# Patient Record
Sex: MALE | Race: BLACK OR AFRICAN AMERICAN | NOT HISPANIC OR LATINO | URBAN - METROPOLITAN AREA
[De-identification: names, ages, dates, MRNs, and addresses within clinical notes are randomized per-mention and may not be internally consistent; named-entity substitution may affect disease eponyms.]

---

## 2018-04-01 ENCOUNTER — OUTPATIENT (OUTPATIENT)
Dept: OUTPATIENT SERVICES | Facility: HOSPITAL | Age: 63
LOS: 1 days | End: 2018-04-01
Payer: MEDICAID

## 2018-04-01 PROCEDURE — G9001: CPT

## 2018-04-03 ENCOUNTER — EMERGENCY (EMERGENCY)
Facility: HOSPITAL | Age: 63
LOS: 1 days | End: 2018-04-03
Attending: EMERGENCY MEDICINE
Payer: MEDICAID

## 2018-04-03 ENCOUNTER — EMERGENCY (EMERGENCY)
Facility: HOSPITAL | Age: 63
LOS: 0 days | Discharge: ANOTHER HOSPITAL | End: 2018-04-03
Attending: STUDENT IN AN ORGANIZED HEALTH CARE EDUCATION/TRAINING PROGRAM
Payer: MEDICAID

## 2018-04-03 VITALS
OXYGEN SATURATION: 97 % | HEART RATE: 70 BPM | RESPIRATION RATE: 18 BRPM | DIASTOLIC BLOOD PRESSURE: 92 MMHG | SYSTOLIC BLOOD PRESSURE: 163 MMHG

## 2018-04-03 VITALS
OXYGEN SATURATION: 97 % | TEMPERATURE: 98 F | HEIGHT: 77 IN | HEART RATE: 92 BPM | RESPIRATION RATE: 17 BRPM | SYSTOLIC BLOOD PRESSURE: 153 MMHG | WEIGHT: 220.02 LBS | DIASTOLIC BLOOD PRESSURE: 96 MMHG

## 2018-04-03 VITALS
SYSTOLIC BLOOD PRESSURE: 158 MMHG | RESPIRATION RATE: 17 BRPM | DIASTOLIC BLOOD PRESSURE: 99 MMHG | HEART RATE: 84 BPM | OXYGEN SATURATION: 100 %

## 2018-04-03 VITALS
DIASTOLIC BLOOD PRESSURE: 94 MMHG | HEART RATE: 72 BPM | SYSTOLIC BLOOD PRESSURE: 154 MMHG | TEMPERATURE: 98 F | OXYGEN SATURATION: 98 % | RESPIRATION RATE: 16 BRPM

## 2018-04-03 DIAGNOSIS — S02.92XA UNSPECIFIED FRACTURE OF FACIAL BONES, INITIAL ENCOUNTER FOR CLOSED FRACTURE: ICD-10-CM
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
## 2018-04-03 DIAGNOSIS — S22.41XA MULTIPLE FRACTURES OF RIBS, RIGHT SIDE, INITIAL ENCOUNTER FOR CLOSED FRACTURE: ICD-10-CM

## 2018-04-03 DIAGNOSIS — M54.9 DORSALGIA, UNSPECIFIED: ICD-10-CM

## 2018-04-03 LAB
ALBUMIN SERPL ELPH-MCNC: 3.8 G/DL — SIGNIFICANT CHANGE UP (ref 3.3–5)
ALP SERPL-CCNC: 87 U/L — SIGNIFICANT CHANGE UP (ref 40–120)
ALT FLD-CCNC: 42 U/L — SIGNIFICANT CHANGE UP (ref 12–78)
ANION GAP SERPL CALC-SCNC: 7 MMOL/L — SIGNIFICANT CHANGE UP (ref 5–17)
APTT BLD: 31.4 SEC — SIGNIFICANT CHANGE UP (ref 27.5–37.4)
AST SERPL-CCNC: 48 U/L — HIGH (ref 15–37)
BASOPHILS # BLD AUTO: 0.02 K/UL — SIGNIFICANT CHANGE UP (ref 0–0.2)
BASOPHILS NFR BLD AUTO: 0.3 % — SIGNIFICANT CHANGE UP (ref 0–2)
BILIRUB SERPL-MCNC: 0.6 MG/DL — SIGNIFICANT CHANGE UP (ref 0.2–1.2)
BUN SERPL-MCNC: 14 MG/DL — SIGNIFICANT CHANGE UP (ref 7–23)
CALCIUM SERPL-MCNC: 8.9 MG/DL — SIGNIFICANT CHANGE UP (ref 8.5–10.1)
CHLORIDE SERPL-SCNC: 106 MMOL/L — SIGNIFICANT CHANGE UP (ref 96–108)
CO2 SERPL-SCNC: 25 MMOL/L — SIGNIFICANT CHANGE UP (ref 22–31)
CREAT SERPL-MCNC: 0.94 MG/DL — SIGNIFICANT CHANGE UP (ref 0.5–1.3)
EOSINOPHIL # BLD AUTO: 0.02 K/UL — SIGNIFICANT CHANGE UP (ref 0–0.5)
EOSINOPHIL NFR BLD AUTO: 0.3 % — SIGNIFICANT CHANGE UP (ref 0–6)
GLUCOSE SERPL-MCNC: 108 MG/DL — HIGH (ref 70–99)
HCT VFR BLD CALC: 43.7 % — SIGNIFICANT CHANGE UP (ref 39–50)
HGB BLD-MCNC: 14.7 G/DL — SIGNIFICANT CHANGE UP (ref 13–17)
IMM GRANULOCYTES NFR BLD AUTO: 0.3 % — SIGNIFICANT CHANGE UP (ref 0–1.5)
INR BLD: 1.08 RATIO — SIGNIFICANT CHANGE UP (ref 0.88–1.16)
LYMPHOCYTES # BLD AUTO: 1 K/UL — SIGNIFICANT CHANGE UP (ref 1–3.3)
LYMPHOCYTES # BLD AUTO: 16.9 % — SIGNIFICANT CHANGE UP (ref 13–44)
MCHC RBC-ENTMCNC: 29.5 PG — SIGNIFICANT CHANGE UP (ref 27–34)
MCHC RBC-ENTMCNC: 33.6 GM/DL — SIGNIFICANT CHANGE UP (ref 32–36)
MCV RBC AUTO: 87.6 FL — SIGNIFICANT CHANGE UP (ref 80–100)
MONOCYTES # BLD AUTO: 0.42 K/UL — SIGNIFICANT CHANGE UP (ref 0–0.9)
MONOCYTES NFR BLD AUTO: 7.1 % — SIGNIFICANT CHANGE UP (ref 2–14)
NEUTROPHILS # BLD AUTO: 4.45 K/UL — SIGNIFICANT CHANGE UP (ref 1.8–7.4)
NEUTROPHILS NFR BLD AUTO: 75.1 % — SIGNIFICANT CHANGE UP (ref 43–77)
NRBC # BLD: 0 /100 WBCS — SIGNIFICANT CHANGE UP (ref 0–0)
PLATELET # BLD AUTO: 244 K/UL — SIGNIFICANT CHANGE UP (ref 150–400)
POTASSIUM SERPL-MCNC: 4.2 MMOL/L — SIGNIFICANT CHANGE UP (ref 3.5–5.3)
POTASSIUM SERPL-SCNC: 4.2 MMOL/L — SIGNIFICANT CHANGE UP (ref 3.5–5.3)
PROT SERPL-MCNC: 7.7 GM/DL — SIGNIFICANT CHANGE UP (ref 6–8.3)
PROTHROM AB SERPL-ACNC: 11.8 SEC — SIGNIFICANT CHANGE UP (ref 9.8–12.7)
RBC # BLD: 4.99 M/UL — SIGNIFICANT CHANGE UP (ref 4.2–5.8)
RBC # FLD: 13.6 % — SIGNIFICANT CHANGE UP (ref 10.3–14.5)
SODIUM SERPL-SCNC: 138 MMOL/L — SIGNIFICANT CHANGE UP (ref 135–145)
WBC # BLD: 5.93 K/UL — SIGNIFICANT CHANGE UP (ref 3.8–10.5)
WBC # FLD AUTO: 5.93 K/UL — SIGNIFICANT CHANGE UP (ref 3.8–10.5)

## 2018-04-03 PROCEDURE — 99284 EMERGENCY DEPT VISIT MOD MDM: CPT

## 2018-04-03 PROCEDURE — 72125 CT NECK SPINE W/O DYE: CPT | Mod: 26

## 2018-04-03 PROCEDURE — 96374 THER/PROPH/DIAG INJ IV PUSH: CPT

## 2018-04-03 PROCEDURE — 70486 CT MAXILLOFACIAL W/O DYE: CPT | Mod: 26

## 2018-04-03 PROCEDURE — 71045 X-RAY EXAM CHEST 1 VIEW: CPT | Mod: 26

## 2018-04-03 PROCEDURE — 74177 CT ABD & PELVIS W/CONTRAST: CPT | Mod: 26

## 2018-04-03 PROCEDURE — 71250 CT THORAX DX C-: CPT | Mod: 26

## 2018-04-03 PROCEDURE — 76377 3D RENDER W/INTRP POSTPROCES: CPT | Mod: 26

## 2018-04-03 PROCEDURE — 99284 EMERGENCY DEPT VISIT MOD MDM: CPT | Mod: 25

## 2018-04-03 PROCEDURE — 96375 TX/PRO/DX INJ NEW DRUG ADDON: CPT

## 2018-04-03 PROCEDURE — 99283 EMERGENCY DEPT VISIT LOW MDM: CPT

## 2018-04-03 PROCEDURE — 99285 EMERGENCY DEPT VISIT HI MDM: CPT | Mod: 25

## 2018-04-03 PROCEDURE — 70450 CT HEAD/BRAIN W/O DYE: CPT | Mod: 26

## 2018-04-03 PROCEDURE — 71045 X-RAY EXAM CHEST 1 VIEW: CPT

## 2018-04-03 RX ORDER — OXYCODONE HYDROCHLORIDE 5 MG/1
1 TABLET ORAL
Qty: 12 | Refills: 0
Start: 2018-04-03 | End: 2018-04-06

## 2018-04-03 RX ORDER — MORPHINE SULFATE 50 MG/1
4 CAPSULE, EXTENDED RELEASE ORAL ONCE
Qty: 0 | Refills: 0 | Status: DISCONTINUED | OUTPATIENT
Start: 2018-04-03 | End: 2018-04-03

## 2018-04-03 RX ORDER — ONDANSETRON 8 MG/1
4 TABLET, FILM COATED ORAL ONCE
Qty: 0 | Refills: 0 | Status: COMPLETED | OUTPATIENT
Start: 2018-04-03 | End: 2018-04-03

## 2018-04-03 RX ADMIN — MORPHINE SULFATE 4 MILLIGRAM(S): 50 CAPSULE, EXTENDED RELEASE ORAL at 13:58

## 2018-04-03 RX ADMIN — MORPHINE SULFATE 4 MILLIGRAM(S): 50 CAPSULE, EXTENDED RELEASE ORAL at 18:38

## 2018-04-03 RX ADMIN — ONDANSETRON 4 MILLIGRAM(S): 8 TABLET, FILM COATED ORAL at 18:21

## 2018-04-03 RX ADMIN — MORPHINE SULFATE 4 MILLIGRAM(S): 50 CAPSULE, EXTENDED RELEASE ORAL at 18:16

## 2018-04-03 RX ADMIN — ONDANSETRON 4 MILLIGRAM(S): 8 TABLET, FILM COATED ORAL at 13:58
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
## 2018-04-03 NOTE — ED PROVIDER NOTE - CARE PLAN
Principal Discharge DX:	Rib fractures  Assessment and plan of treatment:	1. Rest, ice, elevate area.  Can use 1000mg Tylenol every 6 hours for pain - as needed.  Take Motrin 600mg every 8 hrs with food for pain. Use spirometry 10-15 times per hour.   2. Follow up with PMD within 48-72 hrs.    3. Any worsening pain, swelling, weakness, numbness, shortness of breath return to ED. Ortho referral list provided if needed.  Secondary Diagnosis:	Facial bone fracture Principal Discharge DX:	Rib fractures  Assessment and plan of treatment:	1. Rest, ice, elevate area.  Can use 1000mg Tylenol every 6 hours for pain - as needed.  Take Motrin 600mg every 8 hrs with food for pain. Use spirometry 10-15 times per hour.  Take oxycodone as needed, as per package directions, for breakthrough pain.  DO NOT DRINK OR OPERATE HEAVY MACHINERY UNDER THE INFLUENCE OF OXYCODONE.  This medication can cause constipation; take over-the-counter stool softeners as per package directions for symptomatic relief.   2. Follow up with PMD within 48-72 hrs.    3. Any worsening pain, swelling, weakness, numbness, shortness of breath return to ED. Ortho referral list provided if needed.  Secondary Diagnosis:	Facial bone fracture Principal Discharge DX:	Rib fractures  Assessment and plan of treatment:	1. Rest, ice packs or cold compresses to the affected area 20 minutes on and 20 minutes off.  Can use 1000mg Tylenol every 6 hours for pain - as needed.  Use spirometry 10-15 times per hour.  Take oxycodone as needed, as per package directions, for breakthrough pain.  DO NOT DRINK OR OPERATE HEAVY MACHINERY UNDER THE INFLUENCE OF OXYCODONE.  This medication can cause constipation; take over-the-counter stool softeners as per package directions for symptomatic relief.   2. Follow up with PMD within 48-72 hrs.  Follow up with Dr. Jeff Goldberg 4/6/18.   3. Any worsening pain, swelling, weakness, numbness, shortness of breath return to ED.  Secondary Diagnosis:	Facial bone fracture

## 2018-04-03 NOTE — ED PROVIDER NOTE - CARE PLAN
Principal Discharge DX:	Closed fracture of multiple ribs of right side, initial encounter  Secondary Diagnosis:	Facial fracture
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
## 2018-04-03 NOTE — ED PROVIDER NOTE - PLAN OF CARE
1. Rest, ice, elevate area.  Can use 1000mg Tylenol every 6 hours for pain - as needed.  Take Motrin 600mg every 8 hrs with food for pain. Use spirometry 10-15 times per hour.   2. Follow up with PMD within 48-72 hrs.    3. Any worsening pain, swelling, weakness, numbness, shortness of breath return to ED. Ortho referral list provided if needed. 1. Rest, ice, elevate area.  Can use 1000mg Tylenol every 6 hours for pain - as needed.  Take Motrin 600mg every 8 hrs with food for pain. Use spirometry 10-15 times per hour.  Take oxycodone as needed, as per package directions, for breakthrough pain.  DO NOT DRINK OR OPERATE HEAVY MACHINERY UNDER THE INFLUENCE OF OXYCODONE.  This medication can cause constipation; take over-the-counter stool softeners as per package directions for symptomatic relief.   2. Follow up with PMD within 48-72 hrs.    3. Any worsening pain, swelling, weakness, numbness, shortness of breath return to ED. Ortho referral list provided if needed. 1. Rest, ice packs or cold compresses to the affected area 20 minutes on and 20 minutes off.  Can use 1000mg Tylenol every 6 hours for pain - as needed.  Use spirometry 10-15 times per hour.  Take oxycodone as needed, as per package directions, for breakthrough pain.  DO NOT DRINK OR OPERATE HEAVY MACHINERY UNDER THE INFLUENCE OF OXYCODONE.  This medication can cause constipation; take over-the-counter stool softeners as per package directions for symptomatic relief.   2. Follow up with PMD within 48-72 hrs.  Follow up with Dr. Jeff Goldberg 4/6/18.   3. Any worsening pain, swelling, weakness, numbness, shortness of breath return to ED.
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
## 2018-04-03 NOTE — ED ADULT NURSE NOTE - ED STAT RN HANDOFF DETAILS
Nestor OCAMPO transfer nurse phone report given to be transfer to the ed for evaluation.. [facility]
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
## 2018-04-03 NOTE — ED PROVIDER NOTE - PROGRESS NOTE DETAILS
transfer center called, spoke to Dr Rahman (trauma attending) who accepted the patient, report given to Dr Morales in the ER
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
## 2018-04-04 DIAGNOSIS — R69 ILLNESS, UNSPECIFIED: ICD-10-CM

## 2019-05-07 ENCOUNTER — EMERGENCY (EMERGENCY)
Facility: HOSPITAL | Age: 64
LOS: 0 days | Discharge: HOME | End: 2019-05-07
Payer: MEDICAID

## 2019-05-07 VITALS
HEART RATE: 72 BPM | OXYGEN SATURATION: 98 % | SYSTOLIC BLOOD PRESSURE: 187 MMHG | RESPIRATION RATE: 18 BRPM | TEMPERATURE: 98 F | DIASTOLIC BLOOD PRESSURE: 90 MMHG | WEIGHT: 225.09 LBS | HEIGHT: 77 IN

## 2019-05-07 DIAGNOSIS — M79.644 PAIN IN RIGHT FINGER(S): ICD-10-CM

## 2019-05-07 DIAGNOSIS — M19.90 UNSPECIFIED OSTEOARTHRITIS, UNSPECIFIED SITE: ICD-10-CM

## 2019-05-07 PROCEDURE — 99283 EMERGENCY DEPT VISIT LOW MDM: CPT

## 2019-05-07 PROCEDURE — 73130 X-RAY EXAM OF HAND: CPT | Mod: 26,LT

## 2019-05-07 NOTE — ED PROVIDER NOTE - CARE PROVIDER_API CALL
Kristyn Rubio (MD)  Plastic Surgery  [address] [place] [address], [address]  [place], [place] [id number]  Phone: [phone]  Fax: [phone]  Follow Up Time:

## 2019-05-07 NOTE — ED PROVIDER NOTE - MUSCULOSKELETAL MINIMAL EXAM
Right pinky pip, mild swelling, no erythema, no warmth, +arom, good pusle and sensory/motor intact/atraumatic

## 2019-05-07 NOTE — ED PROVIDER NOTE - OBJECTIVE STATEMENT
This is 64 yo m w a pmhx of arthritis c/o of right pinky pain and swelling x several years. Denies nausea, vomiting, fever, sob, chest pain

## 2019-05-09 ENCOUNTER — EMERGENCY (EMERGENCY)
Facility: HOSPITAL | Age: 64
LOS: 1 days | Discharge: HOME | End: 2019-05-09
Attending: EMERGENCY MEDICINE | Admitting: EMERGENCY MEDICINE
Payer: MEDICAID

## 2019-05-09 VITALS
RESPIRATION RATE: 16 BRPM | TEMPERATURE: 99 F | OXYGEN SATURATION: 99 % | DIASTOLIC BLOOD PRESSURE: 106 MMHG | SYSTOLIC BLOOD PRESSURE: 175 MMHG | HEART RATE: 73 BPM

## 2019-05-09 PROCEDURE — 73130 X-RAY EXAM OF HAND: CPT | Mod: 26,LT

## 2019-05-09 PROCEDURE — 99283 EMERGENCY DEPT VISIT LOW MDM: CPT

## 2019-05-09 RX ORDER — TETANUS TOXOID, REDUCED DIPHTHERIA TOXOID AND ACELLULAR PERTUSSIS VACCINE, ADSORBED 5; 2.5; 8; 8; 2.5 [IU]/.5ML; [IU]/.5ML; UG/.5ML; UG/.5ML; UG/.5ML
0.5 SUSPENSION INTRAMUSCULAR ONCE
Refills: 0 | Status: COMPLETED | OUTPATIENT
Start: 2019-05-09 | End: 2019-05-09

## 2019-05-09 RX ADMIN — Medication 300 MILLIGRAM(S): at 17:12

## 2019-05-09 RX ADMIN — TETANUS TOXOID, REDUCED DIPHTHERIA TOXOID AND ACELLULAR PERTUSSIS VACCINE, ADSORBED 0.5 MILLILITER(S): 5; 2.5; 8; 8; 2.5 SUSPENSION INTRAMUSCULAR at 17:12
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
## 2019-05-09 NOTE — ED PROVIDER NOTE - PHYSICAL EXAMINATION
Gen: Well appearing, NAD  Head: NCAT  HEENT: PERRL, MMM, normal conjunctiva, anicteric, neck supple  Lung: CTAB, no adventitious sounds  CV: RRR, no murmurs  Abd: soft, NTND, no rebound or guarding, no CVAT  MSK: No edema, swan-neck deformity of 3rd digit of L hand  Neuro: 5/5 strength and normal sensation in all digits.  Skin: open 2cm wounds of L pinky, mildly ttp, able to range all joints with mild limitation. No flexor tendon ttp, minimal pain with passive extension of finger, no overlying erythema, minimal swelling of finger  Psych: normal mood and affect

## 2020-05-17 ENCOUNTER — EMERGENCY (EMERGENCY)
Facility: HOSPITAL | Age: 65
LOS: 0 days | Discharge: HOME | End: 2020-05-17
Attending: EMERGENCY MEDICINE
Payer: MEDICAID

## 2020-05-17 VITALS
HEART RATE: 66 BPM | DIASTOLIC BLOOD PRESSURE: 85 MMHG | OXYGEN SATURATION: 98 % | RESPIRATION RATE: 16 BRPM | TEMPERATURE: 98 F | SYSTOLIC BLOOD PRESSURE: 155 MMHG

## 2020-05-17 VITALS
DIASTOLIC BLOOD PRESSURE: 97 MMHG | SYSTOLIC BLOOD PRESSURE: 186 MMHG | HEIGHT: 77 IN | HEART RATE: 77 BPM | RESPIRATION RATE: 20 BRPM | TEMPERATURE: 98 F | WEIGHT: 220.02 LBS | OXYGEN SATURATION: 98 %

## 2020-05-17 DIAGNOSIS — I10 ESSENTIAL (PRIMARY) HYPERTENSION: ICD-10-CM

## 2020-05-17 DIAGNOSIS — R10.9 UNSPECIFIED ABDOMINAL PAIN: ICD-10-CM

## 2020-05-17 DIAGNOSIS — M54.5 LOW BACK PAIN: ICD-10-CM

## 2020-05-17 LAB
ALBUMIN SERPL ELPH-MCNC: 3.2 G/DL — LOW (ref 3.3–5)
ALP SERPL-CCNC: 76 U/L — SIGNIFICANT CHANGE UP (ref 40–120)
ALT FLD-CCNC: 32 U/L — SIGNIFICANT CHANGE UP (ref 12–78)
ANION GAP SERPL CALC-SCNC: 5 MMOL/L — SIGNIFICANT CHANGE UP (ref 5–17)
APPEARANCE UR: ABNORMAL
AST SERPL-CCNC: 23 U/L — SIGNIFICANT CHANGE UP (ref 15–37)
BACTERIA # UR AUTO: ABNORMAL
BASOPHILS # BLD AUTO: 0 K/UL — SIGNIFICANT CHANGE UP (ref 0–0.2)
BASOPHILS NFR BLD AUTO: 0 % — SIGNIFICANT CHANGE UP (ref 0–2)
BILIRUB SERPL-MCNC: 0.5 MG/DL — SIGNIFICANT CHANGE UP (ref 0.2–1.2)
BILIRUB UR-MCNC: NEGATIVE — SIGNIFICANT CHANGE UP
BUN SERPL-MCNC: 14 MG/DL — SIGNIFICANT CHANGE UP (ref 7–23)
CALCIUM SERPL-MCNC: 8.4 MG/DL — LOW (ref 8.5–10.1)
CHLORIDE SERPL-SCNC: 108 MMOL/L — SIGNIFICANT CHANGE UP (ref 96–108)
CO2 SERPL-SCNC: 27 MMOL/L — SIGNIFICANT CHANGE UP (ref 22–31)
COLOR SPEC: YELLOW — SIGNIFICANT CHANGE UP
CREAT SERPL-MCNC: 0.99 MG/DL — SIGNIFICANT CHANGE UP (ref 0.5–1.3)
DIFF PNL FLD: NEGATIVE — SIGNIFICANT CHANGE UP
EOSINOPHIL # BLD AUTO: 0.44 K/UL — SIGNIFICANT CHANGE UP (ref 0–0.5)
EOSINOPHIL NFR BLD AUTO: 13 % — HIGH (ref 0–6)
EPI CELLS # UR: SIGNIFICANT CHANGE UP
GLUCOSE SERPL-MCNC: 107 MG/DL — HIGH (ref 70–99)
GLUCOSE UR QL: NEGATIVE MG/DL — SIGNIFICANT CHANGE UP
HCT VFR BLD CALC: 44 % — SIGNIFICANT CHANGE UP (ref 39–50)
HGB BLD-MCNC: 14.7 G/DL — SIGNIFICANT CHANGE UP (ref 13–17)
KETONES UR-MCNC: NEGATIVE — SIGNIFICANT CHANGE UP
LEUKOCYTE ESTERASE UR-ACNC: ABNORMAL
LIDOCAIN IGE QN: 92 U/L — SIGNIFICANT CHANGE UP (ref 73–393)
LYMPHOCYTES # BLD AUTO: 0.84 K/UL — LOW (ref 1–3.3)
LYMPHOCYTES # BLD AUTO: 25 % — SIGNIFICANT CHANGE UP (ref 13–44)
MANUAL SMEAR VERIFICATION: SIGNIFICANT CHANGE UP
MCHC RBC-ENTMCNC: 30 PG — SIGNIFICANT CHANGE UP (ref 27–34)
MCHC RBC-ENTMCNC: 33.4 GM/DL — SIGNIFICANT CHANGE UP (ref 32–36)
MCV RBC AUTO: 89.8 FL — SIGNIFICANT CHANGE UP (ref 80–100)
MICROCYTES BLD QL: SIGNIFICANT CHANGE UP
MONOCYTES # BLD AUTO: 0.34 K/UL — SIGNIFICANT CHANGE UP (ref 0–0.9)
MONOCYTES NFR BLD AUTO: 10 % — SIGNIFICANT CHANGE UP (ref 2–14)
NEUTROPHILS # BLD AUTO: 1.55 K/UL — LOW (ref 1.8–7.4)
NEUTROPHILS NFR BLD AUTO: 46 % — SIGNIFICANT CHANGE UP (ref 43–77)
NITRITE UR-MCNC: NEGATIVE — SIGNIFICANT CHANGE UP
NRBC # BLD: 0 /100 — SIGNIFICANT CHANGE UP (ref 0–0)
NRBC # BLD: SIGNIFICANT CHANGE UP /100 WBCS (ref 0–0)
OVALOCYTES BLD QL SMEAR: SLIGHT — SIGNIFICANT CHANGE UP
PH UR: 5 — SIGNIFICANT CHANGE UP (ref 5–8)
PLAT MORPH BLD: NORMAL — SIGNIFICANT CHANGE UP
PLATELET # BLD AUTO: 197 K/UL — SIGNIFICANT CHANGE UP (ref 150–400)
POTASSIUM SERPL-MCNC: 4.2 MMOL/L — SIGNIFICANT CHANGE UP (ref 3.5–5.3)
POTASSIUM SERPL-SCNC: 4.2 MMOL/L — SIGNIFICANT CHANGE UP (ref 3.5–5.3)
PROT SERPL-MCNC: 7 GM/DL — SIGNIFICANT CHANGE UP (ref 6–8.3)
PROT UR-MCNC: NEGATIVE MG/DL — SIGNIFICANT CHANGE UP
RBC # BLD: 4.9 M/UL — SIGNIFICANT CHANGE UP (ref 4.2–5.8)
RBC # FLD: 13.2 % — SIGNIFICANT CHANGE UP (ref 10.3–14.5)
RBC BLD AUTO: NORMAL — SIGNIFICANT CHANGE UP
RBC CASTS # UR COMP ASSIST: SIGNIFICANT CHANGE UP /HPF (ref 0–4)
SODIUM SERPL-SCNC: 140 MMOL/L — SIGNIFICANT CHANGE UP (ref 135–145)
SP GR SPEC: 1.02 — SIGNIFICANT CHANGE UP (ref 1.01–1.02)
UROBILINOGEN FLD QL: NEGATIVE MG/DL — SIGNIFICANT CHANGE UP
VARIANT LYMPHS # BLD: 6 % — SIGNIFICANT CHANGE UP (ref 0–6)
WBC # BLD: 3.36 K/UL — LOW (ref 3.8–10.5)
WBC # FLD AUTO: 3.36 K/UL — LOW (ref 3.8–10.5)
WBC UR QL: ABNORMAL

## 2020-05-17 PROCEDURE — 99285 EMERGENCY DEPT VISIT HI MDM: CPT

## 2020-05-17 PROCEDURE — 74176 CT ABD & PELVIS W/O CONTRAST: CPT | Mod: 26

## 2020-05-17 RX ORDER — CYCLOBENZAPRINE HYDROCHLORIDE 10 MG/1
1 TABLET, FILM COATED ORAL
Qty: 21 | Refills: 0
Start: 2020-05-17 | End: 2020-05-23

## 2020-05-17 RX ORDER — KETOROLAC TROMETHAMINE 30 MG/ML
30 SYRINGE (ML) INJECTION ONCE
Refills: 0 | Status: DISCONTINUED | OUTPATIENT
Start: 2020-05-17 | End: 2020-05-17

## 2020-05-17 RX ORDER — SODIUM CHLORIDE 9 MG/ML
1000 INJECTION INTRAMUSCULAR; INTRAVENOUS; SUBCUTANEOUS ONCE
Refills: 0 | Status: COMPLETED | OUTPATIENT
Start: 2020-05-17 | End: 2020-05-17

## 2020-05-17 RX ORDER — CEPHALEXIN 500 MG
500 CAPSULE ORAL ONCE
Refills: 0 | Status: COMPLETED | OUTPATIENT
Start: 2020-05-17 | End: 2020-05-17

## 2020-05-17 RX ORDER — IBUPROFEN 200 MG
1 TABLET ORAL
Qty: 42 | Refills: 0
Start: 2020-05-17 | End: 2020-05-30

## 2020-05-17 RX ORDER — CEPHALEXIN 500 MG
1 CAPSULE ORAL
Qty: 21 | Refills: 0
Start: 2020-05-17 | End: 2020-05-23

## 2020-05-17 RX ADMIN — Medication 500 MILLIGRAM(S): at 10:19

## 2020-05-17 RX ADMIN — SODIUM CHLORIDE 1000 MILLILITER(S): 9 INJECTION INTRAMUSCULAR; INTRAVENOUS; SUBCUTANEOUS at 08:18

## 2020-05-17 RX ADMIN — Medication 30 MILLIGRAM(S): at 08:17

## 2020-05-17 NOTE — ED ADULT NURSE NOTE - NSIMPLEMENTINTERV_GEN_ALL_ED
Implemented All Fall Risk Interventions:  [place] to call system. Call bell, personal items and telephone within reach. Instruct patient to call for assistance. Room bathroom lighting operational. Non-slip footwear when patient is off stretcher. Physically safe environment: no spills, clutter or unnecessary equipment. Stretcher in lowest position, wheels locked, appropriate side rails in place. Provide visual cue, wrist band, yellow gown, etc. Monitor gait and stability. Monitor for mental status changes and reorient to person, place, and time. Review medications for side effects contributing to fall risk. Reinforce activity limits and safety measures with patient and family.

## 2020-05-17 NOTE — ED PROVIDER NOTE - OBJECTIVE STATEMENT
63yo male hx of htn presents complaining of R flank pain. States pain is sharp radiating into the R lower abdomen and R groin. Denies dysuria, hematuria, fevers/chills, n/v/d, chest pain, sob, penile discharge, testicular involvement and other symptoms. States he had a kidney stones 3-4 years ago and it feels the same.

## 2020-05-17 NOTE — ED PROVIDER NOTE - PHYSICAL EXAMINATION
PE:   GEN: Awake, alert, interactive, NAD, non-toxic appearing.   HEAD AND NECK: NC/AT. Airway patent. Neck supple.   EYES: Clear b/l. PERRL  CARDIAC: RRR. S1, S2. No evident pedal edema.    RESP: Normal respiratory effort with no use of accessory muscles or retractions. Clear throughout on auscultation.  ABD: + R CVA tenderness. soft, non-distended, non-tender. No rebound, no guarding.   : No penile discharge, lesions or erythema. No testicular swelling or tenderness (Chaperoned by Anna OCAMPO)  NEURO: AOx3, CN II-XII grossly intact, no focal deficits.   MSK: Moving all extremities with no apparent deformities.   SKIN: Warm, dry, intact normal color Medications

## 2020-05-17 NOTE — ED PROVIDER NOTE - CLINICAL SUMMARY MEDICAL DECISION MAKING FREE TEXT BOX
65yo male hx of htn presents complaining of R flank pain. States pain is sharp radiating into the R lower abdomen and R groin. Hx of kidney stone. Afebrile, well appearing. + R CVA tenderness. Abdomen benign.  exam with no significant findings. Concerning for kidney stone but will get belly labs to rule out other acute abdominal pathology. Will get CT abdomen pelvis.

## 2020-05-17 NOTE — ED PROVIDER NOTE - PATIENT PORTAL LINK FT
You can access the FollowMyHealth Patient Portal offered by [facility] by registering at the following website: [email or web address]. By joining [employer]’s FollowMyHealth portal, you will also be able to view your health information using other applications (apps) compatible with our system.

## 2020-05-17 NOTE — ED PROVIDER NOTE - ATTENDING CONTRIBUTION TO CARE
I have seen the patient with the PA and agree with above examination and assessment and plan with the following addendum:        Focused PE:   General: NAD, alert and oriented  Head: Normocephalic, atraumatic  Eyes: PERRLA, EOMI  Cardiac: RRR, no murmurs, rubs or gallops  Resp: CTA, no wheezes, rales or ronchi  GI: Nondistended, nontender, no rebound or guarding  MSK: Slight pain with R. straight leg raise. Slight CVA tenderness  Neuro: Alert and oriented, no focal deficits. Normal gait  Ext: Non edematous, nontender.     Ddx: MSK pain/ ro Kidney stone/ no abdominal tenderness or guarding to suggest surgical abdomen.   Plan: cbc, cmp, toradol, ct renal, reassess

## 2020-05-17 NOTE — ED ADULT NURSE NOTE - OBJECTIVE STATEMENT
pt received alert and oriented x4, pt c/o right flank pain for 2 days radiating today to the right groin, denies dysuria, nausea, fever , chills and hematuria . denies medical hx , or medication hx

## 2020-05-17 NOTE — ED ADULT NURSE NOTE - NS TRANSFER PATIENT BELONGINGS
PAS-RR    D: Per DHS regulation, SW completed and submitted PAS-RR to [place] [employer] via the [employer].  PAS-RR confirmation # is : [id number]      I: SW spoke with dtr and they are aware a PAS-RR has been submitted.  SW reviewed with dtr that they may be contacted for a follow up appointment within 10 days of hospital discharge if their SNF stay is < 30 days.  Contact information for [employer] was also provided.    A: dtr verbalized understanding.    P: Further questions may be directed to [employer] at #[phone], option #4 for PAS-RR staff.    SW faxed orders/PAS.    JENNIFER Altamirano, LGSW  [phone]  [facility]       Clothing

## 2020-05-18 LAB
CULTURE RESULTS: SIGNIFICANT CHANGE UP
SPECIMEN SOURCE: SIGNIFICANT CHANGE UP

## 2021-08-26 NOTE — ED PROVIDER NOTE - NORMAL, MLM
· Patient currently on Eliquis for Afib, Metoprolol for rate control, and Amiodarone for rhythm control and to continue all medications at this time  · Patient has pacemaker and AICD  · Continue pacemaker interrogation as scheduled  · Follow up with Dr Mo Ravi  yasmin all pertinent systems normal

## 2021-12-19 ENCOUNTER — EMERGENCY (EMERGENCY)
Facility: HOSPITAL | Age: 66
LOS: 0 days | Discharge: HOME | End: 2021-12-20
Attending: STUDENT IN AN ORGANIZED HEALTH CARE EDUCATION/TRAINING PROGRAM
Payer: MEDICAID

## 2021-12-19 ENCOUNTER — TRANSCRIPTION ENCOUNTER (OUTPATIENT)
Age: 66
End: 2021-12-19

## 2021-12-19 VITALS
OXYGEN SATURATION: 95 % | RESPIRATION RATE: 18 BRPM | SYSTOLIC BLOOD PRESSURE: 114 MMHG | TEMPERATURE: 99 F | WEIGHT: 220.02 LBS | HEART RATE: 104 BPM | HEIGHT: 77 IN | DIASTOLIC BLOOD PRESSURE: 75 MMHG

## 2021-12-19 DIAGNOSIS — I10 ESSENTIAL (PRIMARY) HYPERTENSION: ICD-10-CM

## 2021-12-19 DIAGNOSIS — M79.10 MYALGIA, UNSPECIFIED SITE: ICD-10-CM

## 2021-12-19 DIAGNOSIS — R53.1 WEAKNESS: ICD-10-CM

## 2021-12-19 DIAGNOSIS — U07.1 COVID-19: ICD-10-CM

## 2021-12-19 DIAGNOSIS — Z88.6 ALLERGY STATUS TO ANALGESIC AGENT: ICD-10-CM

## 2021-12-19 DIAGNOSIS — R51.9 HEADACHE, UNSPECIFIED: ICD-10-CM

## 2021-12-19 PROCEDURE — 71045 X-RAY EXAM CHEST 1 VIEW: CPT | Mod: 26

## 2021-12-19 PROCEDURE — 99284 EMERGENCY DEPT VISIT MOD MDM: CPT

## 2021-12-19 NOTE — ED ADULT NURSE NOTE - NSIMPLEMENTINTERV_GEN_ALL_ED
Implemented All Universal Safety Interventions:  [place] to call system. Call bell, personal items and telephone within reach. Instruct patient to call for assistance. Room bathroom lighting operational. Non-slip footwear when patient is off stretcher. Physically safe environment: no spills, clutter or unnecessary equipment. Stretcher in lowest position, wheels locked, appropriate side rails in place.

## 2021-12-20 VITALS
HEART RATE: 81 BPM | DIASTOLIC BLOOD PRESSURE: 81 MMHG | SYSTOLIC BLOOD PRESSURE: 122 MMHG | TEMPERATURE: 98 F | RESPIRATION RATE: 18 BRPM | OXYGEN SATURATION: 98 %

## 2021-12-20 LAB
FLUAV AG NPH QL: SIGNIFICANT CHANGE UP
FLUBV AG NPH QL: SIGNIFICANT CHANGE UP
SARS-COV-2 RNA SPEC QL NAA+PROBE: DETECTED

## 2021-12-20 RX ORDER — IBUPROFEN 200 MG
400 TABLET ORAL ONCE
Refills: 0 | Status: COMPLETED | OUTPATIENT
Start: 2021-12-20 | End: 2021-12-20

## 2021-12-20 RX ORDER — ACETAMINOPHEN 500 MG
650 TABLET ORAL ONCE
Refills: 0 | Status: COMPLETED | OUTPATIENT
Start: 2021-12-20 | End: 2021-12-20

## 2021-12-20 RX ADMIN — Medication 650 MILLIGRAM(S): at 02:52

## 2021-12-20 RX ADMIN — Medication 400 MILLIGRAM(S): at 02:52

## 2021-12-20 RX ADMIN — Medication 650 MILLIGRAM(S): at 02:38

## 2021-12-20 RX ADMIN — Medication 400 MILLIGRAM(S): at 02:36

## 2021-12-20 NOTE — ED PROVIDER NOTE - NSFOLLOWUPINSTRUCTIONS_ED_ALL_ED_FT
1) Please follow-up with your primary care doctor.  If you cannot follow-up with your doctor(s), please return to the ED for any urgent issues.  2) If you have any worsening of symptoms or any other concerns please return to the ED immediately.  3) Please continue taking your home medications as directed.  4) You may have been given a copy of your labs and/or imaging.  Please go over these with your primary care doctor.   5) Take 600mg Motrin (also called Advil or Ibuprofen) every 6 hours as needed for fever/pain/discomfort/swelling. You can take this with Tylenol 650 mg (also called acetaminophen) every 6 hours.   Don't use more than 3500mg of Tylenol in any 24-hour period. Make sure your other prescription/over-the-counter medications don't contain any Tylenol so you don't take too much.   If you have any stomach discomfort while taking Motrin, you can use TUMS or Pepcid or Zantac (these can all be bought without a prescription).       -Rest and stay well hydrated  -Take over the counter acetaminophen (Tylenol) 650-1000 mg every every 4-6 hours as needed for fever/pain. Do not take more than 4000 mg in a 24 hour period. Be aware many over the counter and prescription medications also contain acetaminophen (Tylenol).    For a 14 day period:  -Stay inside your home as much as possible, avoiding public places or public interaction  -Do not go to work  -If you do enter any public domain, at minimum wear a surgical mask at all times  -Even while indoors, attempt to remain isolated from other individuals such as family or friends, as much as possible  -Return to the Emergency Department for any symptoms such as worsening shortness of breath, significant worsening cough, high fevers despite over the counter fever-reducing medications, or severe weakness/malaise

## 2021-12-20 NOTE — ED PROVIDER NOTE - OBJECTIVE STATEMENT
66M pmhx htn, arthritis, presenting to [facility] ED for generalized weakness, body aches, headache, cough. States he is concerned he may have contracted covid despite being fully vaccinated against covid (pfizer shot x2, 2nd shot ~6 months ago). Pt denies chest pain or trouble breathing.

## 2021-12-20 NOTE — ED PROVIDER NOTE - PATIENT PORTAL LINK FT
You can access the FollowMyHealth Patient Portal offered by [facility] by registering at the following website: [email or web address]. By joining [email or web address]’s FollowMyHealth portal, you will also be able to view your health information using other applications (apps) compatible with our system.

## 2021-12-20 NOTE — ED PROVIDER NOTE - CLINICAL SUMMARY MEDICAL DECISION MAKING FREE TEXT BOX
covid+. no vital sign abnormalities. no sob.   Pt enrolled for MAB via [facility].  PAVITHRA.

## 2021-12-23 ENCOUNTER — APPOINTMENT (OUTPATIENT)
Dept: DISASTER EMERGENCY | Facility: HOSPITAL | Age: 66
End: 2021-12-23

## 2021-12-23 ENCOUNTER — OUTPATIENT (OUTPATIENT)
Dept: INPATIENT UNIT | Facility: HOSPITAL | Age: 66
LOS: 1 days | End: 2021-12-23
Payer: COMMERCIAL

## 2021-12-23 VITALS
SYSTOLIC BLOOD PRESSURE: 127 MMHG | TEMPERATURE: 98 F | RESPIRATION RATE: 18 BRPM | OXYGEN SATURATION: 99 % | DIASTOLIC BLOOD PRESSURE: 84 MMHG | HEART RATE: 69 BPM

## 2021-12-23 VITALS
OXYGEN SATURATION: 98 % | HEART RATE: 74 BPM | HEIGHT: 77 IN | SYSTOLIC BLOOD PRESSURE: 139 MMHG | RESPIRATION RATE: 18 BRPM | DIASTOLIC BLOOD PRESSURE: 89 MMHG | WEIGHT: 220.02 LBS | TEMPERATURE: 98 F

## 2021-12-23 DIAGNOSIS — U07.1 COVID-19: ICD-10-CM

## 2021-12-23 PROCEDURE — M0243: CPT

## 2021-12-23 RX ORDER — SODIUM CHLORIDE 9 MG/ML
250 INJECTION INTRAMUSCULAR; INTRAVENOUS; SUBCUTANEOUS
Refills: 0 | Status: COMPLETED | OUTPATIENT
Start: 2021-12-23 | End: 2021-12-23

## 2021-12-23 RX ADMIN — SODIUM CHLORIDE 310 MILLILITER(S): 9 INJECTION INTRAMUSCULAR; INTRAVENOUS; SUBCUTANEOUS at 16:47

## 2021-12-23 NOTE — CHART NOTE - NSCHARTNOTEFT_GEN_A_CORE
CC: Monoclonal Antibody Infusion - COVID 19 Positive or significant COVID exposure       History: Patient presents for infusion of monoclonal antibody infusion. Patient has been screened and was deemed to be a candidate.    Date tested positive: 12/19      COVID Symptoms:  Date of onset:   *Chills  * General Malaise        Risk Profile includes:   HTN      Vaccination Status: vaccinated  Date received 2nd dose: 03/21      No Known Allergies            PMHx:  Infection due to severe acute respiratory syndrome coronavirus 2 (SARS-CoV-2)    MEWS Score          T(C): 36.9 (12-23-21 @ 17:11), Max: 36.9 (12-23-21 @ 17:00)  HR: 69 (12-23-21 @ 17:11) (69 - 74)  BP: 121/80 (12-23-21 @ 17:11) (107/78 - 139/89)  RR: 18 (12-23-21 @ 17:11) (18 - 18)  SpO2: 99% (12-23-21 @ 17:11) (97% - 99%)      PE- Well developed, well-nourish, resting comfortably in NAD.   Pulm- Normal rate.  No distress.  Abd soft and non-tender.   Neuro- A&Ox3, no gross sensory deficits to light touch or motor weaknesses.   Vasc- No peripheral edema or venous stasis noted.  Skin- No ecchymosis or bleeding.  MS- No bony tenderness       ASSESSMENT:  Pt is a 66y year old Male COVID positive and symptomatic who was referred to the infusion center for Monoclonal antibody infusion (Regeneron).      PLAN:  - infusion procedure explained to patient   - Consent for monoclonal antibody infusion obtained   - Risk & benefits discussed/all questions answered  - infusion of Regeneron   - will observe patient for one hour post infusion  and then if stable discharge home with outpatient follow up as planned by PMD.    POST INFUSION ASSESSMENT:   DISCHARGE at approximately 1845 hours    - Patient tolerated infusion well denies complaints of chest pain, SOB, dizziness or palpitations  - VSS for discharge home  - D/C instructions given/ fact sheet included.  - Patient to follow-up with PCP as needed.

## 2022-07-02 ENCOUNTER — EMERGENCY (EMERGENCY)
Facility: HOSPITAL | Age: 67
LOS: 1 days | Discharge: HOME | End: 2022-07-02
Attending: EMERGENCY MEDICINE | Admitting: EMERGENCY MEDICINE

## 2022-07-02 VITALS
TEMPERATURE: 98 F | DIASTOLIC BLOOD PRESSURE: 77 MMHG | OXYGEN SATURATION: 98 % | SYSTOLIC BLOOD PRESSURE: 113 MMHG | HEART RATE: 68 BPM | HEIGHT: 77 IN | RESPIRATION RATE: 16 BRPM

## 2022-07-02 VITALS
HEART RATE: 88 BPM | OXYGEN SATURATION: 100 % | DIASTOLIC BLOOD PRESSURE: 82 MMHG | RESPIRATION RATE: 16 BRPM | SYSTOLIC BLOOD PRESSURE: 125 MMHG | TEMPERATURE: 97 F

## 2022-07-02 PROCEDURE — 99284 EMERGENCY DEPT VISIT MOD MDM: CPT

## 2022-07-02 RX ORDER — ONDANSETRON 8 MG/1
4 TABLET, FILM COATED ORAL ONCE
Refills: 0 | Status: COMPLETED | OUTPATIENT
Start: 2022-07-02 | End: 2022-07-02

## 2022-07-02 RX ADMIN — ONDANSETRON 4 MILLIGRAM(S): 8 TABLET, FILM COATED ORAL at 09:02

## 2022-07-02 NOTE — ED PROVIDER NOTE - PATIENT PORTAL LINK FT
You can access the FollowMyHealth Patient Portal offered by [facility] by registering at the following website: [email or web address]. By joining [employer]’s FollowMyHealth portal, you will also be able to view your health information using other applications (apps) compatible with our system.
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
## 2022-07-02 NOTE — ED ADULT NURSE NOTE - OBJECTIVE STATEMENT
patient alert ox4 came in with PD with hand cuffs states I was nauseous and slightly dizzy at the precinct. states he drinks half a pint of [place] daily. denies any alcohol with [place]l in the past. denies head ache/ chest pain/ abdominal pain. breathing even and unlabored. skin warm and  dry. will continue to monitor..

## 2022-07-02 NOTE — ED PROVIDER NOTE - PHYSICAL EXAMINATION
General: WN/WD NAD  Head: Atraumatic, normocephalic  Eyes: EOM grossly in tact, no scleral icterus, no discharge  ENT: moist mucous membranes  Neurology: A&Ox 3, nonfocal, WORTHY x 4  No tremors in hand  Respiratory: CTAB, no wheezing, normal respiratory effort  CV: RRR, good s1/s2, no S3, Extremities warm and well perfused  Abdominal: Soft, non-distended, non-tender, no masses  Extremities: No edema, no deformities  Skin: warm and dry. No rashes, small abrasion on L lower back  Spine: no midline spinal tenderness, no crepitus or step offs.  Psych: pt alert, appropriate, follows commands, cooperative General: WN/WD NAD  Head: Atraumatic, normocephalic  Eyes: EOM grossly in tact, no scleral icterus, no discharge  ENT: moist mucous membranes  Neurology: A&Ox 3, nonfocal, WORTHY x 4  No tremors in hand  Respiratory: CTAB, no wheezing, normal respiratory effort  CV: RRR, good s1/s2, no S3, Extremities warm and well perfused  Abdominal: Soft, non-distended, non-tender, no masses  Extremities: No edema, no deformities  Skin: warm and dry. No rashes, small abrasion on L lower back  Spine: no midline spinal tenderness, no crepitus or step offs.  Psych: pt alert, appropriate, follows commands, cooperative  ATTENDING PHYSICAL EXAM  GEN - NAD; well appearing; A+O x3  HEAD - NC/AT; EYES/NOSE - PERRL, EOMI, mucous membranes moist, no discharge; THROAT: Oral cavity and pharynx normal. No inflammation, swelling, exudate, or lesions  NECK: Neck supple, non-tender without lymphadenopathy, no masses, no JVD  PULMONARY - CTA b/l, symmetric breath sounds, no w/r/r  CARDIAC -s1s2, RRR, no M,R,G  ABDOMEN - +NABS, ND, NT, soft, no guarding, no rebound, no masses   BACK - no CVA tenderness, No vertebral or paravertebral tenderness  EXTREMITIES - symmetric pulses, 2+ dp, capillary refill < 2 seconds, no clubbing, no cyanosis, no edema  SKIN - no rash or bruising   NEUROLOGIC - alert, CN 2-12 intact, sensation nl, coordination nl, romberg negative, motor 5/5 RUE/LUE/RLE/LLE/EHL/Plantar flexion  PSYCH - insight and judgment nl, memory nl, affect nl, thought nl

## 2022-07-02 NOTE — ED PROVIDER NOTE - NSFOLLOWUPINSTRUCTIONS_ED_ALL_ED_FT
Please follow up with your primary care physician within 2-3 days.  Return to the ER for any new or concerning symptoms.  You may take 650 mg acetaminophen every eight hours as needed for pain. Please follow up with your primary care physician within 2-3 days.  Return to the ER for any new or concerning symptoms.  You may take 650 mg acetaminophen every eight hours as needed for pain.  See attached information on Nausea.

## 2022-07-02 NOTE — ED ADULT TRIAGE NOTE - PRO INTERPRETER NEED 2
English
Additional Notes: Patient had gone on vacation and noticed breakout with redness after sunburn. Became worse after using hydrocortisone. Has history of acne, however mild.
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
## 2022-07-02 NOTE — ED ADULT TRIAGE NOTE - CHIEF COMPLAINT QUOTE
Pt sent from [facility] PCT for ETOH.  Under arrest for DWI.  P.O. states pt was lying on the floor drunk and they were unable to process him.  Was seen at [facility] and released last night.

## 2022-07-02 NOTE — ED PROVIDER NOTE - PROGRESS NOTE DETAILS
Jessica Paez, PGY-2, EM: The patient tolerated a PO challenge. Pt was able to eat both some food and liquids.
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
## 2022-09-25 ENCOUNTER — INPATIENT (INPATIENT)
Facility: HOSPITAL | Age: 67
LOS: 0 days | Discharge: HOME | End: 2022-09-26
Attending: HOSPITALIST | Admitting: FAMILY MEDICINE

## 2022-09-25 VITALS
SYSTOLIC BLOOD PRESSURE: 138 MMHG | RESPIRATION RATE: 20 BRPM | DIASTOLIC BLOOD PRESSURE: 96 MMHG | TEMPERATURE: 98 F | HEIGHT: 77 IN | OXYGEN SATURATION: 98 % | HEART RATE: 89 BPM | WEIGHT: 220.02 LBS

## 2022-09-25 DIAGNOSIS — Z96.641 PRESENCE OF RIGHT ARTIFICIAL HIP JOINT: Chronic | ICD-10-CM

## 2022-09-25 PROBLEM — I10 ESSENTIAL (PRIMARY) HYPERTENSION: Chronic | Status: ACTIVE | Noted: 2022-07-02

## 2022-09-25 LAB
ALBUMIN SERPL ELPH-MCNC: 3.8 G/DL — SIGNIFICANT CHANGE UP (ref 3.3–5)
ALP SERPL-CCNC: 89 U/L — SIGNIFICANT CHANGE UP (ref 40–120)
ALT FLD-CCNC: 33 U/L — SIGNIFICANT CHANGE UP (ref 12–78)
ANION GAP SERPL CALC-SCNC: 7 MMOL/L — SIGNIFICANT CHANGE UP (ref 5–17)
AST SERPL-CCNC: 32 U/L — SIGNIFICANT CHANGE UP (ref 15–37)
BASOPHILS # BLD AUTO: 0.04 K/UL — SIGNIFICANT CHANGE UP (ref 0–0.2)
BASOPHILS NFR BLD AUTO: 0.6 % — SIGNIFICANT CHANGE UP (ref 0–2)
BILIRUB SERPL-MCNC: 1 MG/DL — SIGNIFICANT CHANGE UP (ref 0.2–1.2)
BUN SERPL-MCNC: 15 MG/DL — SIGNIFICANT CHANGE UP (ref 7–23)
CALCIUM SERPL-MCNC: 9.5 MG/DL — SIGNIFICANT CHANGE UP (ref 8.5–10.1)
CHLORIDE SERPL-SCNC: 103 MMOL/L — SIGNIFICANT CHANGE UP (ref 96–108)
CO2 SERPL-SCNC: 28 MMOL/L — SIGNIFICANT CHANGE UP (ref 22–31)
CREAT SERPL-MCNC: 0.99 MG/DL — SIGNIFICANT CHANGE UP (ref 0.5–1.3)
EGFR: 85 ML/MIN/1.73M2 — SIGNIFICANT CHANGE UP
EOSINOPHIL # BLD AUTO: 0.16 K/UL — SIGNIFICANT CHANGE UP (ref 0–0.5)
EOSINOPHIL NFR BLD AUTO: 2.2 % — SIGNIFICANT CHANGE UP (ref 0–6)
GLUCOSE SERPL-MCNC: 106 MG/DL — HIGH (ref 70–99)
HCT VFR BLD CALC: 43.6 % — SIGNIFICANT CHANGE UP (ref 39–50)
HGB BLD-MCNC: 15 G/DL — SIGNIFICANT CHANGE UP (ref 13–17)
IMM GRANULOCYTES NFR BLD AUTO: 0.4 % — SIGNIFICANT CHANGE UP (ref 0–0.9)
LYMPHOCYTES # BLD AUTO: 1.55 K/UL — SIGNIFICANT CHANGE UP (ref 1–3.3)
LYMPHOCYTES # BLD AUTO: 21.8 % — SIGNIFICANT CHANGE UP (ref 13–44)
MCHC RBC-ENTMCNC: 30.4 PG — SIGNIFICANT CHANGE UP (ref 27–34)
MCHC RBC-ENTMCNC: 34.4 G/DL — SIGNIFICANT CHANGE UP (ref 32–36)
MCV RBC AUTO: 88.3 FL — SIGNIFICANT CHANGE UP (ref 80–100)
MONOCYTES # BLD AUTO: 0.98 K/UL — HIGH (ref 0–0.9)
MONOCYTES NFR BLD AUTO: 13.8 % — SIGNIFICANT CHANGE UP (ref 2–14)
NEUTROPHILS # BLD AUTO: 4.36 K/UL — SIGNIFICANT CHANGE UP (ref 1.8–7.4)
NEUTROPHILS NFR BLD AUTO: 61.2 % — SIGNIFICANT CHANGE UP (ref 43–77)
NRBC # BLD: 0 /100 WBCS — SIGNIFICANT CHANGE UP (ref 0–0)
PLATELET # BLD AUTO: 240 K/UL — SIGNIFICANT CHANGE UP (ref 150–400)
POTASSIUM SERPL-MCNC: 4.3 MMOL/L — SIGNIFICANT CHANGE UP (ref 3.5–5.3)
POTASSIUM SERPL-SCNC: 4.3 MMOL/L — SIGNIFICANT CHANGE UP (ref 3.5–5.3)
PROT SERPL-MCNC: 8.4 GM/DL — HIGH (ref 6–8.3)
RBC # BLD: 4.94 M/UL — SIGNIFICANT CHANGE UP (ref 4.2–5.8)
RBC # FLD: 13.2 % — SIGNIFICANT CHANGE UP (ref 10.3–14.5)
SODIUM SERPL-SCNC: 138 MMOL/L — SIGNIFICANT CHANGE UP (ref 135–145)
WBC # BLD: 7.12 K/UL — SIGNIFICANT CHANGE UP (ref 3.8–10.5)
WBC # FLD AUTO: 7.12 K/UL — SIGNIFICANT CHANGE UP (ref 3.8–10.5)

## 2022-09-25 PROCEDURE — 99285 EMERGENCY DEPT VISIT HI MDM: CPT

## 2022-09-25 PROCEDURE — 72192 CT PELVIS W/O DYE: CPT | Mod: 26,MA

## 2022-09-25 PROCEDURE — 72131 CT LUMBAR SPINE W/O DYE: CPT | Mod: 26,MA

## 2022-09-25 PROCEDURE — 71045 X-RAY EXAM CHEST 1 VIEW: CPT | Mod: 26

## 2022-09-25 RX ORDER — KETOROLAC TROMETHAMINE 30 MG/ML
30 SYRINGE (ML) INJECTION ONCE
Refills: 0 | Status: DISCONTINUED | OUTPATIENT
Start: 2022-09-25 | End: 2022-09-25

## 2022-09-25 RX ORDER — ACETAMINOPHEN 500 MG
975 TABLET ORAL ONCE
Refills: 0 | Status: COMPLETED | OUTPATIENT
Start: 2022-09-25 | End: 2022-09-25

## 2022-09-25 RX ADMIN — Medication 30 MILLIGRAM(S): at 16:00

## 2022-09-25 RX ADMIN — Medication 975 MILLIGRAM(S): at 19:50

## 2022-09-25 RX ADMIN — Medication 30 MILLIGRAM(S): at 15:30

## 2022-09-25 NOTE — ED ADULT NURSE NOTE - NSIMPLEMENTINTERV_GEN_ALL_ED
Implemented All Fall Risk Interventions:  [place] to call system. Call bell, personal items and telephone within reach. Instruct patient to call for assistance. Room bathroom lighting operational. Non-slip footwear when patient is off stretcher. Physically safe environment: no spills, clutter or unnecessary equipment. Stretcher in lowest position, wheels locked, appropriate side rails in place. Provide visual cue, wrist band, yellow gown, etc. Monitor gait and stability. Monitor for mental status changes and reorient to person, place, and time. Review medications for side effects contributing to fall risk. Reinforce activity limits and safety measures with patient and family.

## 2022-09-25 NOTE — ED ADULT NURSE NOTE - OBJECTIVE STATEMENT
pt presents to the ED c/o fall down stairs today. pt states he fell back wards while going up stairs, c/o left ribs pain and right lower back pain with difficulty walking. Denies hitting head, unsure if he passed out or not. pt presents to the ED c/o fall down stairs today. pt states he fell back wards while going up stairs, c/o left ribs pain and right lower back pain radiating down to right hip pain, with difficulty walking. Denies: hitting head, loc pt presents to the ED c/o fall down stairs today. pt states he fell back wards while going up stairs, c/o left rib sided pain and right lower back pain radiating down to right hip pain, with difficulty walking. pt admits to drinking alcohol earlier. Denies: hitting head, loc, sob

## 2022-09-25 NOTE — ED ADULT TRIAGE NOTE - CHIEF COMPLAINT QUOTE
fell back wards while going up stairs  c/o left ribs and right lower back with difficulty walking. Denies hitting head, unsure if he passed out or not. Patient has a moist cough and added that he has not been feeling well.

## 2022-09-25 NOTE — ED PROVIDER NOTE - CARE PLAN
1 Principal Discharge DX:	Unable to ambulate   Principal Discharge DX:	Fracture of iliac crest  Secondary Diagnosis:	Fall

## 2022-09-25 NOTE — ED PROVIDER NOTE - CLINICAL SUMMARY MEDICAL DECISION MAKING FREE TEXT BOX
here with fall and new pain.  imaging reveals acute fx, non-operative.  needs admission for pain control

## 2022-09-25 NOTE — ED PROVIDER NOTE - OBJECTIVE STATEMENT
fall yesterday onto bottom    pain with any movement today     pt also had right hip replacement on right side by Dr. Morgan at [facility]

## 2022-09-25 NOTE — ED PROVIDER NOTE - PHYSICAL EXAMINATION
[place]:  General: No distress.  Mentation at baseline.   HEENT: WNL  Chest/Lungs: CTAB, No wheeze, No retractions, No increased work of breathing, Normal rate  Heart: S1S2 RRR, No M/R/G, Pules equal Bilaterally in upper and lower extremities distally  Abd: soft, NT/ND, No guarding, No rebound.  No hernias, no palpable masses.  Extrem: right hip decreased ROM, low back pain   Skin: No rash noted, warm dry.  Neuro:  Grossly normal.  No difficulty ambulating. No focal deficits.  Psychiatric: No evidence of delusions. No SI/HI.

## 2022-09-26 ENCOUNTER — TRANSCRIPTION ENCOUNTER (OUTPATIENT)
Age: 67
End: 2022-09-26

## 2022-09-26 VITALS
TEMPERATURE: 98 F | SYSTOLIC BLOOD PRESSURE: 159 MMHG | RESPIRATION RATE: 19 BRPM | OXYGEN SATURATION: 96 % | HEART RATE: 63 BPM | DIASTOLIC BLOOD PRESSURE: 80 MMHG

## 2022-09-26 DIAGNOSIS — C61 MALIGNANT NEOPLASM OF PROSTATE: ICD-10-CM

## 2022-09-26 DIAGNOSIS — S32.309A UNSPECIFIED FRACTURE OF UNSPECIFIED ILIUM, INITIAL ENCOUNTER FOR CLOSED FRACTURE: ICD-10-CM

## 2022-09-26 DIAGNOSIS — I10 ESSENTIAL (PRIMARY) HYPERTENSION: ICD-10-CM

## 2022-09-26 DIAGNOSIS — F10.10 ALCOHOL ABUSE, UNCOMPLICATED: ICD-10-CM
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
## 2022-09-26 LAB
ALBUMIN SERPL ELPH-MCNC: 3.3 G/DL — SIGNIFICANT CHANGE UP (ref 3.3–5)
ALP SERPL-CCNC: 76 U/L — SIGNIFICANT CHANGE UP (ref 40–120)
ALT FLD-CCNC: 27 U/L — SIGNIFICANT CHANGE UP (ref 12–78)
ANION GAP SERPL CALC-SCNC: 7 MMOL/L — SIGNIFICANT CHANGE UP (ref 5–17)
AST SERPL-CCNC: 22 U/L — SIGNIFICANT CHANGE UP (ref 15–37)
BILIRUB DIRECT SERPL-MCNC: 0.2 MG/DL — SIGNIFICANT CHANGE UP (ref 0–0.3)
BILIRUB INDIRECT FLD-MCNC: 0.8 MG/DL — SIGNIFICANT CHANGE UP (ref 0.2–1)
BILIRUB SERPL-MCNC: 1 MG/DL — SIGNIFICANT CHANGE UP (ref 0.2–1.2)
BUN SERPL-MCNC: 21 MG/DL — SIGNIFICANT CHANGE UP (ref 7–23)
CALCIUM SERPL-MCNC: 9.4 MG/DL — SIGNIFICANT CHANGE UP (ref 8.5–10.1)
CHLORIDE SERPL-SCNC: 105 MMOL/L — SIGNIFICANT CHANGE UP (ref 96–108)
CO2 SERPL-SCNC: 30 MMOL/L — SIGNIFICANT CHANGE UP (ref 22–31)
CREAT SERPL-MCNC: 0.91 MG/DL — SIGNIFICANT CHANGE UP (ref 0.5–1.3)
EGFR: 94 ML/MIN/1.73M2 — SIGNIFICANT CHANGE UP
FLUAV AG NPH QL: SIGNIFICANT CHANGE UP
FLUBV AG NPH QL: SIGNIFICANT CHANGE UP
GLUCOSE SERPL-MCNC: 100 MG/DL — HIGH (ref 70–99)
MAGNESIUM SERPL-MCNC: 2.2 MG/DL — SIGNIFICANT CHANGE UP (ref 1.6–2.6)
PHOSPHATE SERPL-MCNC: 3.4 MG/DL — SIGNIFICANT CHANGE UP (ref 2.5–4.5)
POTASSIUM SERPL-MCNC: 4.3 MMOL/L — SIGNIFICANT CHANGE UP (ref 3.5–5.3)
POTASSIUM SERPL-SCNC: 4.3 MMOL/L — SIGNIFICANT CHANGE UP (ref 3.5–5.3)
PROT SERPL-MCNC: 7.1 GM/DL — SIGNIFICANT CHANGE UP (ref 6–8.3)
SARS-COV-2 RNA SPEC QL NAA+PROBE: SIGNIFICANT CHANGE UP
SODIUM SERPL-SCNC: 142 MMOL/L — SIGNIFICANT CHANGE UP (ref 135–145)

## 2022-09-26 PROCEDURE — 99235 HOSP IP/OBS SAME DATE MOD 70: CPT

## 2022-09-26 PROCEDURE — 99222 1ST HOSP IP/OBS MODERATE 55: CPT

## 2022-09-26 PROCEDURE — 93010 ELECTROCARDIOGRAM REPORT: CPT

## 2022-09-26 PROCEDURE — 73552 X-RAY EXAM OF FEMUR 2/>: CPT | Mod: 26,RT

## 2022-09-26 PROCEDURE — 73502 X-RAY EXAM HIP UNI 2-3 VIEWS: CPT | Mod: 26,RT

## 2022-09-26 RX ORDER — KETOROLAC TROMETHAMINE 30 MG/ML
15 SYRINGE (ML) INJECTION EVERY 6 HOURS
Refills: 0 | Status: DISCONTINUED | OUTPATIENT
Start: 2022-09-26 | End: 2022-09-26

## 2022-09-26 RX ORDER — SODIUM CHLORIDE 9 MG/ML
1000 INJECTION, SOLUTION INTRAVENOUS
Refills: 0 | Status: DISCONTINUED | OUTPATIENT
Start: 2022-09-26 | End: 2022-09-26

## 2022-09-26 RX ORDER — ACETAMINOPHEN 500 MG
1000 TABLET ORAL ONCE
Refills: 0 | Status: COMPLETED | OUTPATIENT
Start: 2022-09-26 | End: 2022-09-26

## 2022-09-26 RX ORDER — IBUPROFEN 200 MG
1 TABLET ORAL
Qty: 30 | Refills: 0
Start: 2022-09-26 | End: 2022-10-05

## 2022-09-26 RX ORDER — LANOLIN ALCOHOL/MO/W.PET/CERES
3 CREAM (GRAM) TOPICAL AT BEDTIME
Refills: 0 | Status: DISCONTINUED | OUTPATIENT
Start: 2022-09-26 | End: 2022-09-26

## 2022-09-26 RX ORDER — ACETAMINOPHEN 500 MG
650 TABLET ORAL EVERY 6 HOURS
Refills: 0 | Status: DISCONTINUED | OUTPATIENT
Start: 2022-09-26 | End: 2022-09-26

## 2022-09-26 RX ORDER — MORPHINE SULFATE 50 MG/1
2 CAPSULE, EXTENDED RELEASE ORAL EVERY 4 HOURS
Refills: 0 | Status: DISCONTINUED | OUTPATIENT
Start: 2022-09-26 | End: 2022-09-26

## 2022-09-26 RX ADMIN — Medication 15 MILLIGRAM(S): at 09:08

## 2022-09-26 RX ADMIN — Medication 15 MILLIGRAM(S): at 08:38

## 2022-09-26 RX ADMIN — Medication 400 MILLIGRAM(S): at 03:48

## 2022-09-26 RX ADMIN — Medication 975 MILLIGRAM(S): at 02:39

## 2022-09-26 RX ADMIN — Medication 1000 MILLIGRAM(S): at 04:57

## 2022-09-26 RX ADMIN — SODIUM CHLORIDE 75 MILLILITER(S): 9 INJECTION, SOLUTION INTRAVENOUS at 10:00

## 2022-09-26 NOTE — H&P ADULT - PROBLEM SELECTOR PLAN 3
No hx of withdrawal however daily drinking of liquor currently  Last drink 9/24 evening  CIWA protocol  May benefit from rehab

## 2022-09-26 NOTE — H&P ADULT - ASSESSMENT
67 M with PMH of HTN, prostate cancer who presents with inability to ambulate, nondisplaced L iliac fracture after a fall.

## 2022-09-26 NOTE — H&P ADULT - NSVTERISKASSESS_GEN_ALL_CORE FT
Medical Assessment Completed on: 26-Sep-2022 07:16 contact guard patient has advanced dementia; cannot communicate

## 2022-09-26 NOTE — H&P ADULT - HISTORY OF PRESENT ILLNESS
67 M with PMH of HTN, prostate cancer who presents with inability to ambulate, nondisplaced L iliac fracture after a fall.     Fell 9/24 PM, was going up the steps to house and fell onto back. No CP, SOB, dizziness, vision changes, before, during or after event. Was able to get back up without any issues or pain. This occurred after drinking 4 glasses of [place] which is a daily occurrence for patient. He is interested in quitting, no history of ETOH withdrawal.     The next day 9/26 had difficulty walking due to pain around lower back and R hip so came in. No fevers, chills. Did not take anything at home for the pain.

## 2022-09-26 NOTE — H&P ADULT - NSHPPHYSICALEXAM_GEN_ALL_CORE
T(C): 36.7 (09-26-22 @ 06:57), Max: 37.6 (09-25-22 @ 13:10)  HR: 69 (09-26-22 @ 06:57) (67 - 89)  BP: 150/85 (09-26-22 @ 06:57) (135/91 - 179/96)  RR: 17 (09-26-22 @ 06:57) (17 - 20)  SpO2: 98% (09-26-22 @ 06:57) (97% - 99%)    CONSTITUTIONAL: NAD  EYES:  No conjunctival or scleral injection  HENT:Atraumatic, no external nasal lesions  NECK: No cervical lymphadenopathy appreciated  RESPIRATORY: No respiratory distress, CTA b/l, no wheezes, rales or rhonchi  CARDIOVASCULAR: RRR, +S1S2, no murmurs, no peripheral edema  GASTROINTESTINAL: Soft, non tender, non distended, no rebound, no guarding; No palpable masses palpated  SKIN: No rashes or ulcers noted; no subcutaneous nodules   NEUROLOGIC: Strength grossly WNL in all extremities, pain with active flexion of R hip  PSYCHIATRIC: Appropriate insight/judgment; A+O x 3, mood and affect appropriate  EXTREMITIES:  No edema

## 2022-09-26 NOTE — DISCHARGE NOTE PROVIDER - CARE PROVIDER_API CALL
Ld Schaefer (DO)  Orthopaedic Surgery  [address] [place] [address]  Phone: [phone]  Fax: [phone]  Follow Up Time:

## 2022-09-26 NOTE — H&P ADULT - NSHPLABSRESULTS_GEN_ALL_CORE
LABS:                          15.0   7.12  )-----------( 240      ( 25 Sep 2022 15:30 )             43.6     09-25    138  |  103  |  15  ----------------------------<  106<H>  4.3   |  28  |  0.99    Ca    9.5      25 Sep 2022 15:30    TPro  8.4<H>  /  Alb  3.8  /  TBili  1.0  /  DBili  x   /  AST  32  /  ALT  33  /  AlkPhos  89  09-25        Albumin, Serum: 3.8 g/dL (09-25-22 @ 15:30)
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
## 2022-09-26 NOTE — H&P ADULT - PROBLEM SELECTOR PLAN 1
Non displaced  Ortho consulted  Pending recs PT consult  Toradol and acetaminophen working for pain  Morphine as needed-states allergy however reaction was nausea and anti-emetic pretreatment works

## 2022-09-26 NOTE — CONSULT NOTE ADULT - SUBJECTIVE AND OBJECTIVE BOX
Pt Name: LAURA SALDAÑA    MRN: [id number]    HPI: Patient is a 67y Male presenting after a  Saturday night. Pt fell backwards going up the stairs, denies HS, LOC. Pt reports R back pain since  and came to the ER Sunday morning. Pt is an independent ambulator and denies taking anticoagulation.      PAST MEDICAL & SURGICAL HISTORY:  Hypertension      Prostate cancer      Alcohol abuse, daily use      S/P hip replacement, right          Allergies: morphine (Nausea)      Ambulation: Baseline Ambulation [ ] independent [ ] With Cane [ ] With Walker [ ]  Bedbound [ ] Pivot transfers to Wheelchair only                          15.0   7.12  )-----------( 240      ( 25 Sep 2022 15:30 )             43.6     09-25    138  |  103  |  15  ----------------------------<  106<H>  4.3   |  28  |  0.99    Ca    9.5      25 Sep 2022 15:30    TPro  8.4<H>  /  Alb  3.8  /  TBili  1.0  /  DBili  x   /  AST  32  /  ALT  33  /  AlkPhos  89  09-25          PHYSICAL EXAM:    Vital Signs Last 24 Hrs  T(C): 36.8 (26 Sep 2022 07:28), Max: 37.6 (25 Sep 2022 13:10)  T(F): 98.2 (26 Sep 2022 07:28), Max: 99.6 (25 Sep 2022 13:10)  HR: 68 (26 Sep 2022 07:28) (67 - 89)  BP: 164/92 (26 Sep 2022 07:28) (135/91 - 179/96)  BP(mean): --  RR: 18 (26 Sep 2022 07:28) (17 - 20)  SpO2: 96% (26 Sep 2022 07:28) (96% - 99%)    Parameters below as of 26 Sep 2022 07:28  Patient On (Oxygen Delivery Method): room air        Physical Exam:  General: NAD, Alert, Awake and oriented  Respiratory: Symmetric chest wall expansion bilaterally, no accessory muscle use    RIGHT UE: No open skin. No obvious deformities or other signs of trauma at shoulder, upper arm, elbow, forearm, wrist or hand.  Full baseline painless ROM at shoulder, elbow, wrist, and . No bony TTP. SILT in axillary, radial, median, and ulnar distributions. 2+ radial pulse with brisk cap refill at distal finger tips. Compartments soft and compressible. Strength 5/5.      LEFT UE: No open skin. Contracture noted in L fifth finger, fixed in flexion. No other obvious deformities or other signs of trauma at shoulder, upper arm, elbow, forearm, wrist or hand.  Full baseline painless ROM at shoulder, elbow, wrist, and . No bony TTP. SILT in axillary, radial, median, and ulnar distributions. 2+ radial pulse with brisk cap refill at distal finger tips. Compartments soft and compressible. Strength 5/5.    HIPS and PELVIS: Pelvis stable to AP and Lat compression. Pt reports mild pain with R AP pelvis compression. Able to actively SLR L, unable to SLR right leg 2/2 lower back pain. Negative Log Roll, Negative Heel strike bilaterally. No pain on internal/external rotation of the hips.    RIGHT LE: No open skin. No deformities or other signs of trauma at hip, thigh, knee, leg, ankle or foot. Full baseline painless ROM at hip, knee, ankle and toes with negative log-roll and heel strike. Unable to actively SLR 2/2 R LBP. SILT toes 1-5. No bony TTP to hip, knee or ankle. 2+ DP/PT pulses with brisk cap refill distally. Compartments soft and compressible. No pain on passive stretch. Strength 5/5.      LEFT LE: No open skin. No deformities  or other signs of trauma at hip, thigh, knee, leg, ankle or foot.  Full baseline painless ROM at hip, knee, ankle and toe with negative log-roll and heel strike. Able to actively SLR. SILT toes 1-5. No bony TTP to hip, knee or ankle. 2+ DP/PT pulses with brisk cap refill distally. Compartments soft and compressible. No pain on passive stretch. Strength 5/5.        Imaging: CT scan reviewed demonstrating:, Radiographs of R hip demonstrating:     Orthopedic A/P:    Pt is a  67y Male with a nondisplaced L ilium fx      *NOTE INCOMPLETE AT THIS TIME    -Pain control PRN  -WBAT RLE  -ICE and elevate  -Follow up with Dr. Schaefer within 1 week. Please call the office to make an appointment.   -Discussed with Dr. Schaefer who is aware and approves of plan.    Pt Name: LAURA SALDAÑA    MRN: [id number]    HPI: Patient is a 67y Male presenting after a  Saturday night after drinking Jeniffer. Pt fell backwards going up the stairs, denies HS, LOC. Pt reports he landed on his right side, with right sided "hip" and "back" pain since  and came to the ER Sunday morning. Patient states his main complaints is "Hip pain" and muscle spasm. Pt is an independent ambulator and denies taking anticoagulation. Denies any numbness/tingling, radicular type pain, loss of bowel/bladder control, neurological deficits or weakness.       PAST MEDICAL & SURGICAL HISTORY:  Hypertension      Prostate cancer      Alcohol abuse, daily use      S/P hip replacement, right          Allergies: morphine (Nausea)      Ambulation: Baseline Ambulation [ ] independent [ ] With Cane [ ] With Walker [ ]  Bedbound [ ] Pivot transfers to Wheelchair only                          15.0   7.12  )-----------( 240      ( 25 Sep 2022 15:30 )             43.6     09-25    138  |  103  |  15  ----------------------------<  106<H>  4.3   |  28  |  0.99    Ca    9.5      25 Sep 2022 15:30    TPro  8.4<H>  /  Alb  3.8  /  TBili  1.0  /  DBili  x   /  AST  32  /  ALT  33  /  AlkPhos  89  09-25          PHYSICAL EXAM:    Vital Signs Last 24 Hrs  T(C): 36.8 (26 Sep 2022 07:28), Max: 37.6 (25 Sep 2022 13:10)  T(F): 98.2 (26 Sep 2022 07:28), Max: 99.6 (25 Sep 2022 13:10)  HR: 68 (26 Sep 2022 07:28) (67 - 89)  BP: 164/92 (26 Sep 2022 07:28) (135/91 - 179/96)  BP(mean): --  RR: 18 (26 Sep 2022 07:28) (17 - 20)  SpO2: 96% (26 Sep 2022 07:28) (96% - 99%)    Parameters below as of 26 Sep 2022 07:28  Patient On (Oxygen Delivery Method): room air        Physical Exam:  General: NAD, Alert, Awake and oriented  Respiratory: Symmetric chest wall expansion bilaterally, no accessory muscle use    RIGHT UE: No open skin. No obvious deformities or other signs of trauma at shoulder, upper arm, elbow, forearm, wrist or hand.  Full baseline painless ROM at shoulder, elbow, wrist, and . No bony TTP. SILT in axillary, radial, median, and ulnar distributions. 2+ radial pulse with brisk cap refill at distal finger tips. Compartments soft and compressible. Strength 5/5.      LEFT UE: No open skin. Contracture noted in L fifth finger, fixed in flexion. No other obvious deformities or other signs of trauma at shoulder, upper arm, elbow, forearm, wrist or hand.  Full baseline painless ROM at shoulder, elbow, wrist, and . No bony TTP. SILT in axillary, radial, median, and ulnar distributions. 2+ radial pulse with brisk cap refill at distal finger tips. Compartments soft and compressible. Strength 5/5.    HIPS and PELVIS: Pelvis stable to AP and Lat compression. Pt reports mild pain with R AP pelvis compression. Able to actively SLR L, unable to SLR right leg 2/2 lower back pain. Negative Log Roll, Negative Heel strike bilaterally. No pain on internal/external rotation of the hips.    RIGHT LE: No open skin. No deformities or other signs of trauma at hip, thigh, knee, leg, ankle or foot. Full baseline painless ROM at hip, knee, ankle and toes with negative log-roll and heel strike. Unable to actively SLR 2/2 R sided muscle spasm. SILT toes 1-5. No bony TTP to hip, knee or ankle. 2+ DP/PT pulses with brisk cap refill distally. Compartments soft and compressible. No pain on passive stretch. Strength 5/5.      LEFT LE: No open skin. No deformities  or other signs of trauma at hip, thigh, knee, leg, ankle or foot.  Full baseline painless ROM at hip, knee, ankle and toe with negative log-roll and heel strike. Able to actively SLR. SILT toes 1-5. No bony TTP to hip, knee or ankle. 2+ DP/PT pulses with brisk cap refill distally. Compartments soft and compressible. No pain on passive stretch. Strength 5/5.        Imaging: CT scan reviewed demonstrating:, Radiographs of R hip demonstrating: No obvious fracture/dislocation/tumor.     Orthopedic A/P:    Pt is a  67y Male with a nondisplaced L ilium fx      -Pain control PRN  -Muscle relaxers  -WBAT B/l LEs w/ assistive devices as needed  -CT Imaging and XR reviewed  -ICE as needed  -PT/OT  -DVT ppx per primary team  -SCDs encouraged  -No acute orthopaedic surgical intervention indicated at present  -Orthopaedically stable for discharge  -Follow up with Dr. Schaefer within 1-2 weeks after discharge for continued care  -Please call the office to make an appointment after discharge  -Discussed with Dr. Schaefer who is aware and approves of plan.    Pt Name: LAURA SALDAÑA    MRN: [id number]    HPI: Patient is a 67y Male presenting after a  Saturday night after drinking Jeniffer. Pt fell backwards going up the stairs, denies HS, LOC. Pt reports he landed on his right side, with right sided "hip" and "back" pain since  and came to the ER Sunday morning. Patient states his main complaints is "Hip pain" and muscle spasm. Pt is an independent ambulator and denies taking anticoagulation. Denies any numbness/tingling, radicular type pain, loss of bowel/bladder control, neurological deficits or weakness.       PAST MEDICAL & SURGICAL HISTORY:  Hypertension      Prostate cancer      Alcohol abuse, daily use      S/P hip replacement, right          Allergies: morphine (Nausea)      Ambulation: Baseline Ambulation [ ] independent [ ] With Cane [ ] With Walker [ ]  Bedbound [ ] Pivot transfers to Wheelchair only                          15.0   7.12  )-----------( 240      ( 25 Sep 2022 15:30 )             43.6     09-25    138  |  103  |  15  ----------------------------<  106<H>  4.3   |  28  |  0.99    Ca    9.5      25 Sep 2022 15:30    TPro  8.4<H>  /  Alb  3.8  /  TBili  1.0  /  DBili  x   /  AST  32  /  ALT  33  /  AlkPhos  89  09-25          PHYSICAL EXAM:    Vital Signs Last 24 Hrs  T(C): 36.8 (26 Sep 2022 07:28), Max: 37.6 (25 Sep 2022 13:10)  T(F): 98.2 (26 Sep 2022 07:28), Max: 99.6 (25 Sep 2022 13:10)  HR: 68 (26 Sep 2022 07:28) (67 - 89)  BP: 164/92 (26 Sep 2022 07:28) (135/91 - 179/96)  BP(mean): --  RR: 18 (26 Sep 2022 07:28) (17 - 20)  SpO2: 96% (26 Sep 2022 07:28) (96% - 99%)    Parameters below as of 26 Sep 2022 07:28  Patient On (Oxygen Delivery Method): room air        Physical Exam:  General: NAD, Alert, Awake and oriented  Respiratory: Symmetric chest wall expansion bilaterally, no accessory muscle use    RIGHT UE: No open skin. No obvious deformities or other signs of trauma at shoulder, upper arm, elbow, forearm, wrist or hand.  Full baseline painless ROM at shoulder, elbow, wrist, and . No bony TTP. SILT in axillary, radial, median, and ulnar distributions. 2+ radial pulse with brisk cap refill at distal finger tips. Compartments soft and compressible. Strength 5/5.      LEFT UE: No open skin. Contracture noted in L fifth finger, fixed in flexion. No other obvious deformities or other signs of trauma at shoulder, upper arm, elbow, forearm, wrist or hand.  Full baseline painless ROM at shoulder, elbow, wrist, and . No bony TTP. SILT in axillary, radial, median, and ulnar distributions. 2+ radial pulse with brisk cap refill at distal finger tips. Compartments soft and compressible. Strength 5/5.    HIPS and PELVIS: Pelvis stable to AP and Lat compression. Pt reports mild pain with R AP pelvis compression. Able to actively SLR L, unable to SLR right leg 2/2 lower back pain. Negative Log Roll, Negative Heel strike bilaterally. No pain on internal/external rotation of the hips.    RIGHT LE: No open skin. No deformities or other signs of trauma at hip, thigh, knee, leg, ankle or foot. Full baseline painless ROM at hip, knee, ankle and toes with negative log-roll and heel strike. Unable to actively SLR 2/2 R sided muscle spasm. SILT toes 1-5. No bony TTP to hip, knee or ankle. 2+ DP/PT pulses with brisk cap refill distally. Compartments soft and compressible. No pain on passive stretch. Strength 5/5.      LEFT LE: No open skin. No deformities  or other signs of trauma at hip, thigh, knee, leg, ankle or foot.  Full baseline painless ROM at hip, knee, ankle and toe with negative log-roll and heel strike. Able to actively SLR. SILT toes 1-5. No bony TTP to hip, knee or ankle. 2+ DP/PT pulses with brisk cap refill distally. Compartments soft and compressible. No pain on passive stretch. Strength 5/5.        Imaging: CT scan reviewed demonstrating: nondisplaced L posterior Ilium fx, Radiographs of R hip demonstrating: No obvious fracture/dislocation/tumor.     Orthopedic A/P:    Pt is a 67y Male with a nondisplaced L ilium fx      -Pain control PRN  -Muscle relaxers  -WBAT B/l LEs w/ assistive devices as needed  -CT Imaging and XR reviewed  -ICE as needed  -PT/OT  -DVT ppx per primary team  -SCDs encouraged  -No acute orthopaedic surgical intervention indicated at present  -Orthopaedically stable for discharge  -Follow up with Dr. Schaefer within 1-2 weeks after discharge for continued care  -Please call the office to make an appointment after discharge  -Discussed with Dr. Schaefer who is aware and approves of plan.

## 2022-09-26 NOTE — ED ADULT NURSE REASSESSMENT NOTE - NS ED NURSE REASSESS COMMENT FT1
pt is stable. v/s stable. talking on his phone in the waiting area. waiting for bed.
pt is stable. watching tv in the waiting area. no new symptoms. waiting for bed. v/s stable.
pt A&Ox4, NAD, offers no complaints of pain at this time. Denies SOB, CP . per attending patient cleared for discharged. pt reports his wife will pick him up.

## 2022-09-26 NOTE — DISCHARGE NOTE PROVIDER - HOSPITAL COURSE
HPI:  67 M with PMH of HTN, prostate cancer who presents with inability to ambulate, nondisplaced L iliac fracture after a fall.     Fell 9/24 PM, was going up the steps to house and fell onto back. No CP, SOB, dizziness, vision changes, before, during or after event. Was able to get back up without any issues or pain. This occurred after drinking 4 glasses of Henes[place] which is a daily occurrence for patient. He is interested in quitting, no history of ETOH withdrawal.     The next day 9/26 had difficulty walking due to pain around lower back and R hip so came in. No fevers, chills. Did not take anything at home for the pain.  (26 Sep 2022 07:17)  Orthopedic A/P:    Pt is a 67y Male with a nondisplaced L ilium fx      -Pain control PRN  -Muscle relaxers  -WBAT B/l LEs w/ assistive devices as needed  -CT Imaging and XR reviewed  -ICE as needed  -PT/OT  -DVT ppx per primary team  -SCDs encouraged  -No acute orthopaedic surgical intervention indicated at present  -Orthopaedically stable for discharge  -Follow up with Dr. Schaefer within 1-2 weeks after discharge for continued care  -Please call the office to make an appointment after discharge  -Discussed with Dr. Schaefer who is aware and approves of plan.

## 2022-09-26 NOTE — DISCHARGE NOTE PROVIDER - NSDCCPCAREPLAN_GEN_ALL_CORE_FT
PRINCIPAL DISCHARGE DIAGNOSIS  Diagnosis: Fracture of iliac crest  Assessment and Plan of Treatment:       SECONDARY DISCHARGE DIAGNOSES  Diagnosis: Fall  Assessment and Plan of Treatment:

## 2022-09-29 DIAGNOSIS — F10.10 ALCOHOL ABUSE, UNCOMPLICATED: ICD-10-CM

## 2022-09-29 DIAGNOSIS — I10 ESSENTIAL (PRIMARY) HYPERTENSION: ICD-10-CM

## 2022-09-29 DIAGNOSIS — Z96.641 PRESENCE OF RIGHT ARTIFICIAL HIP JOINT: ICD-10-CM

## 2022-09-29 DIAGNOSIS — Z88.5 ALLERGY STATUS TO NARCOTIC AGENT: ICD-10-CM
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
## 2022-09-29 DIAGNOSIS — C61 MALIGNANT NEOPLASM OF PROSTATE: ICD-10-CM

## 2022-09-29 DIAGNOSIS — S32.302A UNSPECIFIED FRACTURE OF LEFT ILIUM, INITIAL ENCOUNTER FOR CLOSED FRACTURE: ICD-10-CM
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
## 2022-12-08 NOTE — ED ADULT TRIAGE NOTE - PAIN RATING/NUMBER SCALE (0-10): ACTIVITY
7 Olanzapine Counseling- I discussed with the patient the common side effects of olanzapine including but are not limited to: lack of energy, dry mouth, increased appetite, sleepiness, tremor, constipation, dizziness, changes in behavior, or restlessness.  Explained that teenagers are more likely to experience headaches, abdominal pain, pain in the arms or legs, tiredness, and sleepiness.  Serious side effects include but are not limited: increased risk of death in elderly patients who are confused, have memory loss, or dementia-related psychosis; hyperglycemia; increased cholesterol and triglycerides; and weight gain.

## 2023-04-04 ENCOUNTER — APPOINTMENT (OUTPATIENT)
Dept: CARDIOLOGY | Facility: CLINIC | Age: 68
End: 2023-04-04
Payer: MEDICARE

## 2023-04-04 VITALS
HEART RATE: 71 BPM | WEIGHT: 220 LBS | HEIGHT: 77 IN | SYSTOLIC BLOOD PRESSURE: 116 MMHG | DIASTOLIC BLOOD PRESSURE: 76 MMHG | BODY MASS INDEX: 25.98 KG/M2

## 2023-04-04 DIAGNOSIS — Z92.3 PERSONAL HISTORY OF IRRADIATION: ICD-10-CM

## 2023-04-04 DIAGNOSIS — Z78.9 OTHER SPECIFIED HEALTH STATUS: ICD-10-CM

## 2023-04-04 PROBLEM — C61 MALIGNANT NEOPLASM OF PROSTATE: Chronic | Status: ACTIVE | Noted: 2022-09-26

## 2023-04-04 PROBLEM — F10.10 ALCOHOL ABUSE, UNCOMPLICATED: Chronic | Status: ACTIVE | Noted: 2022-09-26

## 2023-04-04 PROCEDURE — 93000 ELECTROCARDIOGRAM COMPLETE: CPT

## 2023-04-04 PROCEDURE — 99204 OFFICE O/P NEW MOD 45 MIN: CPT

## 2023-04-05 PROBLEM — Z78.9 NEVER SMOKED TOBACCO: Status: ACTIVE | Noted: 2023-04-05

## 2023-04-05 PROBLEM — Z78.9 CAFFEINE USE: Status: ACTIVE | Noted: 2023-04-05

## 2023-04-05 RX ORDER — AMLODIPINE BESYLATE 10 MG/1
10 TABLET ORAL DAILY
Qty: 90 | Refills: 3 | Status: ACTIVE | COMMUNITY

## 2023-04-05 RX ORDER — ATORVASTATIN CALCIUM 10 MG/1
10 TABLET, FILM COATED ORAL
Qty: 30 | Refills: 0 | Status: ACTIVE | COMMUNITY

## 2023-04-05 RX ORDER — VALSARTAN 320 MG/1
320 TABLET ORAL DAILY
Qty: 90 | Refills: 2 | Status: ACTIVE | COMMUNITY

## 2023-04-05 RX ORDER — HYDROCHLOROTHIAZIDE 12.5 MG/1
12.5 TABLET ORAL DAILY
Qty: 30 | Refills: 0 | Status: ACTIVE | COMMUNITY

## 2023-04-06 NOTE — HISTORY OF PRESENT ILLNESS
[FreeTextEntry1] : 67-year-old male with past medical history of hypertension controlled on medication, hyperlipidemia on statin therapy, history of prostate cancer status post radiation therapy, osteo arthritis.  Presents for cardiac evaluation of chest pain which can occur anytime including driving.  He drives and ambulates\par \par ROS: Denies dyspnea, palpitations, dizziness or syncope.\par \par \par \par Labs March 19, 2023\par \par Triglycerides 82 cholesterol 221 HDL 82  none \par \par Creatinine 0.85 AST 23 ALT 24\par \par Hemoglobin A1c 5.5\par

## 2023-04-06 NOTE — DISCUSSION/SUMMARY
[FreeTextEntry1] : 67-year-old male with history of controlled hypertension, hyperlipidemia, prostate cancer status post treatment and radiation presents for follow-up of recent chest pain.\par \par #Chest pain\par Nuclear stress test ordered for evaluation of coronary disease and ischemia\par start aspirin 81 mg\par \par #Hypertension blood pressure is controlled\par Continue with valsartan 320, amlodipine 10, hydrochlorothiazide 12.5\par Low-sodium diet\par \par #Hyperlipidemia\par Continue with atorvastatin\par \par Will advance atorvastatin after follow-up stress test\par \par Follow-up 1 to 2 months after testing\par  [EKG obtained to assist in diagnosis and management of assessed problem(s)] : EKG obtained to assist in diagnosis and management of assessed problem(s)

## 2023-04-18 ENCOUNTER — APPOINTMENT (OUTPATIENT)
Dept: CV DIAGNOSTICS | Facility: HOSPITAL | Age: 68
End: 2023-04-18

## 2023-04-18 ENCOUNTER — OUTPATIENT (OUTPATIENT)
Dept: OUTPATIENT SERVICES | Facility: HOSPITAL | Age: 68
LOS: 1 days | End: 2023-04-18
Payer: MEDICARE

## 2023-04-18 DIAGNOSIS — R07.89 OTHER CHEST PAIN: ICD-10-CM

## 2023-04-18 DIAGNOSIS — Z96.641 PRESENCE OF RIGHT ARTIFICIAL HIP JOINT: Chronic | ICD-10-CM

## 2023-04-18 PROCEDURE — 78452 HT MUSCLE IMAGE SPECT MULT: CPT | Mod: 26,MH

## 2023-04-18 PROCEDURE — 78452 HT MUSCLE IMAGE SPECT MULT: CPT | Mod: MH

## 2023-04-18 PROCEDURE — 93018 CV STRESS TEST I&R ONLY: CPT

## 2023-04-18 PROCEDURE — A9500: CPT

## 2023-04-18 PROCEDURE — 93017 CV STRESS TEST TRACING ONLY: CPT

## 2023-04-18 PROCEDURE — 93016 CV STRESS TEST SUPVJ ONLY: CPT

## 2023-05-10 ENCOUNTER — NON-APPOINTMENT (OUTPATIENT)
Age: 68
End: 2023-05-10

## 2023-05-10 ENCOUNTER — EMERGENCY (EMERGENCY)
Facility: HOSPITAL | Age: 68
LOS: 0 days | Discharge: HOME | End: 2023-05-10
Attending: STUDENT IN AN ORGANIZED HEALTH CARE EDUCATION/TRAINING PROGRAM
Payer: MEDICARE

## 2023-05-10 VITALS
TEMPERATURE: 99 F | DIASTOLIC BLOOD PRESSURE: 78 MMHG | HEART RATE: 88 BPM | RESPIRATION RATE: 18 BRPM | OXYGEN SATURATION: 99 % | SYSTOLIC BLOOD PRESSURE: 122 MMHG

## 2023-05-10 VITALS
DIASTOLIC BLOOD PRESSURE: 81 MMHG | OXYGEN SATURATION: 96 % | HEART RATE: 94 BPM | HEIGHT: 77 IN | TEMPERATURE: 99 F | SYSTOLIC BLOOD PRESSURE: 117 MMHG | RESPIRATION RATE: 18 BRPM | WEIGHT: 220.02 LBS

## 2023-05-10 DIAGNOSIS — R11.2 NAUSEA WITH VOMITING, UNSPECIFIED: ICD-10-CM

## 2023-05-10 DIAGNOSIS — R10.13 EPIGASTRIC PAIN: ICD-10-CM

## 2023-05-10 DIAGNOSIS — R07.89 OTHER CHEST PAIN: ICD-10-CM

## 2023-05-10 DIAGNOSIS — Z20.822 CONTACT WITH AND (SUSPECTED) EXPOSURE TO COVID-19: ICD-10-CM

## 2023-05-10 DIAGNOSIS — I10 ESSENTIAL (PRIMARY) HYPERTENSION: ICD-10-CM

## 2023-05-10 DIAGNOSIS — Z85.46 PERSONAL HISTORY OF MALIGNANT NEOPLASM OF PROSTATE: ICD-10-CM

## 2023-05-10 DIAGNOSIS — Z96.641 PRESENCE OF RIGHT ARTIFICIAL HIP JOINT: Chronic | ICD-10-CM

## 2023-05-10 DIAGNOSIS — R68.83 CHILLS (WITHOUT FEVER): ICD-10-CM

## 2023-05-10 DIAGNOSIS — Z86.59 PERSONAL HISTORY OF OTHER MENTAL AND BEHAVIORAL DISORDERS: ICD-10-CM

## 2023-05-10 DIAGNOSIS — Z88.6 ALLERGY STATUS TO ANALGESIC AGENT: ICD-10-CM

## 2023-05-10 LAB
ALBUMIN SERPL ELPH-MCNC: 3.5 G/DL — SIGNIFICANT CHANGE UP (ref 3.3–5)
ALP SERPL-CCNC: 70 U/L — SIGNIFICANT CHANGE UP (ref 40–120)
ALT FLD-CCNC: 36 U/L — SIGNIFICANT CHANGE UP (ref 12–78)
ANION GAP SERPL CALC-SCNC: 5 MMOL/L — SIGNIFICANT CHANGE UP (ref 5–17)
APPEARANCE UR: CLEAR — SIGNIFICANT CHANGE UP
AST SERPL-CCNC: 22 U/L — SIGNIFICANT CHANGE UP (ref 15–37)
BASOPHILS # BLD AUTO: 0.02 K/UL — SIGNIFICANT CHANGE UP (ref 0–0.2)
BASOPHILS NFR BLD AUTO: 0.2 % — SIGNIFICANT CHANGE UP (ref 0–2)
BILIRUB SERPL-MCNC: 0.8 MG/DL — SIGNIFICANT CHANGE UP (ref 0.2–1.2)
BILIRUB UR-MCNC: NEGATIVE — SIGNIFICANT CHANGE UP
BUN SERPL-MCNC: 17 MG/DL — SIGNIFICANT CHANGE UP (ref 7–23)
CALCIUM SERPL-MCNC: 9.3 MG/DL — SIGNIFICANT CHANGE UP (ref 8.5–10.1)
CHLORIDE SERPL-SCNC: 106 MMOL/L — SIGNIFICANT CHANGE UP (ref 96–108)
CO2 SERPL-SCNC: 26 MMOL/L — SIGNIFICANT CHANGE UP (ref 22–31)
COLOR SPEC: YELLOW — SIGNIFICANT CHANGE UP
CREAT SERPL-MCNC: 0.96 MG/DL — SIGNIFICANT CHANGE UP (ref 0.5–1.3)
DIFF PNL FLD: NEGATIVE — SIGNIFICANT CHANGE UP
EGFR: 88 ML/MIN/1.73M2 — SIGNIFICANT CHANGE UP
EOSINOPHIL # BLD AUTO: 0.03 K/UL — SIGNIFICANT CHANGE UP (ref 0–0.5)
EOSINOPHIL NFR BLD AUTO: 0.2 % — SIGNIFICANT CHANGE UP (ref 0–6)
FLUAV AG NPH QL: SIGNIFICANT CHANGE UP
FLUBV AG NPH QL: SIGNIFICANT CHANGE UP
GLUCOSE SERPL-MCNC: 107 MG/DL — HIGH (ref 70–99)
GLUCOSE UR QL: NEGATIVE MG/DL — SIGNIFICANT CHANGE UP
HCT VFR BLD CALC: 37.1 % — LOW (ref 39–50)
HGB BLD-MCNC: 12.6 G/DL — LOW (ref 13–17)
IMM GRANULOCYTES NFR BLD AUTO: 0.5 % — SIGNIFICANT CHANGE UP (ref 0–0.9)
KETONES UR-MCNC: NEGATIVE — SIGNIFICANT CHANGE UP
LACTATE SERPL-SCNC: 1.3 MMOL/L — SIGNIFICANT CHANGE UP (ref 0.7–2)
LEUKOCYTE ESTERASE UR-ACNC: NEGATIVE — SIGNIFICANT CHANGE UP
LIDOCAIN IGE QN: 81 U/L — SIGNIFICANT CHANGE UP (ref 73–393)
LYMPHOCYTES # BLD AUTO: 0.61 K/UL — LOW (ref 1–3.3)
LYMPHOCYTES # BLD AUTO: 5 % — LOW (ref 13–44)
MCHC RBC-ENTMCNC: 30.1 PG — SIGNIFICANT CHANGE UP (ref 27–34)
MCHC RBC-ENTMCNC: 34 G/DL — SIGNIFICANT CHANGE UP (ref 32–36)
MCV RBC AUTO: 88.8 FL — SIGNIFICANT CHANGE UP (ref 80–100)
MONOCYTES # BLD AUTO: 0.63 K/UL — SIGNIFICANT CHANGE UP (ref 0–0.9)
MONOCYTES NFR BLD AUTO: 5.2 % — SIGNIFICANT CHANGE UP (ref 2–14)
NEUTROPHILS # BLD AUTO: 10.73 K/UL — HIGH (ref 1.8–7.4)
NEUTROPHILS NFR BLD AUTO: 88.9 % — HIGH (ref 43–77)
NITRITE UR-MCNC: NEGATIVE — SIGNIFICANT CHANGE UP
NRBC # BLD: 0 /100 WBCS — SIGNIFICANT CHANGE UP (ref 0–0)
PH UR: 6.5 — SIGNIFICANT CHANGE UP (ref 5–8)
PLATELET # BLD AUTO: 206 K/UL — SIGNIFICANT CHANGE UP (ref 150–400)
POTASSIUM SERPL-MCNC: 4.1 MMOL/L — SIGNIFICANT CHANGE UP (ref 3.5–5.3)
POTASSIUM SERPL-SCNC: 4.1 MMOL/L — SIGNIFICANT CHANGE UP (ref 3.5–5.3)
PROT SERPL-MCNC: 7.3 GM/DL — SIGNIFICANT CHANGE UP (ref 6–8.3)
PROT UR-MCNC: NEGATIVE MG/DL — SIGNIFICANT CHANGE UP
RBC # BLD: 4.18 M/UL — LOW (ref 4.2–5.8)
RBC # FLD: 12.6 % — SIGNIFICANT CHANGE UP (ref 10.3–14.5)
RBC CASTS # UR COMP ASSIST: NEGATIVE /HPF — SIGNIFICANT CHANGE UP (ref 0–4)
SARS-COV-2 RNA SPEC QL NAA+PROBE: SIGNIFICANT CHANGE UP
SODIUM SERPL-SCNC: 137 MMOL/L — SIGNIFICANT CHANGE UP (ref 135–145)
SP GR SPEC: 1 — LOW (ref 1.01–1.02)
TROPONIN I, HIGH SENSITIVITY RESULT: 10.6 NG/L — SIGNIFICANT CHANGE UP
TROPONIN I, HIGH SENSITIVITY RESULT: 9.8 NG/L — SIGNIFICANT CHANGE UP
UROBILINOGEN FLD QL: NEGATIVE MG/DL — SIGNIFICANT CHANGE UP
WBC # BLD: 12.08 K/UL — HIGH (ref 3.8–10.5)
WBC # FLD AUTO: 12.08 K/UL — HIGH (ref 3.8–10.5)
WBC UR QL: NEGATIVE — SIGNIFICANT CHANGE UP

## 2023-05-10 PROCEDURE — 93010 ELECTROCARDIOGRAM REPORT: CPT

## 2023-05-10 PROCEDURE — 71046 X-RAY EXAM CHEST 2 VIEWS: CPT | Mod: 26

## 2023-05-10 PROCEDURE — 74177 CT ABD & PELVIS W/CONTRAST: CPT | Mod: 26,MA

## 2023-05-10 PROCEDURE — 99285 EMERGENCY DEPT VISIT HI MDM: CPT

## 2023-05-10 RX ORDER — ACETAMINOPHEN 500 MG
975 TABLET ORAL ONCE
Refills: 0 | Status: COMPLETED | OUTPATIENT
Start: 2023-05-10 | End: 2023-05-10

## 2023-05-10 RX ORDER — SODIUM CHLORIDE 9 MG/ML
1000 INJECTION INTRAMUSCULAR; INTRAVENOUS; SUBCUTANEOUS ONCE
Refills: 0 | Status: COMPLETED | OUTPATIENT
Start: 2023-05-10 | End: 2023-05-10

## 2023-05-10 RX ORDER — ONDANSETRON 8 MG/1
4 TABLET, FILM COATED ORAL ONCE
Refills: 0 | Status: COMPLETED | OUTPATIENT
Start: 2023-05-10 | End: 2023-05-10

## 2023-05-10 RX ADMIN — ONDANSETRON 4 MILLIGRAM(S): 8 TABLET, FILM COATED ORAL at 14:08

## 2023-05-10 RX ADMIN — SODIUM CHLORIDE 1000 MILLILITER(S): 9 INJECTION INTRAMUSCULAR; INTRAVENOUS; SUBCUTANEOUS at 14:08

## 2023-05-10 RX ADMIN — Medication 975 MILLIGRAM(S): at 14:08

## 2023-05-10 NOTE — ED PROVIDER NOTE - CLINICAL SUMMARY MEDICAL DECISION MAKING FREE TEXT BOX
67M hx htn, c/o nausea, vomiting, sudden onset chills, with epigastric abd cramping/ pain and mild chest pressure that resolved upon ED arrival. 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 Denies etoh use today. Notes abd discomfort resolving. denies dysuria. No associated back pain     - sudden onset nausea/vomiting/ chills suggests possible gastroenteritis or viral syndrome, however noted chills on exam, will rule out intra-abd infcn such as colitis, diverticulitis, appendicitis, pancreatitis   - mild chest discomfort now resolved- atypical chest pain, likely due to vomiting, will obtain cxr, ekg and serial cardiac enzymes in setting of mildly abnml outpt stress 1 month ago  - rule out uti/ pna   - supportive care, serial reassessments

## 2023-05-10 NOTE — ED PROVIDER NOTE - CARE PLAN
1 Principal Discharge DX:	Nausea and vomiting   Principal Discharge DX:	Nausea and vomiting  Secondary Diagnosis:	Abdominal pain

## 2023-05-10 NOTE — ED PROVIDER NOTE - PATIENT PORTAL LINK FT
You can access the FollowMyHealth Patient Portal offered by [facility] by registering at the following website: [email or web address]. By joining [facility]’s FollowMyHealth portal, you will also be able to view your health information using other applications (apps) compatible with our system.

## 2023-05-10 NOTE — ED ADULT NURSE NOTE - OBJECTIVE STATEMENT
PT presented to ER, AOX4 and ambulatory, with c/o chills, n/v and lower abdominal pain since this morning. As per pt, he was working and began feeling chills suddenly along with other cold like symptoms and abdominal discomfort/pain. Denies diarrhea. PMH HTN

## 2023-05-10 NOTE — ED PROVIDER NOTE - PHYSICAL EXAMINATION
Gen:  A XO3, NAD   Head: NCAT  ENT: Airway patent, moist mucous membranes, nasal passageways clear, no pharyngeal erythema or exudates  Cardiac: Normal rate, normal rhythm, no murmurs  Respiratory: Lungs CTA B/L  Gastrointestinal: Abdomen soft, mod epigastric TTP, nondistended, no rebound, no guarding  MSK: No gross abnormalities, FROM of all four extremities, no edema  HEME: Extremities warm, pulses intact and symmetrical in all four extremities  Skin: No rashes, no lesions  Neuro: No gross neurologic deficits

## 2023-05-10 NOTE — ED ADULT TRIAGE NOTE - CCCP TRG CHIEF CMPLNT
Patient sent another myc message asking if the form was found.  Patient can have them refax it if needed.      Myc message sent to patient.    abdominal pain

## 2023-05-10 NOTE — ED PROVIDER NOTE - CCCP TRG CHIEF CMPLNT
TREATMENT PLAN MEETING WAS HELD WITH DR. ELMORE, MISSY RUBIN, RN, AT, [facility] AND
DISCHARGE PLANNER. PLAN FOR DISCHARGE POSSIBLE MONDAY WITH RETURN HOME. DR. ELMORE GAVE ORDERS FOR PT. TO SIGN VOLUNTARY ADMISSION DUE TO PINK SLIP. abdominal pain

## 2023-05-10 NOTE — ED ADULT NURSE NOTE - NSFALLUNIVINTERV_ED_ALL_ED
Bed/Stretcher in lowest position, wheels locked, appropriate side rails in place/Call bell, personal items and telephone in reach/Instruct patient to call for assistance before getting out of bed/chair/stretcher/Non-slip footwear applied when patient is off stretcher/[place] to call system/Physically safe environment - no spills, clutter or unnecessary equipment/Purposeful proactive rounding/Room/bathroom lighting operational, light cord in reach

## 2023-05-10 NOTE — ED PROVIDER NOTE - NSFOLLOWUPINSTRUCTIONS_ED_ALL_ED_FT
You were seen today for nausea/ vomiting that resolved after nausea medicine and fluids. Your heart enzymes were within normal. You had xray and ct scan which did not show infection.     follow up with your primary care physician in 3-5 days. You possibly had an episode of food poisoning. Stay hydrated. Return for any worsening pain, chest pain or shortness of breath.     We reviewed your chart and you had an abnormal stress test last month, please call your cardiologist as soon as possible to discuss these findings to prevent heart attack      SEEK IMMEDIATE MEDICAL CARE IF YOU HAVE ANY OF THE FOLLOWING SYMPTOMS: worsening chest pain, coughing up blood, unexplained back/neck/jaw pain, severe abdominal pain, dizziness or lightheadedness, fainting, shortness of breath, sweaty or clammy skin, vomiting, or racing heart beat. These symptoms may represent a serious problem that is an emergency. Do not wait to see if the symptoms will go away. Get medical help right away. Call 911 and do not drive yourself to the hospital.

## 2023-05-10 NOTE — ED PROVIDER NOTE - OBJECTIVE STATEMENT
67M hx htn, c/o nausea, vomiting, sudden onset chills, resolved upon ED arrival, milld epigatric cramping/ pain 67M hx htn, c/o nausea, vomiting, sudden onset chills, with epigastric abd cramping/ pain and mild chest pressure that resolved upon ED arrival. 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 Denies etoh use today. Notes abd discomfort resolving. denies dysuria. No associated back pain

## 2023-05-10 NOTE — ED PROVIDER NOTE - PROGRESS NOTE DETAILS
pt improved, no recurrence of abd pain or chest discomfort, ekg nonischemic, pt informed of abnml stress test result from last month and need to follow up with cardiologist, serial cardiac enzymes wnl, stable for dc, return precautions discussed,

## 2023-05-11 LAB
CULTURE RESULTS: SIGNIFICANT CHANGE UP
SPECIMEN SOURCE: SIGNIFICANT CHANGE UP

## 2023-05-16 ENCOUNTER — APPOINTMENT (OUTPATIENT)
Dept: CARDIOLOGY | Facility: CLINIC | Age: 68
End: 2023-05-16
Payer: MEDICARE

## 2023-05-16 VITALS
BODY MASS INDEX: 25.39 KG/M2 | HEIGHT: 77 IN | WEIGHT: 215 LBS | DIASTOLIC BLOOD PRESSURE: 77 MMHG | HEART RATE: 78 BPM | SYSTOLIC BLOOD PRESSURE: 118 MMHG

## 2023-05-16 PROCEDURE — 99215 OFFICE O/P EST HI 40 MIN: CPT | Mod: 25

## 2023-05-16 PROCEDURE — 93000 ELECTROCARDIOGRAM COMPLETE: CPT

## 2023-05-17 NOTE — DISCUSSION/SUMMARY
[EKG obtained to assist in diagnosis and management of assessed problem(s)] : EKG obtained to assist in diagnosis and management of assessed problem(s) [FreeTextEntry1] : 67-year-old male with history of controlled hypertension, hyperlipidemia, prostate cancer status post treatment and radiation presents for follow-up of recent chest pain.\par \par #CAD \par Nuclear stress test was abnormal\par start aspirin 81 mg\par I have ordered a cardiac CT for evaluation of coronary artery disease\par \par #Hypertension blood pressure is controlled\par Continue with valsartan 320, amlodipine 10, hydrochlorothiazide 12.5\par Low-sodium diet\par \par #Hyperlipidemia\par Continue with atorvastatin\par \par Follow-up 1 to 2 months after testing\par \par Patient will follow-up with GI regards to diarrhea\par I have told the patient if he feels dizzy or has bleeding in his stool he needs to go to the emergency room\par Patient was seen today by MS family and placed on a regarding diarrhea\par

## 2023-05-17 NOTE — REVIEW OF SYSTEMS
[Feeling Fatigued] : feeling fatigued [Chest Discomfort] : chest discomfort [Change In The Stool] : change in stool [Negative] : Heme/Lymph

## 2023-05-17 NOTE — CARDIOLOGY SUMMARY
[de-identified] : April 4, 2023 sinus rhythm\par May 16, 2023 normal sinus rhythm nonspecific T waves in V4 to V6 [de-identified] : stress testing from April 18, 2023\par Exercise capacity 6 METS poor for age and gender\par Per tensive response during exercise\par On nuclear imaging there was medium size mild defect in the basal to mid anterior walls, anterior lateral walls that were reversible suggestive of ischemia\par large mild to moderate defect in the inferior inferoapical and basal inferoseptal walls that were mostly fixed\par Ejection fraction 59% left ventricular end-diastolic volume 134 cc. \par \par

## 2023-05-17 NOTE — HISTORY OF PRESENT ILLNESS
[FreeTextEntry1] : 67-year-old male with past medical history of hypertension controlled on medication, hyperlipidemia on statin therapy, history of prostate cancer status post radiation therapy, osteo arthritis.  Presents for cardiac evaluation of chest pain which can occur anytime including driving.  He drives and ambulates\par \par Labs March 19, 2023\par \par Triglycerides 82 cholesterol 221 HDL 82  none \par Creatinine 0.85 AST 23 ALT 24\par Hemoglobin A1c 5.5\par \par \par Interval follow up 5/16/2023\par \par Presents for review of stress testing from April 18, 2023\par Exercise capacity 6 METS poor for age and gender\par Per tensive response during exercise\par On nuclear imaging there was medium size mild defect in the basal to mid anterior walls, anterior lateral walls that were reversible suggestive of ischemia\par large mild to moderate defect in the inferior inferoapical and basal inferoseptal walls that were mostly fixed\par Ejection fraction 59% left ventricular end-diastolic volume 134 cc. \par \par Of note patient stated he was recently in urgent care with food poisoning, diarrhea/and vomiting which has now mostly resolved.  He will follow-up with gastroenterology regarding diarrhea which is persistent.\par \par

## 2023-06-14 ENCOUNTER — APPOINTMENT (OUTPATIENT)
Dept: CT IMAGING | Facility: CLINIC | Age: 68
End: 2023-06-14
Payer: MEDICARE

## 2023-06-14 ENCOUNTER — OUTPATIENT (OUTPATIENT)
Dept: OUTPATIENT SERVICES | Facility: HOSPITAL | Age: 68
LOS: 1 days | End: 2023-06-14
Payer: MEDICARE

## 2023-06-14 DIAGNOSIS — R94.39 ABNORMAL RESULT OF OTHER CARDIOVASCULAR FUNCTION STUDY: ICD-10-CM

## 2023-06-14 DIAGNOSIS — Z96.641 PRESENCE OF RIGHT ARTIFICIAL HIP JOINT: Chronic | ICD-10-CM

## 2023-06-14 PROCEDURE — 75574 CT ANGIO HRT W/3D IMAGE: CPT

## 2023-06-14 PROCEDURE — 75574 CT ANGIO HRT W/3D IMAGE: CPT | Mod: 26

## 2023-06-26 ENCOUNTER — APPOINTMENT (OUTPATIENT)
Dept: ORTHOPEDIC SURGERY | Facility: CLINIC | Age: 68
End: 2023-06-26
Payer: MEDICARE

## 2023-06-26 VITALS — HEIGHT: 77 IN | WEIGHT: 217 LBS | BODY MASS INDEX: 25.62 KG/M2

## 2023-06-26 DIAGNOSIS — M72.0 PALMAR FASCIAL FIBROMATOSIS [DUPUYTREN]: ICD-10-CM

## 2023-06-26 PROCEDURE — 99214 OFFICE O/P EST MOD 30 MIN: CPT

## 2023-07-14 ENCOUNTER — APPOINTMENT (OUTPATIENT)
Age: 68
End: 2023-07-14

## 2023-07-14 NOTE — HISTORY OF PRESENT ILLNESS
[de-identified] : 06/26/2023  LAURA 67 year M is here for\par Location:Left Small Finger\par Complaints: Patient reports more than 2 years of progressive left small finger contracture.  He now notes difficulty with activities of daily living as well as grasp.\par Duration of symptoms: 2 years or longer \par Prior treatments:  (2 year ago) - previously offered palmar fasciectomy but was unable to proceed due to work obligations\par Hand dominance: Right \par Occupation:  for an [employer] service

## 2023-07-14 NOTE — IMAGING
[de-identified] : Small finger\par No wounds\par Firm pretendinous and spiral cords\par MCP 45 degree flexion contracture\par PIP 90 degree flexion contracture\par DIP 45 degree flexion contracture\par NVI

## 2023-07-14 NOTE — DISCUSSION/SUMMARY
[de-identified] : 68yo RHD male with Left small finger dupuytren's contracture\par Options discussed xiaflex injections vs. palmar fasciectomy with capsulotomy and collateral ligament releases\par Risks, benefits and alternatives discussed\par Risks include, but are not limited to wound complications, infection, neurovascular injury, pain, disability, recurrence and need for additional procedures in the future\par We had a very lengthy conversation reviewing perioperative expectations and risks\par Questions answered.\par The patient expressed understanding and would like to proceed with left small finger palmar fasciectomy with capsulotomy and collateral ligament releases\par Follow-up 2 weeks postoperatively\par \par \par \par

## 2023-07-14 NOTE — PHYSICAL EXAM
[Left] : left hand [5th Finger] : 5th finger [] : no erythema [de-identified] : Dupuytren's contracture 5th finger: DIP 45, PIP 90, MCP 45 [FreeTextEntry9] : %

## 2023-07-24 ENCOUNTER — APPOINTMENT (OUTPATIENT)
Dept: ORTHOPEDIC SURGERY | Facility: CLINIC | Age: 68
End: 2023-07-24

## 2023-07-25 ENCOUNTER — APPOINTMENT (OUTPATIENT)
Dept: CARDIOLOGY | Facility: CLINIC | Age: 68
End: 2023-07-25
Payer: MEDICARE

## 2023-07-25 DIAGNOSIS — R07.89 OTHER CHEST PAIN: ICD-10-CM

## 2023-07-25 DIAGNOSIS — R94.39 ABNORMAL RESULT OF OTHER CARDIOVASCULAR FUNCTION STUDY: ICD-10-CM

## 2023-07-25 PROCEDURE — 93000 ELECTROCARDIOGRAM COMPLETE: CPT

## 2023-07-25 PROCEDURE — 99215 OFFICE O/P EST HI 40 MIN: CPT | Mod: 25

## 2023-07-26 NOTE — ED ADULT NURSE NOTE - CHIEF COMPLAINT
The patient is a 66y Male complaining of  Soolantra Pregnancy And Lactation Text: This medication is Pregnancy Category C. This medication is considered safe during breast feeding.

## 2023-07-28 VITALS — DIASTOLIC BLOOD PRESSURE: 80 MMHG | HEART RATE: 80 BPM | SYSTOLIC BLOOD PRESSURE: 125 MMHG

## 2023-07-28 PROBLEM — R07.89 CHEST DISCOMFORT: Status: RESOLVED | Noted: 2023-04-04 | Resolved: 2023-07-28

## 2023-07-28 PROBLEM — R94.39 POSITIVE CARDIAC STRESS TEST: Status: RESOLVED | Noted: 2023-05-17 | Resolved: 2023-07-28

## 2023-07-28 NOTE — REVIEW OF SYSTEMS
[Change In The Stool] : change in stool [Negative] : Heme/Lymph [Feeling Fatigued] : not feeling fatigued [Chest Discomfort] : no chest discomfort

## 2023-07-28 NOTE — DISCUSSION/SUMMARY
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 if stable we will get echo annually to follow root.\par \par #Hypertension blood pressure is controlled\par Continue with valsartan 320, amlodipine 10, hydrochlorothiazide 12.5\par Low-sodium diet\par \par #Hyperlipidemia\par Continue with atorvastatin\par \par # preop evaluation for EGD/colonoscopy\par EKG sinus, EF 59%, Cardiac CT mid CAD , aortic root 4.38cm \par Functional Status: Mets >6\par No evidence of angina, heart failure, arrhythmia \par hypertension is controlled on Medication.\par May Hold Aspirin 81mg 7 days  before procedure,  if needed.\par \par There are no current cardiac contraindications to planned GI procedure (EGD, Colonoscopy) recommendation above. Please call if there are any further questions.\par \par \par  [EKG obtained to assist in diagnosis and management of assessed problem(s)] : EKG obtained to assist in diagnosis and management of assessed problem(s)

## 2023-07-28 NOTE — HISTORY OF PRESENT ILLNESS
[FreeTextEntry1] : 67-year-old male with past medical history of hypertension controlled on medication, hyperlipidemia on statin therapy, history of prostate cancer status post radiation therapy, osteo arthritis.  Presents for cardiac evaluation of chest pain\par \par Labs March 19, 2023\par \par Triglycerides 82 cholesterol 221 HDL 82  none \par Creatinine 0.85 AST 23 ALT 24\par Hemoglobin A1c 5.5\par \par \par Interval follow up 5/16/2023\par \par Presents for review of stress testing from April 18, 2023\par Exercise capacity 6 METS poor for age and gender\par Per tensive response during exercise\par On nuclear imaging there was medium size mild defect in the basal to mid anterior walls, anterior lateral walls that were reversible suggestive of ischemia\par large mild to moderate defect in the inferior inferoapical and basal inferoseptal walls that were mostly fixed\par Ejection fraction 59% left ventricular end-diastolic volume 134 cc. \par \par Of note patient stated he was recently in urgent care with food poisoning, diarrhea/and vomiting which has now mostly resolved.  He will follow-up with gastroenterology regarding diarrhea which is persistent.\par \par \par Interval follow up 7/25/2023\par \par Presents for follow up, to review cardiac Ct and for clearance prior to  egd/colonoscopy\par Cardiac CT completed and reveiwed with pt. Mild CAD. Mildly dilated aortic root to 4.38cm \par \par ROS: Denies Chest pain, dyspnea, palpitations, dizziness or syncope.\par \par \par

## 2023-07-28 NOTE — CARDIOLOGY SUMMARY
[de-identified] : April 4, 2023 sinus rhythm\par May 16, 2023 normal sinus rhythm nonspecific T waves in V4 to V6\par 7/25/2023 sinus, NSST changes [de-identified] : stress testing from April 18, 2023\par Exercise capacity 6 METS poor for age and gender\par Per tensive response during exercise\par On nuclear imaging there was medium size mild defect in the basal to mid anterior walls, anterior lateral walls that were reversible suggestive of ischemia\par large mild to moderate defect in the inferior inferoapical and basal inferoseptal walls that were mostly fixed\par Ejection fraction 59% left ventricular end-diastolic volume 134 cc. \par \par
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
## 2023-10-01 PROBLEM — Z92.3 HISTORY OF RADIATION THERAPY: Status: RESOLVED | Noted: 2023-04-06 | Resolved: 2023-10-01

## 2023-10-14 NOTE — ED PROVIDER NOTE - ST/T WAVE
Airway patent, TM normal bilaterally, normal appearing mouth, nose, throat, neck supple with full range of motion, no cervical adenopathy. pharynx clear uvula midline no exudates noted nose . NO cervical lymphadenopathy not tender to palpation no royce/ std

## 2023-11-10 NOTE — ED PROVIDER NOTE - BIRTH SEX
Patient Quality Outreach    Patient is due for the following:   Diabetes -  Diabetic Follow-Up Visit    Next Steps:   Patient has upcoming appointment, these items will be addressed at that time.    Type of outreach:    Chart review performed, no outreach needed.  Pt's diabetes is followed by Endo and next visit is on 2/16/23.      Questions for provider review:    None           Narayan Maddox Jr., MA        
Male

## 2023-11-14 ENCOUNTER — APPOINTMENT (OUTPATIENT)
Dept: CARDIOLOGY | Facility: CLINIC | Age: 68
End: 2023-11-14
Payer: MEDICARE

## 2023-11-14 DIAGNOSIS — I77.810 THORACIC AORTIC ECTASIA: ICD-10-CM

## 2023-11-14 DIAGNOSIS — Z01.810 ENCOUNTER FOR PREPROCEDURAL CARDIOVASCULAR EXAMINATION: ICD-10-CM

## 2023-11-14 PROCEDURE — 93000 ELECTROCARDIOGRAM COMPLETE: CPT

## 2023-11-14 PROCEDURE — 99215 OFFICE O/P EST HI 40 MIN: CPT | Mod: 25

## 2023-11-20 VITALS
BODY MASS INDEX: 26.33 KG/M2 | DIASTOLIC BLOOD PRESSURE: 80 MMHG | HEART RATE: 77 BPM | HEIGHT: 77 IN | SYSTOLIC BLOOD PRESSURE: 132 MMHG | WEIGHT: 223 LBS

## 2023-11-20 PROBLEM — Z01.810 PREOP CARDIOVASCULAR EXAM: Status: ACTIVE | Noted: 2023-11-20

## 2023-11-20 PROBLEM — I77.810 AORTIC ROOT DILATION: Status: ACTIVE | Noted: 2023-07-28

## 2023-11-30 ENCOUNTER — TRANSCRIPTION ENCOUNTER (OUTPATIENT)
Age: 68
End: 2023-11-30

## 2023-11-30 ENCOUNTER — INPATIENT (INPATIENT)
Facility: HOSPITAL | Age: 68
LOS: 4 days | Discharge: HOME | End: 2023-12-05
Attending: INTERNAL MEDICINE | Admitting: INTERNAL MEDICINE
Payer: MEDICARE

## 2023-11-30 VITALS
TEMPERATURE: 98 F | WEIGHT: 229.94 LBS | DIASTOLIC BLOOD PRESSURE: 82 MMHG | SYSTOLIC BLOOD PRESSURE: 149 MMHG | HEIGHT: 77 IN | HEART RATE: 66 BPM | RESPIRATION RATE: 18 BRPM | OXYGEN SATURATION: 97 %

## 2023-11-30 DIAGNOSIS — Z96.641 PRESENCE OF RIGHT ARTIFICIAL HIP JOINT: Chronic | ICD-10-CM

## 2023-11-30 LAB
ALBUMIN SERPL ELPH-MCNC: 3.3 G/DL — SIGNIFICANT CHANGE UP (ref 3.3–5)
ALBUMIN SERPL ELPH-MCNC: 3.3 G/DL — SIGNIFICANT CHANGE UP (ref 3.3–5)
ALP SERPL-CCNC: 82 U/L — SIGNIFICANT CHANGE UP (ref 40–120)
ALP SERPL-CCNC: 82 U/L — SIGNIFICANT CHANGE UP (ref 40–120)
ALT FLD-CCNC: 35 U/L — SIGNIFICANT CHANGE UP (ref 12–78)
ALT FLD-CCNC: 35 U/L — SIGNIFICANT CHANGE UP (ref 12–78)
ANION GAP SERPL CALC-SCNC: 3 MMOL/L — LOW (ref 5–17)
ANION GAP SERPL CALC-SCNC: 3 MMOL/L — LOW (ref 5–17)
APPEARANCE UR: ABNORMAL
APPEARANCE UR: ABNORMAL
AST SERPL-CCNC: 33 U/L — SIGNIFICANT CHANGE UP (ref 15–37)
AST SERPL-CCNC: 33 U/L — SIGNIFICANT CHANGE UP (ref 15–37)
BACTERIA # UR AUTO: ABNORMAL /HPF
BACTERIA # UR AUTO: ABNORMAL /HPF
BASOPHILS # BLD AUTO: 0.03 K/UL — SIGNIFICANT CHANGE UP (ref 0–0.2)
BASOPHILS # BLD AUTO: 0.03 K/UL — SIGNIFICANT CHANGE UP (ref 0–0.2)
BASOPHILS NFR BLD AUTO: 0.4 % — SIGNIFICANT CHANGE UP (ref 0–2)
BASOPHILS NFR BLD AUTO: 0.4 % — SIGNIFICANT CHANGE UP (ref 0–2)
BILIRUB SERPL-MCNC: 0.6 MG/DL — SIGNIFICANT CHANGE UP (ref 0.2–1.2)
BILIRUB SERPL-MCNC: 0.6 MG/DL — SIGNIFICANT CHANGE UP (ref 0.2–1.2)
BILIRUB UR-MCNC: NEGATIVE — SIGNIFICANT CHANGE UP
BILIRUB UR-MCNC: NEGATIVE — SIGNIFICANT CHANGE UP
BUN SERPL-MCNC: 18 MG/DL — SIGNIFICANT CHANGE UP (ref 7–23)
BUN SERPL-MCNC: 18 MG/DL — SIGNIFICANT CHANGE UP (ref 7–23)
CALCIUM SERPL-MCNC: 9.3 MG/DL — SIGNIFICANT CHANGE UP (ref 8.5–10.1)
CALCIUM SERPL-MCNC: 9.3 MG/DL — SIGNIFICANT CHANGE UP (ref 8.5–10.1)
CHLORIDE SERPL-SCNC: 112 MMOL/L — HIGH (ref 96–108)
CHLORIDE SERPL-SCNC: 112 MMOL/L — HIGH (ref 96–108)
CO2 SERPL-SCNC: 27 MMOL/L — SIGNIFICANT CHANGE UP (ref 22–31)
CO2 SERPL-SCNC: 27 MMOL/L — SIGNIFICANT CHANGE UP (ref 22–31)
COLOR SPEC: YELLOW — SIGNIFICANT CHANGE UP
COLOR SPEC: YELLOW — SIGNIFICANT CHANGE UP
CREAT SERPL-MCNC: 0.87 MG/DL — SIGNIFICANT CHANGE UP (ref 0.5–1.3)
CREAT SERPL-MCNC: 0.87 MG/DL — SIGNIFICANT CHANGE UP (ref 0.5–1.3)
DIFF PNL FLD: ABNORMAL
DIFF PNL FLD: ABNORMAL
EGFR: 95 ML/MIN/1.73M2 — SIGNIFICANT CHANGE UP
EGFR: 95 ML/MIN/1.73M2 — SIGNIFICANT CHANGE UP
EOSINOPHIL # BLD AUTO: 0.06 K/UL — SIGNIFICANT CHANGE UP (ref 0–0.5)
EOSINOPHIL # BLD AUTO: 0.06 K/UL — SIGNIFICANT CHANGE UP (ref 0–0.5)
EOSINOPHIL NFR BLD AUTO: 0.7 % — SIGNIFICANT CHANGE UP (ref 0–6)
EOSINOPHIL NFR BLD AUTO: 0.7 % — SIGNIFICANT CHANGE UP (ref 0–6)
EPI CELLS # UR: PRESENT
EPI CELLS # UR: PRESENT
GLUCOSE SERPL-MCNC: 113 MG/DL — HIGH (ref 70–99)
GLUCOSE SERPL-MCNC: 113 MG/DL — HIGH (ref 70–99)
GLUCOSE UR QL: NEGATIVE MG/DL — SIGNIFICANT CHANGE UP
GLUCOSE UR QL: NEGATIVE MG/DL — SIGNIFICANT CHANGE UP
HCT VFR BLD CALC: 38.9 % — LOW (ref 39–50)
HCT VFR BLD CALC: 38.9 % — LOW (ref 39–50)
HGB BLD-MCNC: 12.8 G/DL — LOW (ref 13–17)
HGB BLD-MCNC: 12.8 G/DL — LOW (ref 13–17)
IMM GRANULOCYTES NFR BLD AUTO: 0.4 % — SIGNIFICANT CHANGE UP (ref 0–0.9)
IMM GRANULOCYTES NFR BLD AUTO: 0.4 % — SIGNIFICANT CHANGE UP (ref 0–0.9)
KETONES UR-MCNC: NEGATIVE MG/DL — SIGNIFICANT CHANGE UP
KETONES UR-MCNC: NEGATIVE MG/DL — SIGNIFICANT CHANGE UP
LACTATE SERPL-SCNC: 1 MMOL/L — SIGNIFICANT CHANGE UP (ref 0.7–2)
LACTATE SERPL-SCNC: 1 MMOL/L — SIGNIFICANT CHANGE UP (ref 0.7–2)
LEUKOCYTE ESTERASE UR-ACNC: ABNORMAL
LEUKOCYTE ESTERASE UR-ACNC: ABNORMAL
LIDOCAIN IGE QN: 37 U/L — SIGNIFICANT CHANGE UP (ref 13–75)
LIDOCAIN IGE QN: 37 U/L — SIGNIFICANT CHANGE UP (ref 13–75)
LYMPHOCYTES # BLD AUTO: 1.05 K/UL — SIGNIFICANT CHANGE UP (ref 1–3.3)
LYMPHOCYTES # BLD AUTO: 1.05 K/UL — SIGNIFICANT CHANGE UP (ref 1–3.3)
LYMPHOCYTES # BLD AUTO: 12.3 % — LOW (ref 13–44)
LYMPHOCYTES # BLD AUTO: 12.3 % — LOW (ref 13–44)
MCHC RBC-ENTMCNC: 28.3 PG — SIGNIFICANT CHANGE UP (ref 27–34)
MCHC RBC-ENTMCNC: 28.3 PG — SIGNIFICANT CHANGE UP (ref 27–34)
MCHC RBC-ENTMCNC: 32.9 G/DL — SIGNIFICANT CHANGE UP (ref 32–36)
MCHC RBC-ENTMCNC: 32.9 G/DL — SIGNIFICANT CHANGE UP (ref 32–36)
MCV RBC AUTO: 86.1 FL — SIGNIFICANT CHANGE UP (ref 80–100)
MCV RBC AUTO: 86.1 FL — SIGNIFICANT CHANGE UP (ref 80–100)
MONOCYTES # BLD AUTO: 0.53 K/UL — SIGNIFICANT CHANGE UP (ref 0–0.9)
MONOCYTES # BLD AUTO: 0.53 K/UL — SIGNIFICANT CHANGE UP (ref 0–0.9)
MONOCYTES NFR BLD AUTO: 6.2 % — SIGNIFICANT CHANGE UP (ref 2–14)
MONOCYTES NFR BLD AUTO: 6.2 % — SIGNIFICANT CHANGE UP (ref 2–14)
NEUTROPHILS # BLD AUTO: 6.82 K/UL — SIGNIFICANT CHANGE UP (ref 1.8–7.4)
NEUTROPHILS # BLD AUTO: 6.82 K/UL — SIGNIFICANT CHANGE UP (ref 1.8–7.4)
NEUTROPHILS NFR BLD AUTO: 80 % — HIGH (ref 43–77)
NEUTROPHILS NFR BLD AUTO: 80 % — HIGH (ref 43–77)
NITRITE UR-MCNC: NEGATIVE — SIGNIFICANT CHANGE UP
NITRITE UR-MCNC: NEGATIVE — SIGNIFICANT CHANGE UP
NRBC # BLD: 0 /100 WBCS — SIGNIFICANT CHANGE UP (ref 0–0)
NRBC # BLD: 0 /100 WBCS — SIGNIFICANT CHANGE UP (ref 0–0)
PH UR: 7 — SIGNIFICANT CHANGE UP (ref 5–8)
PH UR: 7 — SIGNIFICANT CHANGE UP (ref 5–8)
PLATELET # BLD AUTO: 241 K/UL — SIGNIFICANT CHANGE UP (ref 150–400)
PLATELET # BLD AUTO: 241 K/UL — SIGNIFICANT CHANGE UP (ref 150–400)
POTASSIUM SERPL-MCNC: 4.4 MMOL/L — SIGNIFICANT CHANGE UP (ref 3.5–5.3)
POTASSIUM SERPL-MCNC: 4.4 MMOL/L — SIGNIFICANT CHANGE UP (ref 3.5–5.3)
POTASSIUM SERPL-SCNC: 4.4 MMOL/L — SIGNIFICANT CHANGE UP (ref 3.5–5.3)
POTASSIUM SERPL-SCNC: 4.4 MMOL/L — SIGNIFICANT CHANGE UP (ref 3.5–5.3)
PROT SERPL-MCNC: 7.4 GM/DL — SIGNIFICANT CHANGE UP (ref 6–8.3)
PROT SERPL-MCNC: 7.4 GM/DL — SIGNIFICANT CHANGE UP (ref 6–8.3)
PROT UR-MCNC: 30 MG/DL
PROT UR-MCNC: 30 MG/DL
RBC # BLD: 4.52 M/UL — SIGNIFICANT CHANGE UP (ref 4.2–5.8)
RBC # BLD: 4.52 M/UL — SIGNIFICANT CHANGE UP (ref 4.2–5.8)
RBC # FLD: 13 % — SIGNIFICANT CHANGE UP (ref 10.3–14.5)
RBC # FLD: 13 % — SIGNIFICANT CHANGE UP (ref 10.3–14.5)
RBC CASTS # UR COMP ASSIST: >50 /HPF — SIGNIFICANT CHANGE UP (ref 0–4)
RBC CASTS # UR COMP ASSIST: >50 /HPF — SIGNIFICANT CHANGE UP (ref 0–4)
SODIUM SERPL-SCNC: 142 MMOL/L — SIGNIFICANT CHANGE UP (ref 135–145)
SODIUM SERPL-SCNC: 142 MMOL/L — SIGNIFICANT CHANGE UP (ref 135–145)
SP GR SPEC: 1.02 — SIGNIFICANT CHANGE UP (ref 1–1.03)
SP GR SPEC: 1.02 — SIGNIFICANT CHANGE UP (ref 1–1.03)
UROBILINOGEN FLD QL: 1 MG/DL — SIGNIFICANT CHANGE UP (ref 0.2–1)
UROBILINOGEN FLD QL: 1 MG/DL — SIGNIFICANT CHANGE UP (ref 0.2–1)
WBC # BLD: 8.52 K/UL — SIGNIFICANT CHANGE UP (ref 3.8–10.5)
WBC # BLD: 8.52 K/UL — SIGNIFICANT CHANGE UP (ref 3.8–10.5)
WBC # FLD AUTO: 8.52 K/UL — SIGNIFICANT CHANGE UP (ref 3.8–10.5)
WBC # FLD AUTO: 8.52 K/UL — SIGNIFICANT CHANGE UP (ref 3.8–10.5)
WBC UR QL: SIGNIFICANT CHANGE UP /HPF (ref 0–5)
WBC UR QL: SIGNIFICANT CHANGE UP /HPF (ref 0–5)

## 2023-11-30 PROCEDURE — 99285 EMERGENCY DEPT VISIT HI MDM: CPT

## 2023-11-30 PROCEDURE — 93010 ELECTROCARDIOGRAM REPORT: CPT

## 2023-11-30 PROCEDURE — 74176 CT ABD & PELVIS W/O CONTRAST: CPT | Mod: 26,MA

## 2023-11-30 RX ORDER — ONDANSETRON 8 MG/1
4 TABLET, FILM COATED ORAL ONCE
Refills: 0 | Status: COMPLETED | OUTPATIENT
Start: 2023-11-30 | End: 2023-11-30

## 2023-11-30 RX ORDER — KETOROLAC TROMETHAMINE 30 MG/ML
15 SYRINGE (ML) INJECTION ONCE
Refills: 0 | Status: DISCONTINUED | OUTPATIENT
Start: 2023-11-30 | End: 2023-11-30

## 2023-11-30 RX ORDER — SODIUM CHLORIDE 9 MG/ML
1000 INJECTION INTRAMUSCULAR; INTRAVENOUS; SUBCUTANEOUS ONCE
Refills: 0 | Status: COMPLETED | OUTPATIENT
Start: 2023-11-30 | End: 2023-11-30

## 2023-11-30 RX ORDER — ACETAMINOPHEN 500 MG
1000 TABLET ORAL ONCE
Refills: 0 | Status: COMPLETED | OUTPATIENT
Start: 2023-11-30 | End: 2023-11-30

## 2023-11-30 RX ORDER — MORPHINE SULFATE 50 MG/1
4 CAPSULE, EXTENDED RELEASE ORAL ONCE
Refills: 0 | Status: DISCONTINUED | OUTPATIENT
Start: 2023-11-30 | End: 2023-11-30

## 2023-11-30 RX ADMIN — Medication 1000 MILLIGRAM(S): at 18:39

## 2023-11-30 RX ADMIN — Medication 15 MILLIGRAM(S): at 23:38

## 2023-11-30 RX ADMIN — Medication 15 MILLIGRAM(S): at 20:13

## 2023-11-30 RX ADMIN — SODIUM CHLORIDE 1000 MILLILITER(S): 9 INJECTION INTRAMUSCULAR; INTRAVENOUS; SUBCUTANEOUS at 18:08

## 2023-11-30 RX ADMIN — Medication 400 MILLIGRAM(S): at 18:24

## 2023-11-30 RX ADMIN — Medication 15 MILLIGRAM(S): at 18:22

## 2023-11-30 RX ADMIN — Medication 1000 MILLIGRAM(S): at 18:40

## 2023-11-30 RX ADMIN — MORPHINE SULFATE 4 MILLIGRAM(S): 50 CAPSULE, EXTENDED RELEASE ORAL at 23:34

## 2023-11-30 RX ADMIN — SODIUM CHLORIDE 1000 MILLILITER(S): 9 INJECTION INTRAMUSCULAR; INTRAVENOUS; SUBCUTANEOUS at 23:02

## 2023-11-30 RX ADMIN — Medication 15 MILLIGRAM(S): at 18:07

## 2023-11-30 RX ADMIN — ONDANSETRON 4 MILLIGRAM(S): 8 TABLET, FILM COATED ORAL at 18:06

## 2023-11-30 NOTE — ED ADULT NURSE NOTE - OBJECTIVE STATEMENT
Pt is a 67yo Male AAOx4 PMH HTN, kidney stones complaining of abdominal pain 10/10 following surgery for kidney stone today at [facility]. Pt reports thinking he may be passing the stone as the surgery was intended to break it up. Pt endorses vomiting yellow bile since being discharged from the hospital.  Pt denies headache and SOB. Pt currently lying in stretcher requesting pain medication.

## 2023-11-30 NOTE — ED PROVIDER NOTE - PROGRESS NOTE DETAILS
Dhaliwal DO: pt stable, improved, however still wary of recurrent pain as had recurrent pain requiring morphine, pt encouraged for outpt follow up, will monitor further per pts request, if stable plan for dc but if recurrent difficulty to manage pain then may consider urology eval/admission MD Gabby: Pt signed out to me by Dr. Dhaliwal as reassess renal colic pain s/p meds and fluids. Pt still reports pain. no carmen, obstructing stones on ct 4 mm, no uti. Pt has received iv tylenol, percocet, toradol x2, morphine, still in pain.  recs for admission to medicine for renal colic pain and  to follow with recs pending cr trend on am labs, repeat abdominal exam..

## 2023-11-30 NOTE — ED ADULT NURSE NOTE - NSICDXPASTMEDICALHX_GEN_ALL_CORE_FT
Cheilitis Normal Treatment: I recommended application of Vaseline or Aquaphor numerous times a day (as often as every hour) and before going to bed. PAST MEDICAL HISTORY:  Alcohol abuse, daily use     Hypertension     Prostate cancer

## 2023-11-30 NOTE — ED ADULT NURSE NOTE - ED STAT RN HANDOFF DETAILS 2
handoff given to DAMARIS Novoa pt general condition reamins stable endorsed for continued care. safety precaution in place.

## 2023-11-30 NOTE — ED ADULT NURSE NOTE - NSFALLUNIVINTERV_ED_ALL_ED
Bed/Stretcher in lowest position, wheels locked, appropriate side rails in place/Call bell, personal items and telephone in reach/Instruct patient to call for assistance before getting out of bed/chair/stretcher/Non-slip footwear applied when patient is off stretcher/[place] to call system/Physically safe environment - no spills, clutter or unnecessary equipment/Purposeful proactive rounding/Room/bathroom lighting operational, light cord in reach

## 2023-11-30 NOTE — ED PROVIDER NOTE - PHYSICAL EXAMINATION
Gen: aox3, nad   Head: NCAT  ENT: Airway patent, dry  mucous membranes, nasal passageways clear   Cardiac: Normal rate, normal rhythm, no murmurs   Respiratory: Lungs CTA B/L  Gastrointestinal: Abdomen soft, mild epigastric ttp, nondistended, no rebound, no guarding  MSK: No gross abnormalities, FROM of all four extremities, no edema  HEME: Extremities warm, pulses intact and symmetrical in all four extremities  Skin: No rashes, no lesions  Neuro: No gross neurologic deficits,

## 2023-11-30 NOTE — ED PROVIDER NOTE - CLINICAL SUMMARY MEDICAL DECISION MAKING FREE TEXT BOX
68M pmhx HTN and kidney stones, who presents today for right flank pain, nausea, vomiting, onset after getting home from 'kidney stone procedure' today at [facility]- reports no stent was placed. He denies chest pain, sob, abd pain or difficulty urinating or fevers/chills.   - tx symptomatically with anti-emetics, analgesia, fluids, check labs to eval for metabolic derangements, only mild ttp epigastrium, ct a/p eval for retained nephrolithiasis/ obstructive nephrolithiasis, reassess. 68M pmhx HTN and kidney stones, who presents today for right flank pain, nausea, vomiting, onset after getting home from 'kidney stone procedure' today at [facility]- reports no stent was placed. He denies chest pain, sob, abd pain or difficulty urinating or fevers/chills.   - tx symptomatically with anti-emetics, analgesia, fluids, check labs to eval for metabolic derangements, only mild ttp epigastrium, ct a/p eval for retained nephrolithiasis/ obstructive nephrolithiasis, reassess. 68M pmhx HTN and kidney stones, who presents today for right flank pain, nausea, vomiting, onset after getting home from 'kidney stone procedure' today at [facility]- reports no stent was placed. He denies chest pain, sob, abd pain or difficulty urinating or fevers/chills.   - tx symptomatically with anti-emetics, analgesia, fluids, check labs to eval for metabolic derangements, only mild ttp epigastrium, ct a/p eval for retained nephrolithiasis/ obstructive nephrolithiasis, reassess. 68M pmhx HTN and kidney stones, who presents today for right flank pain, nausea, vomiting, onset after getting home from 'kidney stone procedure' today at [facility]- reports no stent was placed. He denies chest pain, sob, abd pain or difficulty urinating or fevers/chills.   - tx symptomatically with anti-emetics, analgesia, fluids, check labs to eval for metabolic derangements, only mild ttp epigastrium, ct a/p eval for retained nephrolithiasis/ obstructive nephrolithiasis, reassess. admission if not improved with hydration/ pain control 68M pmhx HTN and kidney stones, who presents today for right flank pain, nausea, vomiting, onset after getting home from 'kidney stone procedure' today at [facility]- reports no stent was placed. He denies chest pain, sob, abd pain or difficulty urinating or fevers/chills.   - tx symptomatically with anti-emetics, analgesia, fluids, check labs to eval for metabolic derangements, only mild ttp epigastrium, ct a/p eval for retained nephrolithiasis/ obstructive nephrolithiasis, reassess. admission if not improved with hydration/ pain control 68M pmhx HTN and kidney stones, who presents today for right flank pain, nausea, vomiting, onset after getting home from 'kidney stone procedure' today at [facility]- reports no stent was placed. He denies chest pain, sob, abd pain or difficulty urinating or fevers/chills.   - tx symptomatically with anti-emetics, analgesia, fluids, check labs to eval for metabolic derangements, only mild ttp epigastrium, ct a/p eval for retained nephrolithiasis/ obstructive nephrolithiasis, reassess. admission if not improved with hydration/ pain control

## 2023-11-30 NOTE — ED PROVIDER NOTE - OBJECTIVE STATEMENT
68M pmhx HTN and kidney stones, who presents today for right flank pain, nausea, vomiting, onset after getting home from 'kidney stone procedure' today at [facility]- reports no stent was placed. He denies chest pain, sob, abd pain or difficulty urinating or fevers/chills. 68M pmhx HTN and kidney stones, who presents today for right flank pain, nausea, vomiting, onset after getting home from 'kidney stone procedure' today at [facility]- reports no stent was placed. He denies chest pain, sob, abd pain or difficulty urinating or fevers/chills. 68M pmhx HTN and kidney stones, who presents today for right flank pain, nausea, vomiting, onset after getting home from 'kidney stone procedure' today at [facility]- reports no stent was placed. He denies chest pain, sob, abd pain or difficulty urinating or fevers/chills. 68M pmhx HTN and kidney stones, who presents today for right flank pain, nausea, vomiting, onset after getting home from 'kidney stone procedure' today at [facility]- states that they 'broke up stones;. reports no stent was placed. He denies chest pain, sob, abd pain or difficulty urinating or fevers/chills. 68M pmhx HTN and kidney stones, who presents today for right flank pain, nausea, vomiting, onset after getting home from 'kidney stone procedure' today at [facility]- states that they 'broke up stones;. reports no stent was placed. He denies chest pain, sob, abd pain or difficulty urinating or fevers/chills. 68M pmhx HTN and kidney stones, who presents today for right flank pain, nausea, vomiting, onset after getting home from 'kidney stone procedure' today at [facility]- states that they 'broke up stones;. reports no stent was placed. He denies chest pain, sob, abd pain or difficulty urinating or fevers/chills. 68M pmhx HTN and kidney stones, who presents today for right flank pain, nausea, vomiting, onset after getting home from 'kidney stone procedure' today at [facility]- states that they 'broke up stones';. reports no stent was placed. He denies chest pain, sob, abd pain or difficulty urinating or fevers/chills. 68M pmhx HTN and kidney stones, who presents today for right flank pain, nausea, vomiting, onset after getting home from 'kidney stone procedure' today at [facility]- states that they 'broke up stones';. reports no stent was placed. He denies chest pain, sob, abd pain or difficulty urinating or fevers/chills. 68M pmhx HTN and kidney stones, who presents today for right flank pain, nausea, vomiting, onset after getting home from 'kidney stone procedure' today at [facility]- states that they 'broke up stones';. reports no stent was placed. He denies chest pain, sob, abd pain or difficulty urinating or fevers/chills.

## 2023-11-30 NOTE — ED ADULT TRIAGE NOTE - CHIEF COMPLAINT QUOTE
BIBEMS c/o lower abd pain, right flank pain and vomiting since left hospital this morning. d/c by [facility] this morning and given pain meds for kidney stone. hx: htn

## 2023-11-30 NOTE — ED ADULT NURSE REASSESSMENT NOTE - NS ED NURSE REASSESS COMMENT FT1
Pt observed vomiting bright yellow bile while assisting patient with urinal. Pt endorsing severe abdominal pain requesting pain medication. Dr. Dhaliwal made aware, awaiting orders.
Pt currently lying comfortably in stretcher displaying no signs of acute distress. Pt no longer vomiting, Pt tolerating EKG well. Pt verbalizes improvement in pain symptoms as well.

## 2023-12-01 DIAGNOSIS — N13.30 UNSPECIFIED HYDRONEPHROSIS: ICD-10-CM

## 2023-12-01 DIAGNOSIS — E78.5 HYPERLIPIDEMIA, UNSPECIFIED: ICD-10-CM

## 2023-12-01 DIAGNOSIS — R52 PAIN, UNSPECIFIED: ICD-10-CM

## 2023-12-01 DIAGNOSIS — I10 ESSENTIAL (PRIMARY) HYPERTENSION: ICD-10-CM

## 2023-12-01 DIAGNOSIS — N20.0 CALCULUS OF KIDNEY: ICD-10-CM

## 2023-12-01 LAB
ALBUMIN SERPL ELPH-MCNC: 2.9 G/DL — LOW (ref 3.3–5)
ALBUMIN SERPL ELPH-MCNC: 2.9 G/DL — LOW (ref 3.3–5)
ALP SERPL-CCNC: 74 U/L — SIGNIFICANT CHANGE UP (ref 40–120)
ALP SERPL-CCNC: 74 U/L — SIGNIFICANT CHANGE UP (ref 40–120)
ALT FLD-CCNC: 28 U/L — SIGNIFICANT CHANGE UP (ref 12–78)
ALT FLD-CCNC: 28 U/L — SIGNIFICANT CHANGE UP (ref 12–78)
ANION GAP SERPL CALC-SCNC: 6 MMOL/L — SIGNIFICANT CHANGE UP (ref 5–17)
ANION GAP SERPL CALC-SCNC: 6 MMOL/L — SIGNIFICANT CHANGE UP (ref 5–17)
APTT BLD: 31.5 SEC — SIGNIFICANT CHANGE UP (ref 24.5–35.6)
APTT BLD: 31.5 SEC — SIGNIFICANT CHANGE UP (ref 24.5–35.6)
AST SERPL-CCNC: 24 U/L — SIGNIFICANT CHANGE UP (ref 15–37)
AST SERPL-CCNC: 24 U/L — SIGNIFICANT CHANGE UP (ref 15–37)
BASOPHILS # BLD AUTO: 0.02 K/UL — SIGNIFICANT CHANGE UP (ref 0–0.2)
BASOPHILS # BLD AUTO: 0.02 K/UL — SIGNIFICANT CHANGE UP (ref 0–0.2)
BASOPHILS NFR BLD AUTO: 0.3 % — SIGNIFICANT CHANGE UP (ref 0–2)
BASOPHILS NFR BLD AUTO: 0.3 % — SIGNIFICANT CHANGE UP (ref 0–2)
BILIRUB SERPL-MCNC: 0.6 MG/DL — SIGNIFICANT CHANGE UP (ref 0.2–1.2)
BILIRUB SERPL-MCNC: 0.6 MG/DL — SIGNIFICANT CHANGE UP (ref 0.2–1.2)
BLD GP AB SCN SERPL QL: SIGNIFICANT CHANGE UP
BLD GP AB SCN SERPL QL: SIGNIFICANT CHANGE UP
BUN SERPL-MCNC: 20 MG/DL — SIGNIFICANT CHANGE UP (ref 7–23)
BUN SERPL-MCNC: 20 MG/DL — SIGNIFICANT CHANGE UP (ref 7–23)
CALCIUM SERPL-MCNC: 8.6 MG/DL — SIGNIFICANT CHANGE UP (ref 8.5–10.1)
CALCIUM SERPL-MCNC: 8.6 MG/DL — SIGNIFICANT CHANGE UP (ref 8.5–10.1)
CHLORIDE SERPL-SCNC: 111 MMOL/L — HIGH (ref 96–108)
CHLORIDE SERPL-SCNC: 111 MMOL/L — HIGH (ref 96–108)
CO2 SERPL-SCNC: 26 MMOL/L — SIGNIFICANT CHANGE UP (ref 22–31)
CO2 SERPL-SCNC: 26 MMOL/L — SIGNIFICANT CHANGE UP (ref 22–31)
CREAT SERPL-MCNC: 1.2 MG/DL — SIGNIFICANT CHANGE UP (ref 0.5–1.3)
CREAT SERPL-MCNC: 1.2 MG/DL — SIGNIFICANT CHANGE UP (ref 0.5–1.3)
EGFR: 67 ML/MIN/1.73M2 — SIGNIFICANT CHANGE UP
EGFR: 67 ML/MIN/1.73M2 — SIGNIFICANT CHANGE UP
EOSINOPHIL # BLD AUTO: 0.11 K/UL — SIGNIFICANT CHANGE UP (ref 0–0.5)
EOSINOPHIL # BLD AUTO: 0.11 K/UL — SIGNIFICANT CHANGE UP (ref 0–0.5)
EOSINOPHIL NFR BLD AUTO: 1.6 % — SIGNIFICANT CHANGE UP (ref 0–6)
EOSINOPHIL NFR BLD AUTO: 1.6 % — SIGNIFICANT CHANGE UP (ref 0–6)
GLUCOSE SERPL-MCNC: 125 MG/DL — HIGH (ref 70–99)
GLUCOSE SERPL-MCNC: 125 MG/DL — HIGH (ref 70–99)
HCT VFR BLD CALC: 34.6 % — LOW (ref 39–50)
HCT VFR BLD CALC: 34.6 % — LOW (ref 39–50)
HGB BLD-MCNC: 11.6 G/DL — LOW (ref 13–17)
HGB BLD-MCNC: 11.6 G/DL — LOW (ref 13–17)
IMM GRANULOCYTES NFR BLD AUTO: 0.3 % — SIGNIFICANT CHANGE UP (ref 0–0.9)
IMM GRANULOCYTES NFR BLD AUTO: 0.3 % — SIGNIFICANT CHANGE UP (ref 0–0.9)
INR BLD: 1.02 RATIO — SIGNIFICANT CHANGE UP (ref 0.85–1.18)
INR BLD: 1.02 RATIO — SIGNIFICANT CHANGE UP (ref 0.85–1.18)
LYMPHOCYTES # BLD AUTO: 0.94 K/UL — LOW (ref 1–3.3)
LYMPHOCYTES # BLD AUTO: 0.94 K/UL — LOW (ref 1–3.3)
LYMPHOCYTES # BLD AUTO: 13.6 % — SIGNIFICANT CHANGE UP (ref 13–44)
LYMPHOCYTES # BLD AUTO: 13.6 % — SIGNIFICANT CHANGE UP (ref 13–44)
MAGNESIUM SERPL-MCNC: 1.9 MG/DL — SIGNIFICANT CHANGE UP (ref 1.6–2.6)
MAGNESIUM SERPL-MCNC: 1.9 MG/DL — SIGNIFICANT CHANGE UP (ref 1.6–2.6)
MCHC RBC-ENTMCNC: 28.9 PG — SIGNIFICANT CHANGE UP (ref 27–34)
MCHC RBC-ENTMCNC: 28.9 PG — SIGNIFICANT CHANGE UP (ref 27–34)
MCHC RBC-ENTMCNC: 33.5 G/DL — SIGNIFICANT CHANGE UP (ref 32–36)
MCHC RBC-ENTMCNC: 33.5 G/DL — SIGNIFICANT CHANGE UP (ref 32–36)
MCV RBC AUTO: 86.3 FL — SIGNIFICANT CHANGE UP (ref 80–100)
MCV RBC AUTO: 86.3 FL — SIGNIFICANT CHANGE UP (ref 80–100)
MONOCYTES # BLD AUTO: 0.76 K/UL — SIGNIFICANT CHANGE UP (ref 0–0.9)
MONOCYTES # BLD AUTO: 0.76 K/UL — SIGNIFICANT CHANGE UP (ref 0–0.9)
MONOCYTES NFR BLD AUTO: 11 % — SIGNIFICANT CHANGE UP (ref 2–14)
MONOCYTES NFR BLD AUTO: 11 % — SIGNIFICANT CHANGE UP (ref 2–14)
NEUTROPHILS # BLD AUTO: 5.06 K/UL — SIGNIFICANT CHANGE UP (ref 1.8–7.4)
NEUTROPHILS # BLD AUTO: 5.06 K/UL — SIGNIFICANT CHANGE UP (ref 1.8–7.4)
NEUTROPHILS NFR BLD AUTO: 73.2 % — SIGNIFICANT CHANGE UP (ref 43–77)
NEUTROPHILS NFR BLD AUTO: 73.2 % — SIGNIFICANT CHANGE UP (ref 43–77)
NRBC # BLD: 0 /100 WBCS — SIGNIFICANT CHANGE UP (ref 0–0)
NRBC # BLD: 0 /100 WBCS — SIGNIFICANT CHANGE UP (ref 0–0)
PHOSPHATE SERPL-MCNC: 4.2 MG/DL — SIGNIFICANT CHANGE UP (ref 2.5–4.5)
PHOSPHATE SERPL-MCNC: 4.2 MG/DL — SIGNIFICANT CHANGE UP (ref 2.5–4.5)
PLATELET # BLD AUTO: 214 K/UL — SIGNIFICANT CHANGE UP (ref 150–400)
PLATELET # BLD AUTO: 214 K/UL — SIGNIFICANT CHANGE UP (ref 150–400)
POTASSIUM SERPL-MCNC: 3.6 MMOL/L — SIGNIFICANT CHANGE UP (ref 3.5–5.3)
POTASSIUM SERPL-MCNC: 3.6 MMOL/L — SIGNIFICANT CHANGE UP (ref 3.5–5.3)
POTASSIUM SERPL-SCNC: 3.6 MMOL/L — SIGNIFICANT CHANGE UP (ref 3.5–5.3)
POTASSIUM SERPL-SCNC: 3.6 MMOL/L — SIGNIFICANT CHANGE UP (ref 3.5–5.3)
PROT SERPL-MCNC: 6.5 GM/DL — SIGNIFICANT CHANGE UP (ref 6–8.3)
PROT SERPL-MCNC: 6.5 GM/DL — SIGNIFICANT CHANGE UP (ref 6–8.3)
PROTHROM AB SERPL-ACNC: 12.2 SEC — SIGNIFICANT CHANGE UP (ref 9.5–13)
PROTHROM AB SERPL-ACNC: 12.2 SEC — SIGNIFICANT CHANGE UP (ref 9.5–13)
RBC # BLD: 4.01 M/UL — LOW (ref 4.2–5.8)
RBC # BLD: 4.01 M/UL — LOW (ref 4.2–5.8)
RBC # FLD: 13.1 % — SIGNIFICANT CHANGE UP (ref 10.3–14.5)
RBC # FLD: 13.1 % — SIGNIFICANT CHANGE UP (ref 10.3–14.5)
SODIUM SERPL-SCNC: 143 MMOL/L — SIGNIFICANT CHANGE UP (ref 135–145)
SODIUM SERPL-SCNC: 143 MMOL/L — SIGNIFICANT CHANGE UP (ref 135–145)
WBC # BLD: 6.91 K/UL — SIGNIFICANT CHANGE UP (ref 3.8–10.5)
WBC # BLD: 6.91 K/UL — SIGNIFICANT CHANGE UP (ref 3.8–10.5)
WBC # FLD AUTO: 6.91 K/UL — SIGNIFICANT CHANGE UP (ref 3.8–10.5)
WBC # FLD AUTO: 6.91 K/UL — SIGNIFICANT CHANGE UP (ref 3.8–10.5)

## 2023-12-01 PROCEDURE — 99222 1ST HOSP IP/OBS MODERATE 55: CPT

## 2023-12-01 RX ORDER — AMLODIPINE BESYLATE 2.5 MG/1
10 TABLET ORAL DAILY
Refills: 0 | Status: DISCONTINUED | OUTPATIENT
Start: 2023-12-01 | End: 2023-12-04

## 2023-12-01 RX ORDER — INFLUENZA VIRUS VACCINE 15; 15; 15; 15 UG/.5ML; UG/.5ML; UG/.5ML; UG/.5ML
0.7 SUSPENSION INTRAMUSCULAR ONCE
Refills: 0 | Status: DISCONTINUED | OUTPATIENT
Start: 2023-12-01 | End: 2023-12-05

## 2023-12-01 RX ORDER — ONDANSETRON 8 MG/1
4 TABLET, FILM COATED ORAL EVERY 6 HOURS
Refills: 0 | Status: DISCONTINUED | OUTPATIENT
Start: 2023-12-01 | End: 2023-12-04

## 2023-12-01 RX ORDER — HYDROMORPHONE HYDROCHLORIDE 2 MG/ML
0.5 INJECTION INTRAMUSCULAR; INTRAVENOUS; SUBCUTANEOUS EVERY 4 HOURS
Refills: 0 | Status: DISCONTINUED | OUTPATIENT
Start: 2023-12-01 | End: 2023-12-04

## 2023-12-01 RX ORDER — VALSARTAN 80 MG/1
320 TABLET ORAL DAILY
Refills: 0 | Status: DISCONTINUED | OUTPATIENT
Start: 2023-12-01 | End: 2023-12-04

## 2023-12-01 RX ORDER — SODIUM CHLORIDE 9 MG/ML
1000 INJECTION, SOLUTION INTRAVENOUS
Refills: 0 | Status: DISCONTINUED | OUTPATIENT
Start: 2023-12-01 | End: 2023-12-04

## 2023-12-01 RX ORDER — LANOLIN ALCOHOL/MO/W.PET/CERES
3 CREAM (GRAM) TOPICAL AT BEDTIME
Refills: 0 | Status: DISCONTINUED | OUTPATIENT
Start: 2023-12-01 | End: 2023-12-04

## 2023-12-01 RX ORDER — TAMSULOSIN HYDROCHLORIDE 0.4 MG/1
0.4 CAPSULE ORAL AT BEDTIME
Refills: 0 | Status: DISCONTINUED | OUTPATIENT
Start: 2023-12-01 | End: 2023-12-04

## 2023-12-01 RX ORDER — KETOROLAC TROMETHAMINE 30 MG/ML
15 SYRINGE (ML) INJECTION EVERY 6 HOURS
Refills: 0 | Status: DISCONTINUED | OUTPATIENT
Start: 2023-12-01 | End: 2023-12-03

## 2023-12-01 RX ORDER — ATORVASTATIN CALCIUM 80 MG/1
40 TABLET, FILM COATED ORAL AT BEDTIME
Refills: 0 | Status: DISCONTINUED | OUTPATIENT
Start: 2023-12-01 | End: 2023-12-04

## 2023-12-01 RX ORDER — ACETAMINOPHEN 500 MG
650 TABLET ORAL EVERY 6 HOURS
Refills: 0 | Status: DISCONTINUED | OUTPATIENT
Start: 2023-12-01 | End: 2023-12-04

## 2023-12-01 RX ORDER — ACETAMINOPHEN 500 MG
1000 TABLET ORAL ONCE
Refills: 0 | Status: COMPLETED | OUTPATIENT
Start: 2023-12-01 | End: 2023-12-04

## 2023-12-01 RX ADMIN — Medication 15 MILLIGRAM(S): at 06:32

## 2023-12-01 RX ADMIN — ATORVASTATIN CALCIUM 40 MILLIGRAM(S): 80 TABLET, FILM COATED ORAL at 21:20

## 2023-12-01 RX ADMIN — SODIUM CHLORIDE 120 MILLILITER(S): 9 INJECTION, SOLUTION INTRAVENOUS at 17:57

## 2023-12-01 RX ADMIN — AMLODIPINE BESYLATE 10 MILLIGRAM(S): 2.5 TABLET ORAL at 11:47

## 2023-12-01 RX ADMIN — TAMSULOSIN HYDROCHLORIDE 0.4 MILLIGRAM(S): 0.4 CAPSULE ORAL at 21:20

## 2023-12-01 RX ADMIN — Medication 15 MILLIGRAM(S): at 17:57

## 2023-12-01 RX ADMIN — SODIUM CHLORIDE 120 MILLILITER(S): 9 INJECTION, SOLUTION INTRAVENOUS at 06:27

## 2023-12-01 RX ADMIN — HYDROMORPHONE HYDROCHLORIDE 0.5 MILLIGRAM(S): 2 INJECTION INTRAMUSCULAR; INTRAVENOUS; SUBCUTANEOUS at 20:23

## 2023-12-01 RX ADMIN — Medication 15 MILLIGRAM(S): at 18:24

## 2023-12-01 RX ADMIN — HYDROMORPHONE HYDROCHLORIDE 0.5 MILLIGRAM(S): 2 INJECTION INTRAMUSCULAR; INTRAVENOUS; SUBCUTANEOUS at 20:53

## 2023-12-01 RX ADMIN — VALSARTAN 320 MILLIGRAM(S): 80 TABLET ORAL at 11:47

## 2023-12-01 NOTE — CONSULT NOTE ADULT - ASSESSMENT
68M s/p what sounds like lithotripsy at [facility] today  Pain not well controlled  CT shows 3 stones in the right ureter, mil hydro  admit for pain control  strain urine  tamsulosin  no surgical intervention at present 68M s/p what sounds like lithotripsy at [facility] today  Pain not well controlled  CT shows 3 stones in the right ureter, mil hydro  admit for pain control  strain urine  tamsulosin  no surgical intervention at present 68M s/p what sounds like lithotripsy at [facility] today  Pain not well controlled  CT shows 3 stones in the right ureter, mil hydro  admit for pain control  strain urine  tamsulosin  no surgical intervention at present

## 2023-12-01 NOTE — CONSULT NOTE ADULT - NS ATTEND AMEND GEN_ALL_CORE FT
Explained his findings in detail and that his Cr is rising.  He is not eager for surgery with stent for his mild hydro, but offered iv hydration, flomax and observation.  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    He seemed to understand. Family/Self

## 2023-12-01 NOTE — H&P ADULT - HISTORY OF PRESENT ILLNESS
68 years old male with h/o HTN, HLD, nephrolithiasis present to ED with complain of severe right flank pain radiate to right groin, associated with nausea and vomiting. Patient had ? lithotripsy procedure at [facility] yesterday. No fever or chills.   Hemodynamically stable, afebrile, sat well at [facility]. No leukocytosis, plt 214, K 3.6, Cr 1.2. CT abd/pelvis with 4mm calculus in the proximal right ureter. 3 mm calculus in the distal right ureter. 4 mm calculus at the right UVJ. Associated mild right hydroureteronephrosis. Small nonobstructive calculi in the right kidney and right renal pelvis measuring up to 9 mm.    SH: social drinking  FH: prostate problem

## 2023-12-01 NOTE — H&P ADULT - PROBLEM SELECTOR PLAN 3
present with severe right flank pain. ? lithotripsy 11/30/23  CT abd/pelvis  ( I personally review) with 4mm calculus in the proximal right ureter. 3 mm calculus in the distal right ureter. 4 mm calculus at the right UVJ. Associated mild right hydroureteronephrosis. Small nonobstructive calculi in the right kidney and right renal pelvis measuring up to 9 mm  Pain control--> IV dilaudid for severe pain  IV fluid, flomax  urology consulted

## 2023-12-01 NOTE — H&P ADULT - ASSESSMENT
68 years old male with h/o HTN, HLD, nephrolithiasis present to ED with complain of severe right flank pain radiate to right groin, associated with nausea and vomiting. Patient had ? lithotripsy procedure at [facility] yesterday. No fever or chills.   Hemodynamically stable, afebrile, sat well at RA. No leukocytosis, plt 214, K 3.6, Cr 1.2. CT abd/pelvis with 4mm calculus in the proximal right ureter. 3 mm calculus in the distal right ureter. 4 mm calculus at the right UVJ. Associated mild right hydroureteronephrosis. Small nonobstructive calculi in the right kidney and right renal pelvis measuring up to 9 mm.

## 2023-12-01 NOTE — H&P ADULT - NSHPPHYSICALEXAM_GEN_ALL_CORE
CONSTITUTIONAL: alert and cooperative, no acute distress.  EYES: PERRL, no scleral icterus  ENT: Mucosa moist, tongue normal  NECK: Neck supple, trachea midline, non-tender  CARDIAC: Normal S1 and S2. Regular rate and rhythms. No Pedal edema. Peripheral pulses intact  LUNGS: Equal air entry both lungs. No rales, rhonchi, wheezing. Normal respiratory effort.   ABDOMEN: Soft, nondistended, nontender. No guarding or rebound tenderness. No hepatomegaly or splenomegaly. Bowel sound normal.  MUSCULOSKELETAL: Normocephalic, atraumatic. No significant deformity or joint abnormality  NEUROLOGICAL: No gross motor or sensory deficits  SKIN: no lesions or eruptions. Normal turgor  PSYCHIATRIC: A&O x 3, appropriate mood and affect

## 2023-12-01 NOTE — PATIENT PROFILE ADULT - NSPROPASSIVESMOKEEXPOSURE_GEN_A_NUR
February 22, 2017        Michael Shook MD  [address]  [facility] [address]             [facility]  [address]  [facility] [address]  Phone: [phone]   Patient: Brionna Jones   MR Number: [id number]   YOB: 1967   Date of Visit: 2/22/2017       Dear Dr. Shook:    Thank you for referring Brionna Jones to me for evaluation. Below are the relevant portions of my assessment and plan of care.       Encounter Diagnoses   Name Primary?    Chondral lesion Yes    Primary osteoarthritis of right knee     Internal derangement of right knee            1.  IKDC, SF-12 and KOOS was filled out today in clinic.     RTC in 4 weeks with Mid-level provider for preop. Patient will not fill out IKDC, SF-12 and KOOS on return.    2. We reviewed with Brionna today, the pathology and natural history of her diagnosis. We have discussed a variety of treatment options including medications, physical therapy and other alternative treatments. I also explained the indications, risks and benefits of surgery. After discussion, Brionna decided to proceed with surgery. The decision was made to go forward with    1. Right knee ConforMIS iTotal    The details of the surgical procedure were explained, including the location of probable incisions and a description of likely hardware and/or grafts to be used.  The patient understands the likely convalescence after surgery.  Also, we have thoroughly discussed the risks, benefits and alternatives to surgery, including, but not limited to, the risk of infection, joint stiffness, blood clot (including DVT and/or pulmonary embolus), neurologic and vascular injury.  It was explained that, if tissue has been repaired or reconstructed, there is a chance of failure, which may require further management.    All of the patient's questions were answered and informed consent was obtained. The patient will contact us if they have any questions or concerns  in the interim.    3. Rehab-[facility] [place]     4. Medical clearance: Cardiologist-Delvin Castro, PMD-Dr. Houston    5. Referral to pain clinic-Dr. Andrew Peterson for leg pain      If you have questions, please do not hesitate to call me. I look forward to following Brionna along with you.    Sincerely,      Matt Smith MD           CC  No Recipients        No

## 2023-12-01 NOTE — H&P ADULT - PROBLEM SELECTOR PLAN 1
present with severe right flank pain. ? lithotripsy 11/30/23  CT abd/pelvis  ( I personally review) with 4mm calculus in the proximal right ureter. 3 mm calculus in the distal right ureter. 4 mm calculus at the right UVJ. Associated mild right hydroureteronephrosis. Small nonobstructive calculi in the right kidney and right renal pelvis measuring up to 9 mm  Pain control--> IV dilaudid for severe pain  IV fluid, flomax  closely monitor renal function  urology consulted

## 2023-12-01 NOTE — CONSULT NOTE ADULT - SUBJECTIVE AND OBJECTIVE BOX
Chief Complaint:  Patient is a 68y old  Male who presents with a chief complaint of flank pain    HPI:  68M with pmh HTN and kidney stones, c/o right flank pain, nausea, vomiting, onset after getting home from 'kidney stone procedure' today at [facility]- states that they 'broke up stones;. reports no stent was placed. Pt denies dysuria or hematuria, denies chest pain, sob, abd pain or fevers/chills.        PMH/PSH:PAST MEDICAL & SURGICAL HISTORY:  Hypertension      Prostate cancer      Alcohol abuse, daily use      S/P hip replacement, right          Allergies:  morphine (Nausea)      Medications:      REVIEW OF SYSTEMS:  All other review of systems is negative unless indicated above.    Relevant Family History:   FAMILY HISTORY:  FH: prostate cancer (Father, Sibling)        Relevant Social History:  Denies ETOH or tobacco history    Physical Exam:    Vital Signs:  Vital Signs Last 24 Hrs  T(C): 36.6 (01 Dec 2023 04:28), Max: 36.9 (2023 20:05)  T(F): 97.9 (01 Dec 2023 04:28), Max: 98.5 (2023 20:05)  HR: 85 (01 Dec 2023 04:28) (66 - 85)  BP: 144/72 (01 Dec 2023 04:28) (134/76 - 149/92)  BP(mean): --  RR: 18 (01 Dec 2023 04:28) (18 - 18)  SpO2: 95% (01 Dec 2023 04:28) (95% - 97%)    Parameters below as of 01 Dec 2023 04:28  Patient On (Oxygen Delivery Method): room air      Daily Height in cm: 195.58 (2023 16:53)    Daily     Constitutional: uncomfortable appearing  Respiratory: normal work of breathing  Cardiac: RRR  Gastrointestinal: soft, NT/ND; no rebound or guarding;   Extremities: , no clubbing or cyanosis; no peripheral edema  Musculoskeletal:  +Right CVA tenderness  Skin: warm, dry   Neurologic: AAOx3;     Laboratory:                          12.8   8.52  )-----------( 241      ( 2023 17:51 )             38.9     11-30    142  |  112<H>  |  18  ----------------------------<  113<H>  4.4   |  27  |  0.87    Ca    9.3      2023 17:51    TPro  7.4  /  Alb  3.3  /  TBili  0.6  /  DBili  x   /  AST  33  /  ALT  35  /  AlkPhos  82  11-30    LIVER FUNCTIONS - ( 2023 17:51 )  Alb: 3.3 g/dL / Pro: 7.4 gm/dL / ALK PHOS: 82 U/L / ALT: 35 U/L / AST: 33 U/L / GGT: x             Urinalysis Basic - ( 2023 18:23 )    Color: Yellow / Appearance: Cloudy / S.020 / pH: x  Gluc: x / Ketone: Negative mg/dL  / Bili: Negative / Urobili: 1.0 mg/dL   Blood: x / Protein: 30 mg/dL / Nitrite: Negative   Leuk Esterase: Trace / RBC: >50 /HPF / WBC 3-5 /HPF   Sq Epi: x / Non Sq Epi: x / Bacteria: Few /HPF        Imaging:    < from: CT Abdomen and Pelvis No Cont (23 @ 19:28) >    IMPRESSION:  4 mm calculus in the proximal right ureter. 3 mm calculusin the distal   right ureter. 4 mm calculus at the right UVJ. Associated mild right   hydroureteronephrosis. Small nonobstructive calculi in the right kidney   and right renal pelvis measuring up to 9 mm.      < end of copied text >     Chief Complaint:  Patient is a 68y old  Male who presents with a chief complaint of flank pain    HPI:  68M with pmh HTN and kidney stones, c/o right flank pain, nausea, vomiting, onset after getting home from 'kidney stone procedure' today at [facility]- states that they 'broke up stones;. reports no stent was placed. Pt denies dysuria or hematuria, denies chest pain, sob, abd pain or fevers/chills.        PMH/PSH:PAST MEDICAL & SURGICAL HISTORY:  Hypertension      Prostate cancer      Alcohol abuse, daily use      S/P hip replacement, right          Allergies:  morphine (Nausea)      Medications:      REVIEW OF SYSTEMS:  All other review of systems is negative unless indicated above.    Relevant Family History:   FAMILY HISTORY:  FH: prostate cancer (Father, Sibling)        Relevant Social History:  Denies ETOH or tobacco history    Physical Exam:    Vital Signs:  Vital Signs Last 24 Hrs  T(C): 36.6 (01 Dec 2023 04:28), Max: 36.9 (2023 20:05)  T(F): 97.9 (01 Dec 2023 04:28), Max: 98.5 (2023 20:05)  HR: 85 (01 Dec 2023 04:28) (66 - 85)  BP: 144/72 (01 Dec 2023 04:28) (134/76 - 149/92)  BP(mean): --  RR: 18 (01 Dec 2023 04:28) (18 - 18)  SpO2: 95% (01 Dec 2023 04:28) (95% - 97%)    Parameters below as of 01 Dec 2023 04:28  Patient On (Oxygen Delivery Method): room air      Daily Height in cm: 195.58 (2023 16:53)    Daily     Constitutional: uncomfortable appearing  Respiratory: normal work of breathing  Cardiac: RRR  Gastrointestinal: soft, NT/ND; no rebound or guarding;   Extremities: , no clubbing or cyanosis; no peripheral edema  Musculoskeletal:  +Right CVA tenderness  Skin: warm, dry   Neurologic: AAOx3;     Laboratory:                          12.8   8.52  )-----------( 241      ( 2023 17:51 )             38.9     11-30    142  |  112<H>  |  18  ----------------------------<  113<H>  4.4   |  27  |  0.87    Ca    9.3      2023 17:51    TPro  7.4  /  Alb  3.3  /  TBili  0.6  /  DBili  x   /  AST  33  /  ALT  35  /  AlkPhos  82  11-30    LIVER FUNCTIONS - ( 2023 17:51 )  Alb: 3.3 g/dL / Pro: 7.4 gm/dL / ALK PHOS: 82 U/L / ALT: 35 U/L / AST: 33 U/L / GGT: x             Urinalysis Basic - ( 2023 18:23 )    Color: Yellow / Appearance: Cloudy / S.020 / pH: x  Gluc: x / Ketone: Negative mg/dL  / Bili: Negative / Urobili: 1.0 mg/dL   Blood: x / Protein: 30 mg/dL / Nitrite: Negative   Leuk Esterase: Trace / RBC: >50 /HPF / WBC 3-5 /HPF   Sq Epi: x / Non Sq Epi: x / Bacteria: Few /HPF        Imaging:    < from: CT Abdomen and Pelvis No Cont (23 @ 19:28) >    IMPRESSION:  4 mm calculus in the proximal right ureter. 3 mm calculusin the distal   right ureter. 4 mm calculus at the right UVJ. Associated mild right   hydroureteronephrosis. Small nonobstructive calculi in the right kidney   and right renal pelvis measuring up to 9 mm.      < end of copied text >     Chief Complaint:  Patient is a 68y old  Male who presents with a chief complaint of flank pain    HPI:  68M with pmh HTN and kidney stones, c/o right flank pain, nausea, vomiting, onset after getting home from 'kidney stone procedure' today at [facility]- states that they 'broke up stones;. reports no stent was placed. Pt denies dysuria or hematuria, denies chest pain, sob, abd pain or fevers/chills.        PMH/PSH:PAST MEDICAL & SURGICAL HISTORY:  Hypertension      Prostate cancer      Alcohol abuse, daily use      S/P hip replacement, right          Allergies:  morphine (Nausea)      Medications:      REVIEW OF SYSTEMS:  All other review of systems is negative unless indicated above.    Relevant Family History:   FAMILY HISTORY:  FH: prostate cancer (Father, Sibling)        Relevant Social History:  Denies ETOH or tobacco history    Physical Exam:    Vital Signs:  Vital Signs Last 24 Hrs  T(C): 36.6 (01 Dec 2023 04:28), Max: 36.9 (2023 20:05)  T(F): 97.9 (01 Dec 2023 04:28), Max: 98.5 (2023 20:05)  HR: 85 (01 Dec 2023 04:28) (66 - 85)  BP: 144/72 (01 Dec 2023 04:28) (134/76 - 149/92)  BP(mean): --  RR: 18 (01 Dec 2023 04:28) (18 - 18)  SpO2: 95% (01 Dec 2023 04:28) (95% - 97%)    Parameters below as of 01 Dec 2023 04:28  Patient On (Oxygen Delivery Method): room air      Daily Height in cm: 195.58 (2023 16:53)    Daily     Constitutional: uncomfortable appearing  Respiratory: normal work of breathing  Cardiac: RRR  Gastrointestinal: soft, NT/ND; no rebound or guarding;   Extremities: , no clubbing or cyanosis; no peripheral edema  Musculoskeletal:  +Right CVA tenderness  Skin: warm, dry   Neurologic: AAOx3;     Laboratory:                          12.8   8.52  )-----------( 241      ( 2023 17:51 )             38.9     11-30    142  |  112<H>  |  18  ----------------------------<  113<H>  4.4   |  27  |  0.87    Ca    9.3      2023 17:51    TPro  7.4  /  Alb  3.3  /  TBili  0.6  /  DBili  x   /  AST  33  /  ALT  35  /  AlkPhos  82  11-30    LIVER FUNCTIONS - ( 2023 17:51 )  Alb: 3.3 g/dL / Pro: 7.4 gm/dL / ALK PHOS: 82 U/L / ALT: 35 U/L / AST: 33 U/L / GGT: x             Urinalysis Basic - ( 2023 18:23 )    Color: Yellow / Appearance: Cloudy / S.020 / pH: x  Gluc: x / Ketone: Negative mg/dL  / Bili: Negative / Urobili: 1.0 mg/dL   Blood: x / Protein: 30 mg/dL / Nitrite: Negative   Leuk Esterase: Trace / RBC: >50 /HPF / WBC 3-5 /HPF   Sq Epi: x / Non Sq Epi: x / Bacteria: Few /HPF        Imaging:    < from: CT Abdomen and Pelvis No Cont (23 @ 19:28) >    IMPRESSION:  4 mm calculus in the proximal right ureter. 3 mm calculusin the distal   right ureter. 4 mm calculus at the right UVJ. Associated mild right   hydroureteronephrosis. Small nonobstructive calculi in the right kidney   and right renal pelvis measuring up to 9 mm.      < end of copied text >

## 2023-12-02 LAB
ALBUMIN SERPL ELPH-MCNC: 2.7 G/DL — LOW (ref 3.3–5)
ALBUMIN SERPL ELPH-MCNC: 2.7 G/DL — LOW (ref 3.3–5)
ALP SERPL-CCNC: 64 U/L — SIGNIFICANT CHANGE UP (ref 40–120)
ALP SERPL-CCNC: 64 U/L — SIGNIFICANT CHANGE UP (ref 40–120)
ALT FLD-CCNC: 23 U/L — SIGNIFICANT CHANGE UP (ref 12–78)
ALT FLD-CCNC: 23 U/L — SIGNIFICANT CHANGE UP (ref 12–78)
ANION GAP SERPL CALC-SCNC: 6 MMOL/L — SIGNIFICANT CHANGE UP (ref 5–17)
ANION GAP SERPL CALC-SCNC: 6 MMOL/L — SIGNIFICANT CHANGE UP (ref 5–17)
AST SERPL-CCNC: 19 U/L — SIGNIFICANT CHANGE UP (ref 15–37)
AST SERPL-CCNC: 19 U/L — SIGNIFICANT CHANGE UP (ref 15–37)
BILIRUB SERPL-MCNC: 0.2 MG/DL — SIGNIFICANT CHANGE UP (ref 0.2–1.2)
BILIRUB SERPL-MCNC: 0.2 MG/DL — SIGNIFICANT CHANGE UP (ref 0.2–1.2)
BUN SERPL-MCNC: 18 MG/DL — SIGNIFICANT CHANGE UP (ref 7–23)
BUN SERPL-MCNC: 18 MG/DL — SIGNIFICANT CHANGE UP (ref 7–23)
CALCIUM SERPL-MCNC: 8.3 MG/DL — LOW (ref 8.5–10.1)
CALCIUM SERPL-MCNC: 8.3 MG/DL — LOW (ref 8.5–10.1)
CHLORIDE SERPL-SCNC: 110 MMOL/L — HIGH (ref 96–108)
CHLORIDE SERPL-SCNC: 110 MMOL/L — HIGH (ref 96–108)
CO2 SERPL-SCNC: 24 MMOL/L — SIGNIFICANT CHANGE UP (ref 22–31)
CO2 SERPL-SCNC: 24 MMOL/L — SIGNIFICANT CHANGE UP (ref 22–31)
CREAT SERPL-MCNC: 1.32 MG/DL — HIGH (ref 0.5–1.3)
CREAT SERPL-MCNC: 1.32 MG/DL — HIGH (ref 0.5–1.3)
CULTURE RESULTS: NO GROWTH — SIGNIFICANT CHANGE UP
CULTURE RESULTS: NO GROWTH — SIGNIFICANT CHANGE UP
EGFR: 59 ML/MIN/1.73M2 — LOW
EGFR: 59 ML/MIN/1.73M2 — LOW
GLUCOSE SERPL-MCNC: 118 MG/DL — HIGH (ref 70–99)
GLUCOSE SERPL-MCNC: 118 MG/DL — HIGH (ref 70–99)
HCT VFR BLD CALC: 33.5 % — LOW (ref 39–50)
HCT VFR BLD CALC: 33.5 % — LOW (ref 39–50)
HCV AB S/CO SERPL IA: 0.15 S/CO — SIGNIFICANT CHANGE UP (ref 0–0.99)
HCV AB S/CO SERPL IA: 0.15 S/CO — SIGNIFICANT CHANGE UP (ref 0–0.99)
HCV AB SERPL-IMP: SIGNIFICANT CHANGE UP
HCV AB SERPL-IMP: SIGNIFICANT CHANGE UP
HGB BLD-MCNC: 11.2 G/DL — LOW (ref 13–17)
HGB BLD-MCNC: 11.2 G/DL — LOW (ref 13–17)
MAGNESIUM SERPL-MCNC: 1.8 MG/DL — SIGNIFICANT CHANGE UP (ref 1.6–2.6)
MAGNESIUM SERPL-MCNC: 1.8 MG/DL — SIGNIFICANT CHANGE UP (ref 1.6–2.6)
MCHC RBC-ENTMCNC: 28.8 PG — SIGNIFICANT CHANGE UP (ref 27–34)
MCHC RBC-ENTMCNC: 28.8 PG — SIGNIFICANT CHANGE UP (ref 27–34)
MCHC RBC-ENTMCNC: 33.4 G/DL — SIGNIFICANT CHANGE UP (ref 32–36)
MCHC RBC-ENTMCNC: 33.4 G/DL — SIGNIFICANT CHANGE UP (ref 32–36)
MCV RBC AUTO: 86.1 FL — SIGNIFICANT CHANGE UP (ref 80–100)
MCV RBC AUTO: 86.1 FL — SIGNIFICANT CHANGE UP (ref 80–100)
NRBC # BLD: 0 /100 WBCS — SIGNIFICANT CHANGE UP (ref 0–0)
NRBC # BLD: 0 /100 WBCS — SIGNIFICANT CHANGE UP (ref 0–0)
PHOSPHATE SERPL-MCNC: 3.9 MG/DL — SIGNIFICANT CHANGE UP (ref 2.5–4.5)
PHOSPHATE SERPL-MCNC: 3.9 MG/DL — SIGNIFICANT CHANGE UP (ref 2.5–4.5)
PLATELET # BLD AUTO: 194 K/UL — SIGNIFICANT CHANGE UP (ref 150–400)
PLATELET # BLD AUTO: 194 K/UL — SIGNIFICANT CHANGE UP (ref 150–400)
POTASSIUM SERPL-MCNC: 3.5 MMOL/L — SIGNIFICANT CHANGE UP (ref 3.5–5.3)
POTASSIUM SERPL-MCNC: 3.5 MMOL/L — SIGNIFICANT CHANGE UP (ref 3.5–5.3)
POTASSIUM SERPL-SCNC: 3.5 MMOL/L — SIGNIFICANT CHANGE UP (ref 3.5–5.3)
POTASSIUM SERPL-SCNC: 3.5 MMOL/L — SIGNIFICANT CHANGE UP (ref 3.5–5.3)
PROT SERPL-MCNC: 6.1 GM/DL — SIGNIFICANT CHANGE UP (ref 6–8.3)
PROT SERPL-MCNC: 6.1 GM/DL — SIGNIFICANT CHANGE UP (ref 6–8.3)
RBC # BLD: 3.89 M/UL — LOW (ref 4.2–5.8)
RBC # BLD: 3.89 M/UL — LOW (ref 4.2–5.8)
RBC # FLD: 13 % — SIGNIFICANT CHANGE UP (ref 10.3–14.5)
RBC # FLD: 13 % — SIGNIFICANT CHANGE UP (ref 10.3–14.5)
SODIUM SERPL-SCNC: 140 MMOL/L — SIGNIFICANT CHANGE UP (ref 135–145)
SODIUM SERPL-SCNC: 140 MMOL/L — SIGNIFICANT CHANGE UP (ref 135–145)
SPECIMEN SOURCE: SIGNIFICANT CHANGE UP
SPECIMEN SOURCE: SIGNIFICANT CHANGE UP
WBC # BLD: 6.13 K/UL — SIGNIFICANT CHANGE UP (ref 3.8–10.5)
WBC # BLD: 6.13 K/UL — SIGNIFICANT CHANGE UP (ref 3.8–10.5)
WBC # FLD AUTO: 6.13 K/UL — SIGNIFICANT CHANGE UP (ref 3.8–10.5)
WBC # FLD AUTO: 6.13 K/UL — SIGNIFICANT CHANGE UP (ref 3.8–10.5)

## 2023-12-02 PROCEDURE — ZZZZZ: CPT

## 2023-12-02 PROCEDURE — 99232 SBSQ HOSP IP/OBS MODERATE 35: CPT

## 2023-12-02 PROCEDURE — 99233 SBSQ HOSP IP/OBS HIGH 50: CPT

## 2023-12-02 RX ADMIN — ONDANSETRON 4 MILLIGRAM(S): 8 TABLET, FILM COATED ORAL at 03:36

## 2023-12-02 RX ADMIN — HYDROMORPHONE HYDROCHLORIDE 0.5 MILLIGRAM(S): 2 INJECTION INTRAMUSCULAR; INTRAVENOUS; SUBCUTANEOUS at 22:28

## 2023-12-02 RX ADMIN — AMLODIPINE BESYLATE 10 MILLIGRAM(S): 2.5 TABLET ORAL at 05:19

## 2023-12-02 RX ADMIN — HYDROMORPHONE HYDROCHLORIDE 0.5 MILLIGRAM(S): 2 INJECTION INTRAMUSCULAR; INTRAVENOUS; SUBCUTANEOUS at 16:03

## 2023-12-02 RX ADMIN — TAMSULOSIN HYDROCHLORIDE 0.4 MILLIGRAM(S): 0.4 CAPSULE ORAL at 21:25

## 2023-12-02 RX ADMIN — HYDROMORPHONE HYDROCHLORIDE 0.5 MILLIGRAM(S): 2 INJECTION INTRAMUSCULAR; INTRAVENOUS; SUBCUTANEOUS at 21:28

## 2023-12-02 RX ADMIN — HYDROMORPHONE HYDROCHLORIDE 0.5 MILLIGRAM(S): 2 INJECTION INTRAMUSCULAR; INTRAVENOUS; SUBCUTANEOUS at 11:42

## 2023-12-02 RX ADMIN — Medication 15 MILLIGRAM(S): at 11:44

## 2023-12-02 RX ADMIN — HYDROMORPHONE HYDROCHLORIDE 0.5 MILLIGRAM(S): 2 INJECTION INTRAMUSCULAR; INTRAVENOUS; SUBCUTANEOUS at 10:42

## 2023-12-02 RX ADMIN — SODIUM CHLORIDE 120 MILLILITER(S): 9 INJECTION, SOLUTION INTRAVENOUS at 23:17

## 2023-12-02 RX ADMIN — HYDROMORPHONE HYDROCHLORIDE 0.5 MILLIGRAM(S): 2 INJECTION INTRAMUSCULAR; INTRAVENOUS; SUBCUTANEOUS at 03:36

## 2023-12-02 RX ADMIN — HYDROMORPHONE HYDROCHLORIDE 0.5 MILLIGRAM(S): 2 INJECTION INTRAMUSCULAR; INTRAVENOUS; SUBCUTANEOUS at 15:03

## 2023-12-02 RX ADMIN — VALSARTAN 320 MILLIGRAM(S): 80 TABLET ORAL at 05:18

## 2023-12-02 RX ADMIN — ATORVASTATIN CALCIUM 40 MILLIGRAM(S): 80 TABLET, FILM COATED ORAL at 21:25

## 2023-12-02 NOTE — PROGRESS NOTE ADULT - SUBJECTIVE AND OBJECTIVE BOX
SURGERY PROGRESS HPI:  Pt seen and examined at bedside.  Pt reports continued nausea.  Says he passed one stone.  Denies fever or chills      Vital Signs Last 24 Hrs  T(C): 36.7 (02 Dec 2023 17:49), Max: 37.3 (02 Dec 2023 05:04)  T(F): 98 (02 Dec 2023 17:49), Max: 99.1 (02 Dec 2023 05:04)  HR: 74 (02 Dec 2023 17:49) (67 - 74)  BP: 157/78 (02 Dec 2023 17:49) (133/77 - 157/78)  BP(mean): --  RR: 18 (02 Dec 2023 17:49) (17 - 18)  SpO2: 96% (02 Dec 2023 17:49) (92% - 97%)    Parameters below as of 02 Dec 2023 17:49  Patient On (Oxygen Delivery Method): room air          PHYSICAL EXAM:    GENERAL: NAD  HEAD:  Atraumatic, Normocephalic  CHEST/LUNG: Normal work of breathing   HEART: RRR  ABDOMEN: non distended, soft, non tender, no guarding  EXTREMITIES:  calf soft, non tender b/l    I&O's Detail    01 Dec 2023 07:01  -  02 Dec 2023 07:00  --------------------------------------------------------  IN:  Total IN: 0 mL    OUT:    Voided (mL): 300 mL  Total OUT: 300 mL    Total NET: -300 mL      02 Dec 2023 07:01  -  02 Dec 2023 18:10  --------------------------------------------------------  IN:    Oral Fluid: 960 mL  Total IN: 960 mL    OUT:  Total OUT: 0 mL    Total NET: 960 mL          LABS:                        11.2   6.13  )-----------( 194      ( 02 Dec 2023 07:50 )             33.5     12-02    140  |  110<H>  |  18  ----------------------------<  118<H>  3.5   |  24  |  1.32<H>    Ca    8.3<L>      02 Dec 2023 07:50  Phos  3.9     12-02  Mg     1.8     12-02    TPro  6.1  /  Alb  2.7<L>  /  TBili  0.2  /  DBili  x   /  AST  19  /  ALT  23  /  AlkPhos  64  12-02    PT/INR - ( 01 Dec 2023 08:05 )   PT: 12.2 sec;   INR: 1.02 ratio         PTT - ( 01 Dec 2023 08:05 )  PTT:31.5 sec  Urinalysis Basic - ( 02 Dec 2023 07:50 )    Color: x / Appearance: x / SG: x / pH: x  Gluc: 118 mg/dL / Ketone: x  / Bili: x / Urobili: x   Blood: x / Protein: x / Nitrite: x   Leuk Esterase: x / RBC: x / WBC x   Sq Epi: x / Non Sq Epi: x / Bacteria: x      Culture Results:   No growth (11-30 @ 18:23)    type    RADIOLOGY & ADDITIONAL STUDIES:

## 2023-12-02 NOTE — PROGRESS NOTE ADULT - ASSESSMENT
Pt is here with 3 stones in ureter after lithotripsy 2 days ago.  Continued nausea and pain  Passed one stone.    Mild ERNESTO-Cr 1.32    continue medical management for pain, nausea  IVF  strain urine  tamsulosin

## 2023-12-02 NOTE — PROGRESS NOTE ADULT - ASSESSMENT
68 years old male with h/o HTN, HLD, nephrolithiasis present to ED with complain of severe right flank pain radiate to right groin, associated with nausea and vomiting. Patient had ? lithotripsy procedure at [facility] yesterday. No fever or chills.   Hemodynamically stable, afebrile, sat well at [facility]. No leukocytosis, plt 214, K 3.6, Cr 1.2. CT abd/pelvis with 4mm calculus in the proximal right ureter. 3 mm calculus in the distal right ureter. 4 mm calculus at the right UVJ. Associated mild right hydroureteronephrosis. Small nonobstructive calculi in the right kidney and right renal pelvis measuring up to 9 mm.      Problem/Plan - 1:  ·  Severe right flank pain with multiple small right ureteral obstructive stones and associated right hydronephrosis and azotemia. patient s/p lithotripsy on 11/30/23. + hematuria.   cont iv dilaudid and toradol for severe pain and monitor for any oversedation.   cont Aggressive hydration and use strainer to collect stones. Cont flomax   no evidence of any infection at this time. Discussed with urology consult service.   Follow labs in am.     Problem/Plan - 4:  ·  Problem: Benign essential HTN.   · controlled. cont to monitor.     Problem/Plan - 5:  ·  Problem: Hyperlipidemia, unspecified.   ·  Plan: on atorvastatin 20mg hs.    Anemia. normocytic. not blood loss. Trend CBC.     Overweight. diet and exercise.     Full Code  SCDs    Time spent by me managing the patient is 54 mins

## 2023-12-02 NOTE — PROGRESS NOTE ADULT - SUBJECTIVE AND OBJECTIVE BOX
CHIEF COMPLAINT: FU of severe right flank pain  still reporting severe pain  nursing aid found one small stone in the strainer  no chest pain or fever or sob   no new focal deficit      PHYSICAL EXAM:    GENERAL: Moderately built, no acute distress   CHEST/LUNG:  No wheezing, no crackles   HEART: Regular rate and rhythm; No murmurs  ABDOMEN: Soft, TTP right abd?,  Nondistended; Bowel sounds present  EXTREMITIES:  No clubbing, cyanosis, or edema  NERVOUS SYSTEM:  Grossly non focal.  Psychiatry: AAO x 3, mood is appropriate       OBJECTIVE DATA:   Vital Signs Last 24 Hrs  T(C): 36.9 (02 Dec 2023 11:25), Max: 37.3 (02 Dec 2023 05:04)  T(F): 98.5 (02 Dec 2023 11:25), Max: 99.1 (02 Dec 2023 05:04)  HR: 69 (02 Dec 2023 11:25) (67 - 70)  BP: 137/79 (02 Dec 2023 11:25) (133/77 - 144/80)  BP(mean): --  RR: 17 (02 Dec 2023 11:25) (17 - 18)  SpO2: 97% (02 Dec 2023 11:25) (92% - 97%)    Parameters below as of 02 Dec 2023 11:25  Patient On (Oxygen Delivery Method): room air               Daily     Daily   LABS:                        11.2   6.13  )-----------( 194      ( 02 Dec 2023 07:50 )             33.5             12-02    140  |  110<H>  |  18  ----------------------------<  118<H>  3.5   |  24  |  1.32<H>    Ca    8.3<L>      02 Dec 2023 07:50  Phos  3.9     12-02  Mg     1.8     12-02    TPro  6.1  /  Alb  2.7<L>  /  TBili  0.2  /  DBili  x   /  AST  19  /  ALT  23  /  AlkPhos  64  12-02              PT/INR - ( 01 Dec 2023 08:05 )   PT: 12.2 sec;   INR: 1.02 ratio         PTT - ( 01 Dec 2023 08:05 )  PTT:31.5 sec  Urinalysis Basic - ( 02 Dec 2023 07:50 )    Color: x / Appearance: x / SG: x / pH: x  Gluc: 118 mg/dL / Ketone: x  / Bili: x / Urobili: x   Blood: x / Protein: x / Nitrite: x   Leuk Esterase: x / RBC: x / WBC x   Sq Epi: x / Non Sq Epi: x / Bacteria: x           Culture - Urine  Source: Clean Catch Clean Catch (Midstream)  Final Report (12-02):    No growth         Interval Radiology studies: reviewed by me    < from: CT Abdomen and Pelvis No Cont (11.30.23 @ 19:28) >  4 mm calculus in the proximal right ureter. 3 mm calculusin the distal   right ureter. 4 mm calculus at the right UVJ. Associated mild right   hydroureteronephrosis. Small nonobstructive calculi in the right kidney   and right renal pelvis measuring up to 9 mm.    < end of copied text >      MEDICATIONS  (STANDING):  amLODIPine   Tablet 10 milliGRAM(s) Oral daily  atorvastatin 40 milliGRAM(s) Oral at bedtime  influenza  Vaccine (HIGH DOSE) 0.7 milliLiter(s) IntraMuscular once  lactated ringers. 1000 milliLiter(s) (120 mL/Hr) IV Continuous <Continuous>  tamsulosin 0.4 milliGRAM(s) Oral at bedtime  valsartan 320 milliGRAM(s) Oral daily    MEDICATIONS  (PRN):  acetaminophen     Tablet .. 650 milliGRAM(s) Oral every 6 hours PRN Mild Pain (1 - 3)  acetaminophen   IVPB .. 1000 milliGRAM(s) IV Intermittent once PRN Mild Pain (1 - 3)  HYDROmorphone  Injectable 0.5 milliGRAM(s) IV Push every 4 hours PRN Severe Pain (7 - 10)  ketorolac   Injectable 15 milliGRAM(s) IV Push every 6 hours PRN Moderate Pain (4 - 6)  melatonin 3 milliGRAM(s) Oral at bedtime PRN Insomnia  ondansetron Injectable 4 milliGRAM(s) IV Push every 6 hours PRN Nausea and/or Vomiting

## 2023-12-03 LAB
ANION GAP SERPL CALC-SCNC: 4 MMOL/L — LOW (ref 5–17)
ANION GAP SERPL CALC-SCNC: 4 MMOL/L — LOW (ref 5–17)
BUN SERPL-MCNC: 13 MG/DL — SIGNIFICANT CHANGE UP (ref 7–23)
BUN SERPL-MCNC: 13 MG/DL — SIGNIFICANT CHANGE UP (ref 7–23)
CALCIUM SERPL-MCNC: 8.5 MG/DL — SIGNIFICANT CHANGE UP (ref 8.5–10.1)
CALCIUM SERPL-MCNC: 8.5 MG/DL — SIGNIFICANT CHANGE UP (ref 8.5–10.1)
CHLORIDE SERPL-SCNC: 105 MMOL/L — SIGNIFICANT CHANGE UP (ref 96–108)
CHLORIDE SERPL-SCNC: 105 MMOL/L — SIGNIFICANT CHANGE UP (ref 96–108)
CO2 SERPL-SCNC: 28 MMOL/L — SIGNIFICANT CHANGE UP (ref 22–31)
CO2 SERPL-SCNC: 28 MMOL/L — SIGNIFICANT CHANGE UP (ref 22–31)
CREAT SERPL-MCNC: 1.36 MG/DL — HIGH (ref 0.5–1.3)
CREAT SERPL-MCNC: 1.36 MG/DL — HIGH (ref 0.5–1.3)
EGFR: 57 ML/MIN/1.73M2 — LOW
EGFR: 57 ML/MIN/1.73M2 — LOW
GLUCOSE SERPL-MCNC: 119 MG/DL — HIGH (ref 70–99)
GLUCOSE SERPL-MCNC: 119 MG/DL — HIGH (ref 70–99)
HCT VFR BLD CALC: 33.9 % — LOW (ref 39–50)
HCT VFR BLD CALC: 33.9 % — LOW (ref 39–50)
HGB BLD-MCNC: 11.4 G/DL — LOW (ref 13–17)
HGB BLD-MCNC: 11.4 G/DL — LOW (ref 13–17)
MCHC RBC-ENTMCNC: 28.2 PG — SIGNIFICANT CHANGE UP (ref 27–34)
MCHC RBC-ENTMCNC: 28.2 PG — SIGNIFICANT CHANGE UP (ref 27–34)
MCHC RBC-ENTMCNC: 33.6 G/DL — SIGNIFICANT CHANGE UP (ref 32–36)
MCHC RBC-ENTMCNC: 33.6 G/DL — SIGNIFICANT CHANGE UP (ref 32–36)
MCV RBC AUTO: 83.9 FL — SIGNIFICANT CHANGE UP (ref 80–100)
MCV RBC AUTO: 83.9 FL — SIGNIFICANT CHANGE UP (ref 80–100)
NRBC # BLD: 0 /100 WBCS — SIGNIFICANT CHANGE UP (ref 0–0)
NRBC # BLD: 0 /100 WBCS — SIGNIFICANT CHANGE UP (ref 0–0)
PLATELET # BLD AUTO: 198 K/UL — SIGNIFICANT CHANGE UP (ref 150–400)
PLATELET # BLD AUTO: 198 K/UL — SIGNIFICANT CHANGE UP (ref 150–400)
POTASSIUM SERPL-MCNC: 3.3 MMOL/L — LOW (ref 3.5–5.3)
POTASSIUM SERPL-MCNC: 3.3 MMOL/L — LOW (ref 3.5–5.3)
POTASSIUM SERPL-SCNC: 3.3 MMOL/L — LOW (ref 3.5–5.3)
POTASSIUM SERPL-SCNC: 3.3 MMOL/L — LOW (ref 3.5–5.3)
RBC # BLD: 4.04 M/UL — LOW (ref 4.2–5.8)
RBC # BLD: 4.04 M/UL — LOW (ref 4.2–5.8)
RBC # FLD: 12.5 % — SIGNIFICANT CHANGE UP (ref 10.3–14.5)
RBC # FLD: 12.5 % — SIGNIFICANT CHANGE UP (ref 10.3–14.5)
SODIUM SERPL-SCNC: 137 MMOL/L — SIGNIFICANT CHANGE UP (ref 135–145)
SODIUM SERPL-SCNC: 137 MMOL/L — SIGNIFICANT CHANGE UP (ref 135–145)
WBC # BLD: 6.03 K/UL — SIGNIFICANT CHANGE UP (ref 3.8–10.5)
WBC # BLD: 6.03 K/UL — SIGNIFICANT CHANGE UP (ref 3.8–10.5)
WBC # FLD AUTO: 6.03 K/UL — SIGNIFICANT CHANGE UP (ref 3.8–10.5)
WBC # FLD AUTO: 6.03 K/UL — SIGNIFICANT CHANGE UP (ref 3.8–10.5)

## 2023-12-03 PROCEDURE — 99233 SBSQ HOSP IP/OBS HIGH 50: CPT

## 2023-12-03 PROCEDURE — 99232 SBSQ HOSP IP/OBS MODERATE 35: CPT

## 2023-12-03 PROCEDURE — 99231 SBSQ HOSP IP/OBS SF/LOW 25: CPT

## 2023-12-03 RX ORDER — POTASSIUM CHLORIDE 20 MEQ
10 PACKET (EA) ORAL
Refills: 0 | Status: COMPLETED | OUTPATIENT
Start: 2023-12-03 | End: 2023-12-03

## 2023-12-03 RX ORDER — BACITRACIN ZINC 500 UNIT/G
1 OINTMENT IN PACKET (EA) TOPICAL
Refills: 0 | Status: DISCONTINUED | OUTPATIENT
Start: 2023-12-03 | End: 2023-12-04

## 2023-12-03 RX ADMIN — HYDROMORPHONE HYDROCHLORIDE 0.5 MILLIGRAM(S): 2 INJECTION INTRAMUSCULAR; INTRAVENOUS; SUBCUTANEOUS at 06:34

## 2023-12-03 RX ADMIN — Medication 100 MILLIEQUIVALENT(S): at 15:04

## 2023-12-03 RX ADMIN — VALSARTAN 320 MILLIGRAM(S): 80 TABLET ORAL at 05:13

## 2023-12-03 RX ADMIN — TAMSULOSIN HYDROCHLORIDE 0.4 MILLIGRAM(S): 0.4 CAPSULE ORAL at 21:58

## 2023-12-03 RX ADMIN — Medication 1 APPLICATION(S): at 21:58

## 2023-12-03 RX ADMIN — Medication 100 MILLIEQUIVALENT(S): at 18:10

## 2023-12-03 RX ADMIN — AMLODIPINE BESYLATE 10 MILLIGRAM(S): 2.5 TABLET ORAL at 05:13

## 2023-12-03 RX ADMIN — HYDROMORPHONE HYDROCHLORIDE 0.5 MILLIGRAM(S): 2 INJECTION INTRAMUSCULAR; INTRAVENOUS; SUBCUTANEOUS at 05:34

## 2023-12-03 RX ADMIN — ATORVASTATIN CALCIUM 40 MILLIGRAM(S): 80 TABLET, FILM COATED ORAL at 21:58

## 2023-12-03 RX ADMIN — Medication 100 MILLIEQUIVALENT(S): at 13:16

## 2023-12-03 NOTE — PROGRESS NOTE ADULT - ASSESSMENT
68 years old male with h/o HTN, HLD, nephrolithiasis present to ED with complain of severe right flank pain radiate to right groin, associated with nausea and vomiting. Patient had ? lithotripsy procedure at [facility] yesterday. No fever or chills.   Hemodynamically stable, afebrile, sat well at [facility]. No leukocytosis, plt 214, K 3.6, Cr 1.2. CT abd/pelvis with 4mm calculus in the proximal right ureter. 3 mm calculus in the distal right ureter. 4 mm calculus at the right UVJ. Associated mild right hydroureteronephrosis. Small nonobstructive calculi in the right kidney and right renal pelvis measuring up to 9 mm.      Problem/Plan - 1:  ·  Severe right flank pain with multiple small right ureteral obstructive stones and associated right hydronephrosis and azotemia. patient s/p lithotripsy on 11/30/23. + hematuria.   cont iv dilaudid for severe pain and monitor for any oversedation.   cont Aggressive hydration and use strainer to collect stones. Cont flomax   no evidence of any infection at this time. No antibiotics at this time. Urology following.     Problem/Plan - 4:  ·  Problem: Benign essential HTN.   · controlled. cont to monitor.     Problem/Plan - 5:  ·  Problem: Hyperlipidemia, unspecified.   ·  Plan: on atorvastatin 20mg hs.    Anemia. normocytic. not blood loss.     Overweight. diet and exercise.     ERNESTO. worsening creatinine. cont IVF. DC iv toradol. Trend creatinine.     Hypokalemia: Replete and recheck     Full Code  SCDs    Time spent by me managing the patient is 52 mins  68 years old male with h/o HTN, HLD, nephrolithiasis present to ED with complain of severe right flank pain radiate to right groin, associated with nausea and vomiting. Patient had ? lithotripsy procedure at [facility] yesterday. No fever or chills.   Hemodynamically stable, afebrile, sat well at [facility]. No leukocytosis, plt 214, K 3.6, Cr 1.2. CT abd/pelvis with 4mm calculus in the proximal right ureter. 3 mm calculus in the distal right ureter. 4 mm calculus at the right UVJ. Associated mild right hydroureteronephrosis. Small nonobstructive calculi in the right kidney and right renal pelvis measuring up to 9 mm.      Problem/Plan - 1:  ·  Severe right flank pain with multiple small right ureteral obstructive stones and associated right hydronephrosis and azotemia. patient s/p lithotripsy on 11/30/23. + hematuria.   cont iv dilaudid for severe pain and monitor for any oversedation.   cont Aggressive hydration and use strainer to collect stones. Cont flomax   no evidence of any infection at this time. No antibiotics at this time. Urology following.     Problem/Plan - 4:  ·  Problem: Benign essential HTN.   · controlled. cont to monitor.     Problem/Plan - 5:  ·  Problem: Hyperlipidemia, unspecified.   ·  Plan: on atorvastatin 20mg hs.    Anemia. normocytic. not blood loss.     Overweight. diet and exercise.     ERNESTO. worsening creatinine. cont IVF. DC iv toradol. Trend creatinine.     Hypokalemia: Replete and recheck     Full Code  SCDs    Time spent by me managing the patient is 52 mins  68 years old male with h/o HTN, HLD, nephrolithiasis present to ED with complain of severe right flank pain radiate to right groin, associated with nausea and vomiting. Patient had ? lithotripsy procedure at [facility] yesterday. No fever or chills.   Hemodynamically stable, afebrile, sat well at [facility]. No leukocytosis, plt 214, K 3.6, Cr 1.2. CT abd/pelvis with 4mm calculus in the proximal right ureter. 3 mm calculus in the distal right ureter. 4 mm calculus at the right UVJ. Associated mild right hydroureteronephrosis. Small nonobstructive calculi in the right kidney and right renal pelvis measuring up to 9 mm.      Problem/Plan - 1:  ·  Severe right flank pain with multiple small right ureteral obstructive stones and associated right hydronephrosis and azotemia. patient s/p lithotripsy on 11/30/23. + hematuria.   cont iv dilaudid for severe pain and monitor for any oversedation.   cont Aggressive hydration and use strainer to collect stones. Cont flomax   no evidence of any infection at this time. No antibiotics at this time. Urology following.     Problem/Plan - 4:  ·  Problem: Benign essential HTN.   · controlled. cont to monitor.     Problem/Plan - 5:  ·  Problem: Hyperlipidemia, unspecified.   ·  Plan: on atorvastatin 20mg hs.    Anemia. normocytic. not blood loss.     Overweight. diet and exercise.     ERNESTO. worsening creatinine. cont IVF. DC iv toradol. Trend creatinine.     Hypokalemia: Replete and recheck     Full Code  SCDs    Time spent by me managing the patient is 52 mins

## 2023-12-03 NOTE — PROGRESS NOTE ADULT - SUBJECTIVE AND OBJECTIVE BOX
CHIEF COMPLAINT: FU of severe right flank pain  still reporting severe pain requiring iv dilaudid.   one more stone passed per patient.   no chest pain or fever or sob   no new focal deficit  swelling right arm likely from iv infiltration.     PHYSICAL EXAM:    GENERAL: Moderately built, no acute distress   CHEST/LUNG:  No wheezing, no crackles   HEART: Regular rate and rhythm; No murmurs  ABDOMEN: Soft, TTP right abd?,  Nondistended; Bowel sounds present  NERVOUS SYSTEM:  Grossly non focal.  RUE edema around iv insertion site.   Psychiatry: AAO x 3, mood is appropriate       OBJECTIVE DATA:     Vital Signs Last 24 Hrs  T(C): 36.3 (03 Dec 2023 05:09), Max: 36.9 (02 Dec 2023 11:25)  T(F): 97.4 (03 Dec 2023 05:09), Max: 98.5 (02 Dec 2023 11:25)  HR: 72 (03 Dec 2023 05:09) (68 - 74)  BP: 155/73 (03 Dec 2023 05:09) (137/79 - 157/78)  BP(mean): --  RR: 18 (03 Dec 2023 05:09) (17 - 18)  SpO2: 98% (03 Dec 2023 05:09) (96% - 98%)    Parameters below as of 03 Dec 2023 05:09  Patient On (Oxygen Delivery Method): room air               Daily     Daily   LABS:                        11.4   6.03  )-----------( 198      ( 03 Dec 2023 07:30 )             33.9             12-03    137  |  105  |  13  ----------------------------<  119<H>  3.3<L>   |  28  |  1.36<H>    Ca    8.5      03 Dec 2023 07:30  Phos  3.9     12-02  Mg     1.8     12-02    TPro  6.1  /  Alb  2.7<L>  /  TBili  0.2  /  DBili  x   /  AST  19  /  ALT  23  /  AlkPhos  64  12-02                Urinalysis Basic - ( 03 Dec 2023 07:30 )    Color: x / Appearance: x / SG: x / pH: x  Gluc: 119 mg/dL / Ketone: x  / Bili: x / Urobili: x   Blood: x / Protein: x / Nitrite: x   Leuk Esterase: x / RBC: x / WBC x   Sq Epi: x / Non Sq Epi: x / Bacteria: x      Culture - Urine (collected 11-30)  Source: Clean Catch Clean Catch (Midstream)  Final Report (12-02):    No growth          MEDICATIONS  (STANDING):  amLODIPine   Tablet 10 milliGRAM(s) Oral daily  atorvastatin 40 milliGRAM(s) Oral at bedtime  influenza  Vaccine (HIGH DOSE) 0.7 milliLiter(s) IntraMuscular once  lactated ringers. 1000 milliLiter(s) (120 mL/Hr) IV Continuous <Continuous>  potassium chloride  10 mEq/100 mL IVPB 10 milliEquivalent(s) IV Intermittent every 1 hour  tamsulosin 0.4 milliGRAM(s) Oral at bedtime  valsartan 320 milliGRAM(s) Oral daily    MEDICATIONS  (PRN):  acetaminophen     Tablet .. 650 milliGRAM(s) Oral every 6 hours PRN Mild Pain (1 - 3)  acetaminophen   IVPB .. 1000 milliGRAM(s) IV Intermittent once PRN Mild Pain (1 - 3)  HYDROmorphone  Injectable 0.5 milliGRAM(s) IV Push every 4 hours PRN Severe Pain (7 - 10)  melatonin 3 milliGRAM(s) Oral at bedtime PRN Insomnia  ondansetron Injectable 4 milliGRAM(s) IV Push every 6 hours PRN Nausea and/or Vomiting

## 2023-12-03 NOTE — PROGRESS NOTE ADULT - NSPROGADDITIONALINFOA_GEN_ALL_CORE
pt still has pain, right CVA tenderness present, Creatinine worsening. Discussed with pt , ureteroscopy, poss removal of stones and insertion of stent. He will think about it.   If no pain, will discharge tomorrow to follow as outpatient.

## 2023-12-03 NOTE — CHART NOTE - NSCHARTNOTEFT_GEN_A_CORE
Patient seen for Food Insecurity nutrition risk rescreening. Patient has been screened for and presents negative for    -Pressure ulcers stage 2 or greater  -Enteral or Parenteral Nutrition  -BMI <18  -IzlmshykobB6W >8  -Chewing/Swallowing Difficulty  -Decreased appetite  -Unintentional Weight Loss  -Inadequate diet (i.e. NPO/Clear Liquids)    No intervention required at this time. RD remains available. Pt reports he is not Food Insecure. Seen on medical floor, adm w hydronephrosis, nephrolithiasis, severe pain. Benign essential HTN ; HLD ; Anemia. Lives alone and does food shopping / cooking for himself. Denies N/V/D/C/Chewing/Swallowing issues, No food allergies. Consuming 75% of meals. No c/o at this time. Patient seen for Food Insecurity nutrition risk rescreening. Patient has been screened for and presents negative for    -Pressure ulcers stage 2 or greater  -Enteral or Parenteral Nutrition  -BMI <18  -NzmthkgdgnO0I >8  -Chewing/Swallowing Difficulty  -Decreased appetite  -Unintentional Weight Loss  -Inadequate diet (i.e. NPO/Clear Liquids)    No intervention required at this time. RD remains available. Pt reports he is not Food Insecure. Seen on medical floor, adm w hydronephrosis, nephrolithiasis, severe pain. Benign essential HTN ; HLD ; Anemia. Lives alone and does food shopping / cooking for himself. Denies N/V/D/C/Chewing/Swallowing issues, No food allergies. Consuming 75% of meals. No c/o at this time. Patient seen for Food Insecurity nutrition risk rescreening. Patient has been screened for and presents negative for    -Pressure ulcers stage 2 or greater  -Enteral or Parenteral Nutrition  -BMI <18  -LpepuxsleuU5H >8  -Chewing/Swallowing Difficulty  -Decreased appetite  -Unintentional Weight Loss  -Inadequate diet (i.e. NPO/Clear Liquids)    No intervention required at this time. RD remains available. Pt reports he is not Food Insecure. Seen on medical floor, adm w hydronephrosis, nephrolithiasis, severe pain. Benign essential HTN ; HLD ; Anemia. Lives alone and does food shopping / cooking for himself. Denies N/V/D/C/Chewing/Swallowing issues, No food allergies. Consuming 75% of meals. No c/o at this time.

## 2023-12-03 NOTE — CHART NOTE - NSCHARTNOTEFT_GEN_A_CORE
Called to assess pt for an infiltrate . Upon arrival pt A&Ox4. Reports it was from an IV . At present no IV at the site , although Right AC with edema , a few closed and open blisters . Compartment soft, neurovascular intact ,< 2 sec cap refil . + radial pulses . Pt appears in NAD .   A&P  IV infiltrate with blisters   Neurovascular checks every 4 hours for 24 hrs   bacitracin   warm soaks

## 2023-12-03 NOTE — PROGRESS NOTE ADULT - SUBJECTIVE AND OBJECTIVE BOX
Pt seen and examined at bedside in no distress.  States he passed one stone, but has persistent right flank discomfort, improved since admission.  Denies fever, chills, chest pain, sob, nausea.    T(F): 97.4 (12-03-23 @ 05:09), Max: 98.5 (12-02-23 @ 11:25)  HR: 72 (12-03-23 @ 05:09) (68 - 74)  BP: 155/73 (12-03-23 @ 05:09) (137/79 - 157/78)  RR: 18 (12-03-23 @ 05:09) (17 - 18)  SpO2: 98% (12-03-23 @ 05:09) (96% - 98%)    PHYSICAL EXAM:  General: Alert & oriented x 3, NAD   Abdomen: Soft, NTND  : Clear, yellow urine in bedside urinal, no cva tenderness     LABS:                        11.4   6.03  )-----------( 198      ( 03 Dec 2023 07:30 )             33.9     12-03    137  |  105  |  13  ----------------------------<  119<H>  3.3<L>   |  28  |  1.36<H>    Ca    8.5      03 Dec 2023 07:30    A/P: 68M a/w right flank pain, found to have 3 right-sided ureteral stones (4mm prox R ureter, 3mm distal R ureter, 4mm R UVJ) with mild right hydro s/p remote right ureteral lithotripsy OSH 11/30  Creatinine with slight increase today  Hypokalemia   -- cont aggressive IV fluid hydration  -- flomax  -- strain urine  -- antibiotics  -- replete K   -- will d/w Dr. Malone

## 2023-12-04 ENCOUNTER — RESULT REVIEW (OUTPATIENT)
Age: 68
End: 2023-12-04

## 2023-12-04 LAB
ANION GAP SERPL CALC-SCNC: 6 MMOL/L — SIGNIFICANT CHANGE UP (ref 5–17)
ANION GAP SERPL CALC-SCNC: 6 MMOL/L — SIGNIFICANT CHANGE UP (ref 5–17)
APTT BLD: 29.6 SEC — SIGNIFICANT CHANGE UP (ref 24.5–35.6)
APTT BLD: 29.6 SEC — SIGNIFICANT CHANGE UP (ref 24.5–35.6)
BLD GP AB SCN SERPL QL: SIGNIFICANT CHANGE UP
BLD GP AB SCN SERPL QL: SIGNIFICANT CHANGE UP
BUN SERPL-MCNC: 14 MG/DL — SIGNIFICANT CHANGE UP (ref 7–23)
BUN SERPL-MCNC: 14 MG/DL — SIGNIFICANT CHANGE UP (ref 7–23)
CALCIUM SERPL-MCNC: 8.8 MG/DL — SIGNIFICANT CHANGE UP (ref 8.5–10.1)
CALCIUM SERPL-MCNC: 8.8 MG/DL — SIGNIFICANT CHANGE UP (ref 8.5–10.1)
CHLORIDE SERPL-SCNC: 105 MMOL/L — SIGNIFICANT CHANGE UP (ref 96–108)
CHLORIDE SERPL-SCNC: 105 MMOL/L — SIGNIFICANT CHANGE UP (ref 96–108)
CO2 SERPL-SCNC: 27 MMOL/L — SIGNIFICANT CHANGE UP (ref 22–31)
CO2 SERPL-SCNC: 27 MMOL/L — SIGNIFICANT CHANGE UP (ref 22–31)
CREAT SERPL-MCNC: 1.35 MG/DL — HIGH (ref 0.5–1.3)
CREAT SERPL-MCNC: 1.35 MG/DL — HIGH (ref 0.5–1.3)
EGFR: 58 ML/MIN/1.73M2 — LOW
EGFR: 58 ML/MIN/1.73M2 — LOW
GLUCOSE SERPL-MCNC: 112 MG/DL — HIGH (ref 70–99)
GLUCOSE SERPL-MCNC: 112 MG/DL — HIGH (ref 70–99)
HCT VFR BLD CALC: 35.1 % — LOW (ref 39–50)
HCT VFR BLD CALC: 35.1 % — LOW (ref 39–50)
HGB BLD-MCNC: 12.1 G/DL — LOW (ref 13–17)
HGB BLD-MCNC: 12.1 G/DL — LOW (ref 13–17)
INR BLD: 1.06 RATIO — SIGNIFICANT CHANGE UP (ref 0.85–1.18)
INR BLD: 1.06 RATIO — SIGNIFICANT CHANGE UP (ref 0.85–1.18)
MAGNESIUM SERPL-MCNC: 2 MG/DL — SIGNIFICANT CHANGE UP (ref 1.6–2.6)
MAGNESIUM SERPL-MCNC: 2 MG/DL — SIGNIFICANT CHANGE UP (ref 1.6–2.6)
MCHC RBC-ENTMCNC: 29 PG — SIGNIFICANT CHANGE UP (ref 27–34)
MCHC RBC-ENTMCNC: 29 PG — SIGNIFICANT CHANGE UP (ref 27–34)
MCHC RBC-ENTMCNC: 34.5 G/DL — SIGNIFICANT CHANGE UP (ref 32–36)
MCHC RBC-ENTMCNC: 34.5 G/DL — SIGNIFICANT CHANGE UP (ref 32–36)
MCV RBC AUTO: 84.2 FL — SIGNIFICANT CHANGE UP (ref 80–100)
MCV RBC AUTO: 84.2 FL — SIGNIFICANT CHANGE UP (ref 80–100)
NRBC # BLD: 0 /100 WBCS — SIGNIFICANT CHANGE UP (ref 0–0)
NRBC # BLD: 0 /100 WBCS — SIGNIFICANT CHANGE UP (ref 0–0)
PHOSPHATE SERPL-MCNC: 3.8 MG/DL — SIGNIFICANT CHANGE UP (ref 2.5–4.5)
PHOSPHATE SERPL-MCNC: 3.8 MG/DL — SIGNIFICANT CHANGE UP (ref 2.5–4.5)
PLATELET # BLD AUTO: 211 K/UL — SIGNIFICANT CHANGE UP (ref 150–400)
PLATELET # BLD AUTO: 211 K/UL — SIGNIFICANT CHANGE UP (ref 150–400)
POTASSIUM SERPL-MCNC: 3.5 MMOL/L — SIGNIFICANT CHANGE UP (ref 3.5–5.3)
POTASSIUM SERPL-MCNC: 3.5 MMOL/L — SIGNIFICANT CHANGE UP (ref 3.5–5.3)
POTASSIUM SERPL-SCNC: 3.5 MMOL/L — SIGNIFICANT CHANGE UP (ref 3.5–5.3)
POTASSIUM SERPL-SCNC: 3.5 MMOL/L — SIGNIFICANT CHANGE UP (ref 3.5–5.3)
PROTHROM AB SERPL-ACNC: 12.6 SEC — SIGNIFICANT CHANGE UP (ref 9.5–13)
PROTHROM AB SERPL-ACNC: 12.6 SEC — SIGNIFICANT CHANGE UP (ref 9.5–13)
RBC # BLD: 4.17 M/UL — LOW (ref 4.2–5.8)
RBC # BLD: 4.17 M/UL — LOW (ref 4.2–5.8)
RBC # FLD: 12.6 % — SIGNIFICANT CHANGE UP (ref 10.3–14.5)
RBC # FLD: 12.6 % — SIGNIFICANT CHANGE UP (ref 10.3–14.5)
SODIUM SERPL-SCNC: 138 MMOL/L — SIGNIFICANT CHANGE UP (ref 135–145)
SODIUM SERPL-SCNC: 138 MMOL/L — SIGNIFICANT CHANGE UP (ref 135–145)
WBC # BLD: 5.79 K/UL — SIGNIFICANT CHANGE UP (ref 3.8–10.5)
WBC # BLD: 5.79 K/UL — SIGNIFICANT CHANGE UP (ref 3.8–10.5)
WBC # FLD AUTO: 5.79 K/UL — SIGNIFICANT CHANGE UP (ref 3.8–10.5)
WBC # FLD AUTO: 5.79 K/UL — SIGNIFICANT CHANGE UP (ref 3.8–10.5)

## 2023-12-04 PROCEDURE — 99232 SBSQ HOSP IP/OBS MODERATE 35: CPT | Mod: 25

## 2023-12-04 PROCEDURE — 99233 SBSQ HOSP IP/OBS HIGH 50: CPT

## 2023-12-04 PROCEDURE — 74420 UROGRAPHY RTRGR +-KUB: CPT | Mod: 26

## 2023-12-04 PROCEDURE — 52356 CYSTO/URETERO W/LITHOTRIPSY: CPT | Mod: RT,22

## 2023-12-04 PROCEDURE — 88300 SURGICAL PATH GROSS: CPT | Mod: 26

## 2023-12-04 DEVICE — LASER FIBER SOLTIVE 365: Type: IMPLANTABLE DEVICE | Site: RIGHT | Status: FUNCTIONAL

## 2023-12-04 DEVICE — STONE BASKET ESCAPE NITINOL 4-WIRE 1.9FR X 120CM: Type: IMPLANTABLE DEVICE | Site: RIGHT | Status: FUNCTIONAL

## 2023-12-04 DEVICE — BALLOON CATH UROMAX ULTRA 15FR X 6CM: Type: IMPLANTABLE DEVICE | Site: RIGHT | Status: FUNCTIONAL

## 2023-12-04 DEVICE — STENT URET LUBRIFLX 6X28CM OPEN TIP: Type: IMPLANTABLE DEVICE | Site: RIGHT | Status: FUNCTIONAL

## 2023-12-04 RX ORDER — ONDANSETRON 8 MG/1
4 TABLET, FILM COATED ORAL ONCE
Refills: 0 | Status: DISCONTINUED | OUTPATIENT
Start: 2023-12-04 | End: 2023-12-04

## 2023-12-04 RX ORDER — ONDANSETRON 8 MG/1
4 TABLET, FILM COATED ORAL EVERY 6 HOURS
Refills: 0 | Status: DISCONTINUED | OUTPATIENT
Start: 2023-12-04 | End: 2023-12-05

## 2023-12-04 RX ORDER — BACITRACIN ZINC 500 UNIT/G
1 OINTMENT IN PACKET (EA) TOPICAL
Refills: 0 | Status: DISCONTINUED | OUTPATIENT
Start: 2023-12-04 | End: 2023-12-05

## 2023-12-04 RX ORDER — LANOLIN ALCOHOL/MO/W.PET/CERES
3 CREAM (GRAM) TOPICAL AT BEDTIME
Refills: 0 | Status: DISCONTINUED | OUTPATIENT
Start: 2023-12-04 | End: 2023-12-05

## 2023-12-04 RX ORDER — TAMSULOSIN HYDROCHLORIDE 0.4 MG/1
0.4 CAPSULE ORAL AT BEDTIME
Refills: 0 | Status: DISCONTINUED | OUTPATIENT
Start: 2023-12-04 | End: 2023-12-05

## 2023-12-04 RX ORDER — AMLODIPINE BESYLATE 2.5 MG/1
10 TABLET ORAL DAILY
Refills: 0 | Status: DISCONTINUED | OUTPATIENT
Start: 2023-12-04 | End: 2023-12-05

## 2023-12-04 RX ORDER — CEFTRIAXONE 500 MG/1
1000 INJECTION, POWDER, FOR SOLUTION INTRAMUSCULAR; INTRAVENOUS EVERY 24 HOURS
Refills: 0 | Status: DISCONTINUED | OUTPATIENT
Start: 2023-12-05 | End: 2023-12-05

## 2023-12-04 RX ORDER — SODIUM CHLORIDE 9 MG/ML
1000 INJECTION, SOLUTION INTRAVENOUS
Refills: 0 | Status: DISCONTINUED | OUTPATIENT
Start: 2023-12-04 | End: 2023-12-05

## 2023-12-04 RX ORDER — ATORVASTATIN CALCIUM 80 MG/1
40 TABLET, FILM COATED ORAL AT BEDTIME
Refills: 0 | Status: DISCONTINUED | OUTPATIENT
Start: 2023-12-04 | End: 2023-12-05

## 2023-12-04 RX ORDER — FENTANYL CITRATE 50 UG/ML
25 INJECTION INTRAVENOUS
Refills: 0 | Status: DISCONTINUED | OUTPATIENT
Start: 2023-12-04 | End: 2023-12-04

## 2023-12-04 RX ORDER — VALSARTAN 80 MG/1
320 TABLET ORAL DAILY
Refills: 0 | Status: DISCONTINUED | OUTPATIENT
Start: 2023-12-04 | End: 2023-12-05

## 2023-12-04 RX ORDER — SODIUM CHLORIDE 9 MG/ML
1000 INJECTION, SOLUTION INTRAVENOUS
Refills: 0 | Status: DISCONTINUED | OUTPATIENT
Start: 2023-12-04 | End: 2023-12-04

## 2023-12-04 RX ORDER — ACETAMINOPHEN 500 MG
650 TABLET ORAL EVERY 6 HOURS
Refills: 0 | Status: DISCONTINUED | OUTPATIENT
Start: 2023-12-04 | End: 2023-12-05

## 2023-12-04 RX ADMIN — Medication 400 MILLIGRAM(S): at 06:16

## 2023-12-04 RX ADMIN — Medication 1 APPLICATION(S): at 06:13

## 2023-12-04 RX ADMIN — HYDROMORPHONE HYDROCHLORIDE 0.5 MILLIGRAM(S): 2 INJECTION INTRAMUSCULAR; INTRAVENOUS; SUBCUTANEOUS at 02:49

## 2023-12-04 RX ADMIN — Medication 1 APPLICATION(S): at 18:05

## 2023-12-04 RX ADMIN — SODIUM CHLORIDE 120 MILLILITER(S): 9 INJECTION, SOLUTION INTRAVENOUS at 18:05

## 2023-12-04 RX ADMIN — SODIUM CHLORIDE 120 MILLILITER(S): 9 INJECTION, SOLUTION INTRAVENOUS at 02:50

## 2023-12-04 RX ADMIN — TAMSULOSIN HYDROCHLORIDE 0.4 MILLIGRAM(S): 0.4 CAPSULE ORAL at 21:26

## 2023-12-04 RX ADMIN — AMLODIPINE BESYLATE 10 MILLIGRAM(S): 2.5 TABLET ORAL at 06:12

## 2023-12-04 RX ADMIN — ATORVASTATIN CALCIUM 40 MILLIGRAM(S): 80 TABLET, FILM COATED ORAL at 21:26

## 2023-12-04 RX ADMIN — HYDROMORPHONE HYDROCHLORIDE 0.5 MILLIGRAM(S): 2 INJECTION INTRAMUSCULAR; INTRAVENOUS; SUBCUTANEOUS at 03:49

## 2023-12-04 RX ADMIN — VALSARTAN 320 MILLIGRAM(S): 80 TABLET ORAL at 06:12

## 2023-12-04 RX ADMIN — SODIUM CHLORIDE 75 MILLILITER(S): 9 INJECTION, SOLUTION INTRAVENOUS at 17:05

## 2023-12-04 NOTE — BRIEF OPERATIVE NOTE - NSICDXBRIEFPREOP_GEN_ALL_CORE_FT
PRE-OP DIAGNOSIS:  Hydronephrosis, right 04-Dec-2023 16:45:05  Louis Malone  Renal stones 04-Dec-2023 16:45:14  Louis Malone  Right ureteral stone 04-Dec-2023 16:45:38  Louis Malone

## 2023-12-04 NOTE — PROGRESS NOTE ADULT - SUBJECTIVE AND OBJECTIVE BOX
Patient seen and  examined at bedside resting comfortably s/p cystoscopy, right ureteoscopy and JJstent placement  Offers no complaints at this time.   Moreria catheter in place.   Denies fever, chills, N/V/D, CP, SOB.     Vital Signs Last 24 Hrs  T(F): 98 (12-04-23 @ 21:30), Max: 98.4 (12-04-23 @ 05:15)  HR: 71 (12-04-23 @ 21:30)  BP: 127/77 (12-04-23 @ 21:30)  RR: 18 (12-04-23 @ 21:30)  SpO2: 96% (12-04-23 @ 21:30)    PHYSICAL EXAM:  GENERAL: Alert, NAD  CHEST/LUNG: Clear to auscultation bilaterally, respirations nonlabored  HEART: Regular rate and rhythm; S1 & S2 appreciated  ABDOMEN: + Bowel sounds, soft, Nontender, Nondistended  : no suprapubic tenderness or distention. Moreira catheter in place with pink lemonade colored urine in tubing  EXTREMITIES:  no calf tenderness, No edema    I&O's Detail    03 Dec 2023 07:01  -  04 Dec 2023 07:00  --------------------------------------------------------  IN:    IV PiggyBack: 100 mL    Lactated Ringers: 1440 mL    Oral Fluid: 760 mL  Total IN: 2300 mL    OUT:  Total OUT: 0 mL    Total NET: 2300 mL      04 Dec 2023 07:01  -  04 Dec 2023 22:15  --------------------------------------------------------  IN:  Total IN: 0 mL    OUT:    Voided (mL): 300 mL  Total OUT: 300 mL    Total NET: -300 mL          LABS:                        12.1   5.79  )-----------( 211      ( 04 Dec 2023 06:35 )             35.1     12-04    138  |  105  |  14  ----------------------------<  112<H>  3.5   |  27  |  1.35<H>    Ca    8.8      04 Dec 2023 06:35  Phos  3.8     12-04  Mg     2.0     12-04      PT/INR - ( 04 Dec 2023 06:35 )   PT: 12.6 sec;   INR: 1.06 ratio         PTT - ( 04 Dec 2023 06:35 )  PTT:29.6 sec    RADIOLOGY & ADDITIONAL STUDIES:    A/P  68y Male s/p cystoscopy, right ureteroscopy and JJ stent placement   - continue abx  - continue moreira, will remove in AM  - IVF  - analgesia prn   - flomax   - continue care per primary team   - will d/w urology attending  Patient seen and  examined at bedside resting comfortably s/p cystoscopy, right ureteoscopy and JJstent placement  Offers no complaints at this time.   Moreira catheter in place.   Denies fever, chills, N/V/D, CP, SOB.     Vital Signs Last 24 Hrs  T(F): 98 (12-04-23 @ 21:30), Max: 98.4 (12-04-23 @ 05:15)  HR: 71 (12-04-23 @ 21:30)  BP: 127/77 (12-04-23 @ 21:30)  RR: 18 (12-04-23 @ 21:30)  SpO2: 96% (12-04-23 @ 21:30)    PHYSICAL EXAM:  GENERAL: Alert, NAD  CHEST/LUNG: Clear to auscultation bilaterally, respirations nonlabored  HEART: Regular rate and rhythm; S1 & S2 appreciated  ABDOMEN: + Bowel sounds, soft, Nontender, Nondistended  : no suprapubic tenderness or distention. Moreira catheter in place with pink lemonade colored urine in tubing  EXTREMITIES:  no calf tenderness, No edema    I&O's Detail    03 Dec 2023 07:01  -  04 Dec 2023 07:00  --------------------------------------------------------  IN:    IV PiggyBack: 100 mL    Lactated Ringers: 1440 mL    Oral Fluid: 760 mL  Total IN: 2300 mL    OUT:  Total OUT: 0 mL    Total NET: 2300 mL      04 Dec 2023 07:01  -  04 Dec 2023 22:15  --------------------------------------------------------  IN:  Total IN: 0 mL    OUT:    Voided (mL): 300 mL  Total OUT: 300 mL    Total NET: -300 mL          LABS:                        12.1   5.79  )-----------( 211      ( 04 Dec 2023 06:35 )             35.1     12-04    138  |  105  |  14  ----------------------------<  112<H>  3.5   |  27  |  1.35<H>    Ca    8.8      04 Dec 2023 06:35  Phos  3.8     12-04  Mg     2.0     12-04      PT/INR - ( 04 Dec 2023 06:35 )   PT: 12.6 sec;   INR: 1.06 ratio         PTT - ( 04 Dec 2023 06:35 )  PTT:29.6 sec    RADIOLOGY & ADDITIONAL STUDIES:    A/P  68y Male s/p cystoscopy, right ureteroscopy and JJ stent placement   - continue abx  - continue moreira, will remove in AM  - IVF  - analgesia prn   - flomax   - continue care per primary team   - will d/w urology attending

## 2023-12-04 NOTE — PROGRESS NOTE ADULT - SUBJECTIVE AND OBJECTIVE BOX
CHIEF COMPLAINT: FU of severe right flank pain  Patient has passed 2 stones already.   no chest pain or fever or sob   no new focal deficit  swelling right arm likely from iv infiltration.     PHYSICAL EXAM:    GENERAL: Moderately built, no acute distress   CHEST/LUNG:  No wheezing, no crackles   HEART: Regular rate and rhythm; No murmurs  ABDOMEN: Soft, TTP right abd?,  Nondistended; Bowel sounds present  NERVOUS SYSTEM:  Grossly non focal.  RUE edema and blisters around iv insertion site.   Psychiatry: AAO x 3, mood is appropriate       OBJECTIVE DATA:       Vital Signs Last 24 Hrs  T(C): 36.6 (04 Dec 2023 11:30), Max: 37.4 (03 Dec 2023 17:25)  T(F): 97.8 (04 Dec 2023 11:30), Max: 99.3 (03 Dec 2023 17:25)  HR: 71 (04 Dec 2023 11:30) (71 - 83)  BP: 145/84 (04 Dec 2023 11:30) (135/78 - 157/88)  BP(mean): --  RR: 17 (04 Dec 2023 11:30) (17 - 18)  SpO2: 96% (04 Dec 2023 11:30) (95% - 97%)    Parameters below as of 04 Dec 2023 11:30  Patient On (Oxygen Delivery Method): room air               Daily     Daily Weight in k.3 (03 Dec 2023 23:26)  LABS:                        12.1   5.79  )-----------( 211      ( 04 Dec 2023 06:35 )             35.1             12-04    138  |  105  |  14  ----------------------------<  112<H>  3.5   |  27  |  1.35<H>    Ca    8.8      04 Dec 2023 06:35  Phos  3.8     12-04  Mg     2.0     12-04                PT/INR - ( 04 Dec 2023 06:35 )   PT: 12.6 sec;   INR: 1.06 ratio         PTT - ( 04 Dec 2023 06:35 )  PTT:29.6 sec  Urinalysis Basic - ( 04 Dec 2023 06:35 )    Color: x / Appearance: x / SG: x / pH: x  Gluc: 112 mg/dL / Ketone: x  / Bili: x / Urobili: x   Blood: x / Protein: x / Nitrite: x   Leuk Esterase: x / RBC: x / WBC x   Sq Epi: x / Non Sq Epi: x / Bacteria: x    Culture - Urine (collected )  Source: Clean Catch Clean Catch (Midstream)  Final Report ():    No growth      MEDICATIONS  (STANDING):  amLODIPine   Tablet 10 milliGRAM(s) Oral daily  atorvastatin 40 milliGRAM(s) Oral at bedtime  bacitracin   Ointment 1 Application(s) Topical two times a day  influenza  Vaccine (HIGH DOSE) 0.7 milliLiter(s) IntraMuscular once  lactated ringers. 1000 milliLiter(s) (120 mL/Hr) IV Continuous <Continuous>  tamsulosin 0.4 milliGRAM(s) Oral at bedtime  valsartan 320 milliGRAM(s) Oral daily    MEDICATIONS  (PRN):  acetaminophen     Tablet .. 650 milliGRAM(s) Oral every 6 hours PRN Mild Pain (1 - 3)  HYDROmorphone  Injectable 0.5 milliGRAM(s) IV Push every 4 hours PRN Severe Pain (7 - 10)  melatonin 3 milliGRAM(s) Oral at bedtime PRN Insomnia  ondansetron Injectable 4 milliGRAM(s) IV Push every 6 hours PRN Nausea and/or Vomiting

## 2023-12-04 NOTE — BRIEF OPERATIVE NOTE - NSICDXBRIEFPROCEDURE_GEN_ALL_CORE_FT
PROCEDURES:  Ureteroscopy with lithotripsy, removal of calculus, and insertion of stent 04-Dec-2023 16:44:10  Louis Malone  Retrograde pyelogram 04-Dec-2023 16:44:54  Louis Malone

## 2023-12-04 NOTE — PROGRESS NOTE ADULT - REASON FOR ADMISSION
hydronephrosis, nephrolithiasis, severe pain

## 2023-12-04 NOTE — BRIEF OPERATIVE NOTE - NSICDXBRIEFPOSTOP_GEN_ALL_CORE_FT
POST-OP DIAGNOSIS:  Hydronephrosis, right 04-Dec-2023 16:45:52  Louis Malone  Right ureteral stone 04-Dec-2023 16:46:09  Louis Malone  Renal stones 04-Dec-2023 16:46:19  Louis Malone

## 2023-12-04 NOTE — PRE-OP CHECKLIST - IDENTIFICATION BAND VERIFIED
Refill Request  Venlafaxine XR (Effexor XR) 75 mg 24 hr capsule  #90  3 Refill  Last Refill  8/1/22    LOV 8/1/22  NOV 8/1/23    OK To Refill?       done

## 2023-12-04 NOTE — PROGRESS NOTE ADULT - ASSESSMENT
68 years old male with h/o HTN, HLD, nephrolithiasis present to ED with complain of severe right flank pain radiate to right groin, associated with nausea and vomiting. Patient had ? lithotripsy procedure at [facility] yesterday. No fever or chills.   Hemodynamically stable, afebrile, sat well at RA. No leukocytosis, plt 214, K 3.6, Cr 1.2. CT abd/pelvis with 4mm calculus in the proximal right ureter. 3 mm calculus in the distal right ureter. 4 mm calculus at the right UVJ. Associated mild right hydroureteronephrosis. Small nonobstructive calculi in the right kidney and right renal pelvis measuring up to 9 mm.      Problem/Plan - 1:  ·  Severe right flank pain with multiple small right ureteral obstructive stones and associated right hydronephrosis and azotemia. patient s/p lithotripsy on 11/30/23. + hematuria (now hematuria has resolved).   cont iv dilaudid for severe pain and monitor for any oversedation.   cont IVF and flomax.   no evidence of any infection at this time. No antibiotics at this time. Discussed with urology consult service>> NPO for cystoscopy with stent placement and stone extraction. Follow post procedure recs      Problem/Plan - 4:  ·  Problem: Benign essential HTN.   · controlled. cont to monitor.     Problem/Plan - 5:  ·  Problem: Hyperlipidemia, unspecified.   ·  Plan: on atorvastatin 20mg hs.    Anemia. normocytic. not blood loss.     Overweight. diet and exercise.     ERNESTO. Not much change in last 24 hours. Trend.    Hypokalemia: Replete and recheck     Full Code  SCDs    Time spent by me managing the patient is 54 mins  68 years old male with h/o HTN, HLD, nephrolithiasis present to ED with complain of severe right flank pain radiate to right groin, associated with nausea and vomiting. Patient had ? lithotripsy procedure at [facility] yesterday. No fever or chills.   Hemodynamically stable, afebrile, sat well at RA. No leukocytosis, plt 214, K 3.6, Cr 1.2. CT abd/pelvis with 4mm calculus in the proximal right ureter. 3 mm calculus in the distal right ureter. 4 mm calculus at the right UVJ. Associated mild right hydroureteronephrosis. Small nonobstructive calculi in the right kidney and right renal pelvis measuring up to 9 mm.      Problem/Plan - 1:  ·  Severe right flank pain with multiple small right ureteral obstructive stones and associated right hydronephrosis and azotemia. patient s/p lithotripsy on 11/30/23. + hematuria (now hematuria has resolved).   cont iv dilaudid for severe pain and monitor for any oversedation.   cont IVF and flomax.   no evidence of any infection at this time. No antibiotics at this time. Discussed with urology consult service>> NPO for cystoscopy with stent placement and stone extraction. Follow post procedure recs      Problem/Plan - 4:  ·  Problem: Benign essential HTN.   · controlled. cont to monitor.     Problem/Plan - 5:  ·  Problem: Hyperlipidemia, unspecified.   ·  Plan: on atorvastatin 20mg hs.    Anemia. normocytic. not blood loss.     Overweight. diet and exercise.     ERNESTO. Not much change in last 24 hours. Trend.    Hypokalemia: Replete and recheck     Full Code  SCDs    Time spent by me managing the patient is 54 mins

## 2023-12-04 NOTE — PROGRESS NOTE ADULT - SUBJECTIVE AND OBJECTIVE BOX
PRE OPERATIVE NOTE    Pre-op Diagnosis: hydronephrosis, nephrolithiasis  Procedure: Cysto, right stent placement possible stone extraction  Surgeon: Faisal                          12.1   5.79  )-----------( 211      ( 04 Dec 2023 06:35 )             35.1     12-04    138  |  105  |  14  ----------------------------<  112<H>  3.5   |  27  |  1.35<H>    Ca    8.8      04 Dec 2023 06:35  Phos  3.8     12-04  Mg     2.0     12-04        PT/INR - ( 04 Dec 2023 06:35 )   PT: 12.6 sec;   INR: 1.06 ratio         PTT - ( 04 Dec 2023 06:35 )  PTT:29.6 sec    CAPILLARY BLOOD GLUCOSE          Type & Screen: in Lab  CXR:< from: Xray Chest 2 Views PA/Lat (05.10.23 @ 16:57) >  IMPRESSION: Clear lungs with no acute cardiopulmonary abnormalities.    A/P: 68y Male planned for above procedure  NPO past midnight, except medications  IVF  Pain/nausea control  Consent in chart  amLODIPine   Tablet  valsartan

## 2023-12-04 NOTE — PRE-OP CHECKLIST - RESPIRATORY RATE (BREATHS/MIN)
Patient Education     Epidermoid Cyst (Sebaceous Cyst), No Infection  An epidermoid cyst (sebaceous cyst) is a term that refers to 2 similar types of cysts: those found in the skin (epidermoid), and those found around hair follicles (pilar).  Some general facts about these cysts:    A cyst is a sac filled with material that is often cheesy, fatty, oily, or fibrous. The material in them can be thick (like cottage cheese) or liquid.    They form slowly under the skin, and can be found on most parts of the body. They are most often found in hairier areas like the scalp, face, upper back, and genitals.    You can usually move them slightly if you try.    They can be smaller than a pea or as large as a few inches.    They are usually not painful, unless they become inflamed or infected.    The area around the cyst may smell bad. If the cyst breaks open, the material inside it often smells bad too.  Causes  Epidermoid cysts are caused when skin (epidermal) cells move under the skin surface, or are covered over by it. These cells continue to multiply, like skin does normally. They then form a wall around themselves (cyst) and secrete normal skin fluids (keratin). This may be developmental. But it often happens because of an injury to the skin.  Epidermoid cysts are often found around hair follicles. These follicles are like cysts, but they have openings. Normal lubricating oils for your hair are sent out through these openings. A cyst occurs when an opening becomes blocked or the site inflamed. This often occurs when there is damage to the hair follicles by a scrape or wound.    Pilar cysts are similar to epidermoid cysts. But they start from a different part of the hair follicle, and are more likely to be on the scalp.  Symptoms    Feeling a lump just beneath the skin    It may or may not be painful    The cyst may or may not smell bad    The cyst may become inflamed or red    The cyst may leak fluid or thick  material  Home care  Epidermoid cysts often go away without any treatment. If your cyst doesn t go away, and it bothers you, it may be drained or removed. If the cyst drains on its own, it may return. Resist the temptation to squeeze, pop, stick a needle in it, or cut it open. This often leads to an infection and scarring. If it gets severely inflamed or infected, you should seek medical care. Be sure to clean the cyst area when bathing or showering. Watch for the signs of infection listed below.  Follow-up care  Follow up with your healthcare provider, or as advised.  When to seek medical advice  Call your healthcare provider right away if any of these occur:    Swelling, redness, or pain    Pus coming from the cyst  Date Last Reviewed: 8/1/2016 2000-2019 [employer]. [address] [place] [address], [place] [address]. All rights reserved. This information is not intended as a substitute for professional medical care. Always follow your healthcare professional's instructions.            18

## 2023-12-05 ENCOUNTER — TRANSCRIPTION ENCOUNTER (OUTPATIENT)
Age: 68
End: 2023-12-05

## 2023-12-05 VITALS
HEART RATE: 71 BPM | OXYGEN SATURATION: 97 % | RESPIRATION RATE: 18 BRPM | TEMPERATURE: 98 F | SYSTOLIC BLOOD PRESSURE: 139 MMHG | DIASTOLIC BLOOD PRESSURE: 76 MMHG

## 2023-12-05 PROCEDURE — 99239 HOSP IP/OBS DSCHRG MGMT >30: CPT

## 2023-12-05 RX ORDER — TAMSULOSIN HYDROCHLORIDE 0.4 MG/1
1 CAPSULE ORAL
Qty: 0 | Refills: 0 | DISCHARGE
Start: 2023-12-05

## 2023-12-05 RX ORDER — CEFDINIR 250 MG/5ML
1 POWDER, FOR SUSPENSION ORAL
Qty: 4 | Refills: 0
Start: 2023-12-05 | End: 2023-12-06

## 2023-12-05 RX ADMIN — AMLODIPINE BESYLATE 10 MILLIGRAM(S): 2.5 TABLET ORAL at 06:05

## 2023-12-05 RX ADMIN — Medication 1 APPLICATION(S): at 06:05

## 2023-12-05 RX ADMIN — VALSARTAN 320 MILLIGRAM(S): 80 TABLET ORAL at 06:05

## 2023-12-05 RX ADMIN — SODIUM CHLORIDE 120 MILLILITER(S): 9 INJECTION, SOLUTION INTRAVENOUS at 06:06

## 2023-12-05 NOTE — DISCHARGE NOTE PROVIDER - CARE PROVIDERS DIRECT ADDRESSES
,DirectAddress_Unknown,[email or web address] ,DirectAddress_Unknown,[email or web address]

## 2023-12-05 NOTE — DISCHARGE NOTE PROVIDER - HOSPITAL COURSE
68 years old male with h/o HTN, HLD, nephrolithiasis present to ED with complain of severe right flank pain radiate to right groin, associated with nausea and vomiting. Patient had ? lithotripsy procedure at [facility] yesterday. No fever or chills.   Hemodynamically stable, afebrile, sat well at RA. No leukocytosis, plt 214, K 3.6, Cr 1.2. CT abd/pelvis with 4mm calculus in the proximal right ureter. 3 mm calculus in the distal right ureter. 4 mm calculus at the right UVJ. Associated mild right hydroureteronephrosis. Small nonobstructive calculi in the right kidney and right renal pelvis measuring up to 9 mm.    Course:     Problem/Plan - 1:  ·  Severe right flank pain with multiple small right ureteral obstructive stones and associated right hydronephrosis and azotemia. patient s/p lithotripsy on 11/30/23. + hematuria  s/p cystoscopy with stent placement and stone extraction.   no abd pain  improving hematuria. Burt out. Discussed with Dr Malone>>Ok to go home and outpatient follow up with him in 2-3 weeks      Problem/Plan - 4:  ·  Problem: Benign essential HTN.   · controlled. cont to monitor.     Problem/Plan - 5:  ·  Problem: Hyperlipidemia, unspecified.   ·  Plan: on atorvastatin 20mg hs.    Anemia. normocytic. not blood loss.     Overweight. diet and exercise.     ERNESTO. stable Creatinine. Outpatient PCP follow up.     Hypokalemia: Repleted    Seen and examined by me today. Vitals stable.   I have discussed all the inpatient radiographic findings with the patient and stressed that patient follows with the PCP for further outpatient care. I have educated patient to use [facility] [employer] [facility] patient portal (as instructed on the discharge paperwork) to access medical records outside the hospital.   All questions welcomed and answered appropriately. Patient verbalized understanding of post discharge physician's follows up and discharge instructions.   DC time spent by me face to face excluding billable procedures 38 mins 68 years old male with h/o HTN, HLD, nephrolithiasis present to ED with complain of severe right flank pain radiate to right groin, associated with nausea and vomiting. Patient had ? lithotripsy procedure at [facility] yesterday. No fever or chills.   Hemodynamically stable, afebrile, sat well at RA. No leukocytosis, plt 214, K 3.6, Cr 1.2. CT abd/pelvis with 4mm calculus in the proximal right ureter. 3 mm calculus in the distal right ureter. 4 mm calculus at the right UVJ. Associated mild right hydroureteronephrosis. Small nonobstructive calculi in the right kidney and right renal pelvis measuring up to 9 mm.    Course:     Problem/Plan - 1:  ·  Severe right flank pain with multiple small right ureteral obstructive stones and associated right hydronephrosis and azotemia. patient s/p lithotripsy on 11/30/23. + hematuria  s/p cystoscopy with stent placement and stone extraction.   no abd pain  improving hematuria. Burt out. Discussed with Dr Malone>>Ok to go home and outpatient follow up with him in 2-3 weeks      Problem/Plan - 4:  ·  Problem: Benign essential HTN.   · controlled. cont to monitor.     Problem/Plan - 5:  ·  Problem: Hyperlipidemia, unspecified.   ·  Plan: on atorvastatin 20mg hs.    Anemia. normocytic. not blood loss.     Overweight. diet and exercise.     ERNESTO. stable Creatinine. Outpatient PCP follow up.     Hypokalemia: Repleted    Seen and examined by me today. Vitals stable.   I have discussed all the inpatient radiographic findings with the patient and stressed that patient follows with the PCP for further outpatient care. I have educated patient to use [facility] patient portal (as instructed on the discharge paperwork) to access medical records outside the hospital.   All questions welcomed and answered appropriately. Patient verbalized understanding of post discharge physician's follows up and discharge instructions.   DC time spent by me face to face excluding billable procedures 38 mins

## 2023-12-05 NOTE — DISCHARGE NOTE PROVIDER - NSDCFUADDINST_GEN_ALL_CORE_FT
1) It is important to see your primary physician as well as other necessary consultants within next 7-10 days to perform a comprehensive medical review.  Call their offices for an appointment as soon as you leave the hospital.  If you do not have a primary physician or unable to reach your PCP, contact the [facility] Physician Referral Service at [phone].  Your medical issues appear to be stable at this time, but if your symptoms recur or worsen, contact your physicians and/or return to the hospital if necessary.  If you encounter any issues or questions with your medication, call your physicians before stopping the medication.    2) Please access [facility] Patient portal (as instructed on the discharge paperwork) to access your medical records at any time after discharge.  1) It is important to see your primary physician as well as other necessary consultants within next 7-10 days to perform a comprehensive medical review.  Call their offices for an appointment as soon as you leave the hospital.  If you do not have a primary physician or unable to reach your PCP, contact the [facility] Physician Referral Service at [phone].  Your medical issues appear to be stable at this time, but if your symptoms recur or worsen, contact your physicians and/or return to the hospital if necessary.  If you encounter any issues or questions with your medication, call your physicians before stopping the medication.    2) Please access [facility] Patient portal (as instructed on the discharge paperwork) to access your medical records at any time after discharge.

## 2023-12-05 NOTE — DISCHARGE NOTE PROVIDER - CARE PROVIDER_API CALL
Your PCP in a  week,   Phone: (   )    -  Fax: (   )    -  Follow Up Time:     Louis Malone  Urology  [address] [place] [address]  Phone: [phone]  Fax: [phone]  Follow Up Time: 2 weeks   Your PCP in a  week,   Phone: (   )    -  Fax: (   )    -  Follow Up Time:     Louis Malone  Urology  [address] [place] [address]  Phone: [phone]  Fax: [phone]  Follow Up Time: 2 weeks

## 2023-12-05 NOTE — DISCHARGE NOTE NURSING/CASE MANAGEMENT/SOCIAL WORK - PATIENT PORTAL LINK FT
You can access the FollowMyHealth Patient Portal offered by [facility] by registering at the following website: [email or web address]. By joining [employer]’s FollowMyHealth portal, you will also be able to view your health information using other applications (apps) compatible with our system. You can access the FollowMyHealth Patient Portal offered by [facility] by registering at the following website: [email or web address]. By joining [employer]’s FollowMyHealth portal, you will also be able to view your health information using other applications (apps) compatible with our system.

## 2023-12-05 NOTE — DISCHARGE NOTE PROVIDER - NSDCMRMEDTOKEN_GEN_ALL_CORE_FT
amLODIPine 10 mg oral tablet: 1 tab(s) orally once a day  atorvastatin 40 mg oral tablet: 1 tab(s) orally once a day (at bedtime)  cefdinir 300 mg oral capsule: 1 cap(s) orally 2 times a day  hydroCHLOROthiazide 12.5 mg oral capsule: 1 cap(s) orally once a day  tamsulosin 0.4 mg oral capsule: 1 cap(s) orally once a day (at bedtime)  valsartan 320 mg oral tablet: 1 tab(s) orally once a day

## 2023-12-05 NOTE — CHART NOTE - NSCHARTNOTEFT_GEN_A_CORE
Work Letter     To Whom It May Concern,    Mr Johan Chavez was admitted on 12/1/2023 and was discharged from [facility] on 12/05/2023. Patient may return to work pending further clearance from Urologist.  Any further questions or concerns please use contact info below.      Robbie Aiken PA-C  Internal Medicine  [address] [place] [id number]  [phone] Work Letter     To Whom It May Concern,    Mr Johan Chavez was admitted on 12/1/2023 and was discharged from [facility] on 12/05/2023. Patient may return to work pending further clearance from Urologist.  Any further questions or concerns please use contact info below.      Robbie Aiken PA-C  Internal Medicine  [address] [place] [id number]  [phone]

## 2023-12-05 NOTE — DISCHARGE NOTE PROVIDER - PROVIDER TOKENS
FREE:[LAST:[Your PCP in a  week],PHONE:[(   )    -],FAX:[(   )    -]],PROVIDER:[TOKEN:[[id number]],FOLLOWUP:[2 weeks]] FREE:[LAST:[Your PCP in a  week],PHONE:[(   )    -],FAX:[(   )    -]],PROVIDER:[TOKEN:[[id number]],FOLLOWUP:[2 weeks]]

## 2023-12-05 NOTE — DISCHARGE NOTE NURSING/CASE MANAGEMENT/SOCIAL WORK - NSDCVIVACCINE_GEN_ALL_CORE_FT
Tdap; 09-May-2019 17:12; Jeannie Matthews (RN); [employer]; [id number] (Exp. Date: 26-Feb-2021); IntraMuscular; Deltoid Left.; 0.5 milliLiter(s); VIS (VIS Published: 09-May-2013, VIS Presented: 09-May-2019);    Tdap; 09-May-2019 17:12; Jeannie Matthews (RN); [employer]; [id number] (Exp. Date: 26-Feb-2021); IntraMuscular; Deltoid Left.; 0.5 milliLiter(s); VIS (VIS Published: 09-May-2013, VIS Presented: 09-May-2019);

## 2023-12-05 NOTE — DISCHARGE NOTE NURSING/CASE MANAGEMENT/SOCIAL WORK - NSDCPEFALRISK_GEN_ALL_CORE
For information on Fall & Injury Prevention, visit: [email or web address] OR  [email or web address] OR  https://www.cdc.gov/steadi/patient.html For information on Fall & Injury Prevention, visit: [email or web address] OR  [email or web address] OR  https://www.cdc.gov/steadi/patient.html

## 2023-12-06 LAB
CULTURE RESULTS: NO GROWTH — SIGNIFICANT CHANGE UP
CULTURE RESULTS: NO GROWTH — SIGNIFICANT CHANGE UP
SPECIMEN SOURCE: SIGNIFICANT CHANGE UP
SPECIMEN SOURCE: SIGNIFICANT CHANGE UP
SURGICAL PATHOLOGY STUDY: SIGNIFICANT CHANGE UP

## 2023-12-08 DIAGNOSIS — N17.9 ACUTE KIDNEY FAILURE, UNSPECIFIED: ICD-10-CM

## 2023-12-08 DIAGNOSIS — N23 UNSPECIFIED RENAL COLIC: ICD-10-CM

## 2023-12-08 DIAGNOSIS — E66.3 OVERWEIGHT: ICD-10-CM

## 2023-12-08 DIAGNOSIS — D64.9 ANEMIA, UNSPECIFIED: ICD-10-CM

## 2023-12-08 DIAGNOSIS — N13.2 HYDRONEPHROSIS WITH RENAL AND URETERAL CALCULOUS OBSTRUCTION: ICD-10-CM

## 2023-12-08 DIAGNOSIS — E78.5 HYPERLIPIDEMIA, UNSPECIFIED: ICD-10-CM

## 2023-12-08 DIAGNOSIS — E87.6 HYPOKALEMIA: ICD-10-CM

## 2023-12-08 DIAGNOSIS — R60.0 LOCALIZED EDEMA: ICD-10-CM
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
## 2023-12-08 DIAGNOSIS — Z87.898 PERSONAL HISTORY OF OTHER SPECIFIED CONDITIONS: ICD-10-CM

## 2023-12-08 DIAGNOSIS — I10 ESSENTIAL (PRIMARY) HYPERTENSION: ICD-10-CM

## 2023-12-08 DIAGNOSIS — T80.89XA OTHER COMPLICATIONS FOLLOWING INFUSION, TRANSFUSION AND THERAPEUTIC INJECTION, INITIAL ENCOUNTER: ICD-10-CM

## 2023-12-08 DIAGNOSIS — N35.914 UNSPECIFIED ANTERIOR URETHRAL STRICTURE, MALE: ICD-10-CM

## 2023-12-08 DIAGNOSIS — L98.8 OTHER SPECIFIED DISORDERS OF THE SKIN AND SUBCUTANEOUS TISSUE: ICD-10-CM
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
## 2023-12-08 DIAGNOSIS — N32.89 OTHER SPECIFIED DISORDERS OF BLADDER: ICD-10-CM

## 2023-12-08 DIAGNOSIS — Z96.641 PRESENCE OF RIGHT ARTIFICIAL HIP JOINT: ICD-10-CM

## 2023-12-08 DIAGNOSIS — Z98.890 OTHER SPECIFIED POSTPROCEDURAL STATES: ICD-10-CM

## 2023-12-11 LAB
CELL MATERIAL STONE EST-MCNT: SIGNIFICANT CHANGE UP
CELL MATERIAL STONE EST-MCNT: SIGNIFICANT CHANGE UP
LABORATORY COMMENT REPORT: SIGNIFICANT CHANGE UP
LABORATORY COMMENT REPORT: SIGNIFICANT CHANGE UP
NIDUS STONE QN: SIGNIFICANT CHANGE UP
NIDUS STONE QN: SIGNIFICANT CHANGE UP

## 2023-12-26 ENCOUNTER — LABORATORY RESULT (OUTPATIENT)
Age: 68
End: 2023-12-26

## 2023-12-26 ENCOUNTER — APPOINTMENT (OUTPATIENT)
Dept: UROLOGY | Facility: CLINIC | Age: 68
End: 2023-12-26
Payer: MEDICARE

## 2023-12-26 VITALS
SYSTOLIC BLOOD PRESSURE: 125 MMHG | WEIGHT: 220 LBS | HEIGHT: 77 IN | TEMPERATURE: 97.3 F | OXYGEN SATURATION: 98 % | BODY MASS INDEX: 25.98 KG/M2 | HEART RATE: 74 BPM | DIASTOLIC BLOOD PRESSURE: 78 MMHG

## 2023-12-26 DIAGNOSIS — I25.10 ATHEROSCLEROTIC HEART DISEASE OF NATIVE CORONARY ARTERY W/OUT ANGINA PECTORIS: ICD-10-CM

## 2023-12-26 PROCEDURE — 99214 OFFICE O/P EST MOD 30 MIN: CPT

## 2023-12-26 NOTE — HISTORY OF PRESENT ILLNESS
[FreeTextEntry1] : 12/26/2023: Mr. SALDAÑA is a 68 year male who presents today for multiple right ureteral stones. S/P ESWL of right renal pelvic stone.   ESWL of right renal pelvic stone subsequently multiple ureteral stones in right ureteral. On 12/5/23 had ureteral lithotripsy and insertion of stent. More stones were present and will require ureteroscopy to remove these stones.   Patient marked urgency and frequency secondary to right ureteral stent.  since Sunday, Duran fever. Burning and frequent urination is most likely secondary to stent. Will collect urine for Urinalysis and Culture. Will collect urine to r/o infection. Will get UA/Culture.  Recommended to continue taking pain medication, and if worsens to go to the ER.   O/E: circumcised phallus, adequate meatus, both testes descended, nontender, no mass palpable.  will schedule for right ureterolithotripsy and removal of stones.

## 2023-12-26 NOTE — PHYSICAL EXAM
[Normal Appearance] : normal appearance [Well Groomed] : well groomed [General Appearance - In No Acute Distress] : no acute distress [Edema] : no peripheral edema [Respiration, Rhythm And Depth] : normal respiratory rhythm and effort [Exaggerated Use Of Accessory Muscles For Inspiration] : no accessory muscle use [Abdomen Soft] : soft [Abdomen Tenderness] : non-tender [Costovertebral Angle Tenderness] : no ~M costovertebral angle tenderness [Urethral Meatus] : meatus normal [Penis Abnormality] : normal circumcised penis [Urinary Bladder Findings] : the bladder was normal on palpation [Scrotum] : the scrotum was normal [Rectal Exam - Seminal Vesicles] : the seminal vesicles were normal [Epididymis] : the epididymides were normal [Testes Tenderness] : no tenderness of the testes [Testes Mass (___cm)] : there were no testicular masses [Normal Station and Gait] : the gait and station were normal for the patient's age [] : no rash [No Focal Deficits] : no focal deficits [Oriented To Time, Place, And Person] : oriented to person, place, and time [Affect] : the affect was normal [Mood] : the mood was normal [No Palpable Adenopathy] : no palpable adenopathy

## 2023-12-26 NOTE — LETTER BODY
[Dear  ___] : Dear  [unfilled], [Consult Letter:] : I had the pleasure of evaluating your patient, [unfilled]. [Please see my note below.] : Please see my note below. [Consult Closing:] : Thank you very much for allowing me to participate in the care of this patient.  If you have any questions, please do not hesitate to contact me. [Sincerely,] : Sincerely, [FreeTextEntry3] : Louis Malone MD

## 2023-12-26 NOTE — REVIEW OF SYSTEMS
[Chills] : chills [Poor quality erections] : Poor quality erections [Negative] : Heme/Lymph [see HPI] : see HPI

## 2023-12-27 LAB
APPEARANCE: ABNORMAL
BILIRUBIN URINE: NEGATIVE
BLOOD URINE: ABNORMAL
COLOR: NORMAL
GLUCOSE QUALITATIVE U: NEGATIVE MG/DL
KETONES URINE: NEGATIVE MG/DL
LEUKOCYTE ESTERASE URINE: ABNORMAL
NITRITE URINE: POSITIVE
PH URINE: 6
PROTEIN URINE: 300 MG/DL
SPECIFIC GRAVITY URINE: 1.02
UROBILINOGEN URINE: 0.2 MG/DL

## 2023-12-28 ENCOUNTER — INPATIENT (INPATIENT)
Facility: HOSPITAL | Age: 68
LOS: 2 days | Discharge: HOME | End: 2023-12-31
Attending: STUDENT IN AN ORGANIZED HEALTH CARE EDUCATION/TRAINING PROGRAM | Admitting: STUDENT IN AN ORGANIZED HEALTH CARE EDUCATION/TRAINING PROGRAM
Payer: MEDICARE

## 2023-12-28 ENCOUNTER — TRANSCRIPTION ENCOUNTER (OUTPATIENT)
Age: 68
End: 2023-12-28

## 2023-12-28 VITALS
HEART RATE: 77 BPM | OXYGEN SATURATION: 98 % | RESPIRATION RATE: 20 BRPM | HEIGHT: 77 IN | SYSTOLIC BLOOD PRESSURE: 123 MMHG | DIASTOLIC BLOOD PRESSURE: 79 MMHG | WEIGHT: 225.09 LBS | TEMPERATURE: 98 F

## 2023-12-28 DIAGNOSIS — Z96.641 PRESENCE OF RIGHT ARTIFICIAL HIP JOINT: Chronic | ICD-10-CM

## 2023-12-28 LAB
ALBUMIN SERPL ELPH-MCNC: 3 G/DL — LOW (ref 3.3–5)
ALBUMIN SERPL ELPH-MCNC: 3 G/DL — LOW (ref 3.3–5)
ALP SERPL-CCNC: 115 U/L — SIGNIFICANT CHANGE UP (ref 40–120)
ALP SERPL-CCNC: 115 U/L — SIGNIFICANT CHANGE UP (ref 40–120)
ALT FLD-CCNC: 69 U/L — SIGNIFICANT CHANGE UP (ref 12–78)
ALT FLD-CCNC: 69 U/L — SIGNIFICANT CHANGE UP (ref 12–78)
ANION GAP SERPL CALC-SCNC: 6 MMOL/L — SIGNIFICANT CHANGE UP (ref 5–17)
ANION GAP SERPL CALC-SCNC: 6 MMOL/L — SIGNIFICANT CHANGE UP (ref 5–17)
APPEARANCE UR: ABNORMAL
APPEARANCE UR: ABNORMAL
AST SERPL-CCNC: 67 U/L — HIGH (ref 15–37)
AST SERPL-CCNC: 67 U/L — HIGH (ref 15–37)
BACTERIA # UR AUTO: ABNORMAL /HPF
BACTERIA # UR AUTO: ABNORMAL /HPF
BASOPHILS # BLD AUTO: 0.04 K/UL — SIGNIFICANT CHANGE UP (ref 0–0.2)
BASOPHILS # BLD AUTO: 0.04 K/UL — SIGNIFICANT CHANGE UP (ref 0–0.2)
BASOPHILS NFR BLD AUTO: 0.8 % — SIGNIFICANT CHANGE UP (ref 0–2)
BASOPHILS NFR BLD AUTO: 0.8 % — SIGNIFICANT CHANGE UP (ref 0–2)
BILIRUB SERPL-MCNC: 0.4 MG/DL — SIGNIFICANT CHANGE UP (ref 0.2–1.2)
BILIRUB SERPL-MCNC: 0.4 MG/DL — SIGNIFICANT CHANGE UP (ref 0.2–1.2)
BILIRUB UR-MCNC: NEGATIVE — SIGNIFICANT CHANGE UP
BILIRUB UR-MCNC: NEGATIVE — SIGNIFICANT CHANGE UP
BUN SERPL-MCNC: 18 MG/DL — SIGNIFICANT CHANGE UP (ref 7–23)
BUN SERPL-MCNC: 18 MG/DL — SIGNIFICANT CHANGE UP (ref 7–23)
CALCIUM SERPL-MCNC: 9.3 MG/DL — SIGNIFICANT CHANGE UP (ref 8.5–10.1)
CALCIUM SERPL-MCNC: 9.3 MG/DL — SIGNIFICANT CHANGE UP (ref 8.5–10.1)
CHLORIDE SERPL-SCNC: 107 MMOL/L — SIGNIFICANT CHANGE UP (ref 96–108)
CHLORIDE SERPL-SCNC: 107 MMOL/L — SIGNIFICANT CHANGE UP (ref 96–108)
CO2 SERPL-SCNC: 28 MMOL/L — SIGNIFICANT CHANGE UP (ref 22–31)
CO2 SERPL-SCNC: 28 MMOL/L — SIGNIFICANT CHANGE UP (ref 22–31)
COLOR SPEC: YELLOW — SIGNIFICANT CHANGE UP
COLOR SPEC: YELLOW — SIGNIFICANT CHANGE UP
CREAT SERPL-MCNC: 0.92 MG/DL — SIGNIFICANT CHANGE UP (ref 0.5–1.3)
CREAT SERPL-MCNC: 0.92 MG/DL — SIGNIFICANT CHANGE UP (ref 0.5–1.3)
DIFF PNL FLD: ABNORMAL
DIFF PNL FLD: ABNORMAL
EGFR: 92 ML/MIN/1.73M2 — SIGNIFICANT CHANGE UP
EGFR: 92 ML/MIN/1.73M2 — SIGNIFICANT CHANGE UP
EOSINOPHIL # BLD AUTO: 0.29 K/UL — SIGNIFICANT CHANGE UP (ref 0–0.5)
EOSINOPHIL # BLD AUTO: 0.29 K/UL — SIGNIFICANT CHANGE UP (ref 0–0.5)
EOSINOPHIL NFR BLD AUTO: 5.8 % — SIGNIFICANT CHANGE UP (ref 0–6)
EOSINOPHIL NFR BLD AUTO: 5.8 % — SIGNIFICANT CHANGE UP (ref 0–6)
EPI CELLS # UR: PRESENT
EPI CELLS # UR: PRESENT
GLUCOSE SERPL-MCNC: 109 MG/DL — HIGH (ref 70–99)
GLUCOSE SERPL-MCNC: 109 MG/DL — HIGH (ref 70–99)
GLUCOSE UR QL: NEGATIVE MG/DL — SIGNIFICANT CHANGE UP
GLUCOSE UR QL: NEGATIVE MG/DL — SIGNIFICANT CHANGE UP
HCT VFR BLD CALC: 36.6 % — LOW (ref 39–50)
HCT VFR BLD CALC: 36.6 % — LOW (ref 39–50)
HGB BLD-MCNC: 12.4 G/DL — LOW (ref 13–17)
HGB BLD-MCNC: 12.4 G/DL — LOW (ref 13–17)
IMM GRANULOCYTES NFR BLD AUTO: 0.2 % — SIGNIFICANT CHANGE UP (ref 0–0.9)
IMM GRANULOCYTES NFR BLD AUTO: 0.2 % — SIGNIFICANT CHANGE UP (ref 0–0.9)
KETONES UR-MCNC: NEGATIVE MG/DL — SIGNIFICANT CHANGE UP
KETONES UR-MCNC: NEGATIVE MG/DL — SIGNIFICANT CHANGE UP
LACTATE SERPL-SCNC: 1.5 MMOL/L — SIGNIFICANT CHANGE UP (ref 0.7–2)
LACTATE SERPL-SCNC: 1.5 MMOL/L — SIGNIFICANT CHANGE UP (ref 0.7–2)
LEUKOCYTE ESTERASE UR-ACNC: ABNORMAL
LEUKOCYTE ESTERASE UR-ACNC: ABNORMAL
LIDOCAIN IGE QN: 23 U/L — SIGNIFICANT CHANGE UP (ref 13–75)
LIDOCAIN IGE QN: 23 U/L — SIGNIFICANT CHANGE UP (ref 13–75)
LYMPHOCYTES # BLD AUTO: 1.54 K/UL — SIGNIFICANT CHANGE UP (ref 1–3.3)
LYMPHOCYTES # BLD AUTO: 1.54 K/UL — SIGNIFICANT CHANGE UP (ref 1–3.3)
LYMPHOCYTES # BLD AUTO: 30.6 % — SIGNIFICANT CHANGE UP (ref 13–44)
LYMPHOCYTES # BLD AUTO: 30.6 % — SIGNIFICANT CHANGE UP (ref 13–44)
MCHC RBC-ENTMCNC: 28.8 PG — SIGNIFICANT CHANGE UP (ref 27–34)
MCHC RBC-ENTMCNC: 28.8 PG — SIGNIFICANT CHANGE UP (ref 27–34)
MCHC RBC-ENTMCNC: 33.9 G/DL — SIGNIFICANT CHANGE UP (ref 32–36)
MCHC RBC-ENTMCNC: 33.9 G/DL — SIGNIFICANT CHANGE UP (ref 32–36)
MCV RBC AUTO: 85.1 FL — SIGNIFICANT CHANGE UP (ref 80–100)
MCV RBC AUTO: 85.1 FL — SIGNIFICANT CHANGE UP (ref 80–100)
MONOCYTES # BLD AUTO: 0.61 K/UL — SIGNIFICANT CHANGE UP (ref 0–0.9)
MONOCYTES # BLD AUTO: 0.61 K/UL — SIGNIFICANT CHANGE UP (ref 0–0.9)
MONOCYTES NFR BLD AUTO: 12.1 % — SIGNIFICANT CHANGE UP (ref 2–14)
MONOCYTES NFR BLD AUTO: 12.1 % — SIGNIFICANT CHANGE UP (ref 2–14)
NEUTROPHILS # BLD AUTO: 2.54 K/UL — SIGNIFICANT CHANGE UP (ref 1.8–7.4)
NEUTROPHILS # BLD AUTO: 2.54 K/UL — SIGNIFICANT CHANGE UP (ref 1.8–7.4)
NEUTROPHILS NFR BLD AUTO: 50.5 % — SIGNIFICANT CHANGE UP (ref 43–77)
NEUTROPHILS NFR BLD AUTO: 50.5 % — SIGNIFICANT CHANGE UP (ref 43–77)
NITRITE UR-MCNC: NEGATIVE — SIGNIFICANT CHANGE UP
NITRITE UR-MCNC: NEGATIVE — SIGNIFICANT CHANGE UP
NRBC # BLD: 0 /100 WBCS — SIGNIFICANT CHANGE UP (ref 0–0)
NRBC # BLD: 0 /100 WBCS — SIGNIFICANT CHANGE UP (ref 0–0)
PH UR: 6 — SIGNIFICANT CHANGE UP (ref 5–8)
PH UR: 6 — SIGNIFICANT CHANGE UP (ref 5–8)
PLATELET # BLD AUTO: 259 K/UL — SIGNIFICANT CHANGE UP (ref 150–400)
PLATELET # BLD AUTO: 259 K/UL — SIGNIFICANT CHANGE UP (ref 150–400)
POTASSIUM SERPL-MCNC: 5 MMOL/L — SIGNIFICANT CHANGE UP (ref 3.5–5.3)
POTASSIUM SERPL-MCNC: 5 MMOL/L — SIGNIFICANT CHANGE UP (ref 3.5–5.3)
POTASSIUM SERPL-SCNC: 5 MMOL/L — SIGNIFICANT CHANGE UP (ref 3.5–5.3)
POTASSIUM SERPL-SCNC: 5 MMOL/L — SIGNIFICANT CHANGE UP (ref 3.5–5.3)
PROT SERPL-MCNC: 7.3 GM/DL — SIGNIFICANT CHANGE UP (ref 6–8.3)
PROT SERPL-MCNC: 7.3 GM/DL — SIGNIFICANT CHANGE UP (ref 6–8.3)
PROT UR-MCNC: 300 MG/DL
PROT UR-MCNC: 300 MG/DL
RBC # BLD: 4.3 M/UL — SIGNIFICANT CHANGE UP (ref 4.2–5.8)
RBC # BLD: 4.3 M/UL — SIGNIFICANT CHANGE UP (ref 4.2–5.8)
RBC # FLD: 13 % — SIGNIFICANT CHANGE UP (ref 10.3–14.5)
RBC # FLD: 13 % — SIGNIFICANT CHANGE UP (ref 10.3–14.5)
RBC CASTS # UR COMP ASSIST: ABNORMAL /HPF
RBC CASTS # UR COMP ASSIST: ABNORMAL /HPF
SODIUM SERPL-SCNC: 141 MMOL/L — SIGNIFICANT CHANGE UP (ref 135–145)
SODIUM SERPL-SCNC: 141 MMOL/L — SIGNIFICANT CHANGE UP (ref 135–145)
SP GR SPEC: 1.01 — SIGNIFICANT CHANGE UP (ref 1–1.03)
SP GR SPEC: 1.01 — SIGNIFICANT CHANGE UP (ref 1–1.03)
UROBILINOGEN FLD QL: 0.2 MG/DL — SIGNIFICANT CHANGE UP (ref 0.2–1)
UROBILINOGEN FLD QL: 0.2 MG/DL — SIGNIFICANT CHANGE UP (ref 0.2–1)
WBC # BLD: 5.03 K/UL — SIGNIFICANT CHANGE UP (ref 3.8–10.5)
WBC # BLD: 5.03 K/UL — SIGNIFICANT CHANGE UP (ref 3.8–10.5)
WBC # FLD AUTO: 5.03 K/UL — SIGNIFICANT CHANGE UP (ref 3.8–10.5)
WBC # FLD AUTO: 5.03 K/UL — SIGNIFICANT CHANGE UP (ref 3.8–10.5)
WBC UR QL: SIGNIFICANT CHANGE UP /HPF (ref 0–5)
WBC UR QL: SIGNIFICANT CHANGE UP /HPF (ref 0–5)

## 2023-12-28 PROCEDURE — 99223 1ST HOSP IP/OBS HIGH 75: CPT

## 2023-12-28 PROCEDURE — 99285 EMERGENCY DEPT VISIT HI MDM: CPT

## 2023-12-28 PROCEDURE — 74176 CT ABD & PELVIS W/O CONTRAST: CPT | Mod: 26,MA

## 2023-12-28 PROCEDURE — 99222 1ST HOSP IP/OBS MODERATE 55: CPT

## 2023-12-28 PROCEDURE — 93010 ELECTROCARDIOGRAM REPORT: CPT

## 2023-12-28 RX ORDER — AMLODIPINE BESYLATE 2.5 MG/1
5 TABLET ORAL DAILY
Refills: 0 | Status: DISCONTINUED | OUTPATIENT
Start: 2023-12-28 | End: 2023-12-31

## 2023-12-28 RX ORDER — ATORVASTATIN CALCIUM 80 MG/1
40 TABLET, FILM COATED ORAL AT BEDTIME
Refills: 0 | Status: DISCONTINUED | OUTPATIENT
Start: 2023-12-28 | End: 2023-12-31

## 2023-12-28 RX ORDER — CEFTRIAXONE 500 MG/1
1000 INJECTION, POWDER, FOR SOLUTION INTRAMUSCULAR; INTRAVENOUS EVERY 24 HOURS
Refills: 0 | Status: COMPLETED | OUTPATIENT
Start: 2023-12-28 | End: 2023-12-30

## 2023-12-28 RX ORDER — HEPARIN SODIUM 5000 [USP'U]/ML
5000 INJECTION INTRAVENOUS; SUBCUTANEOUS EVERY 12 HOURS
Refills: 0 | Status: DISCONTINUED | OUTPATIENT
Start: 2023-12-28 | End: 2023-12-31

## 2023-12-28 RX ORDER — METOPROLOL TARTRATE 50 MG
25 TABLET ORAL DAILY
Refills: 0 | Status: DISCONTINUED | OUTPATIENT
Start: 2023-12-28 | End: 2023-12-31

## 2023-12-28 RX ORDER — KETOROLAC TROMETHAMINE 30 MG/ML
15 SYRINGE (ML) INJECTION ONCE
Refills: 0 | Status: DISCONTINUED | OUTPATIENT
Start: 2023-12-28 | End: 2023-12-28

## 2023-12-28 RX ORDER — ONDANSETRON 8 MG/1
4 TABLET, FILM COATED ORAL ONCE
Refills: 0 | Status: COMPLETED | OUTPATIENT
Start: 2023-12-28 | End: 2023-12-28

## 2023-12-28 RX ORDER — VALSARTAN 80 MG/1
320 TABLET ORAL DAILY
Refills: 0 | Status: DISCONTINUED | OUTPATIENT
Start: 2023-12-28 | End: 2023-12-28

## 2023-12-28 RX ORDER — VALSARTAN 80 MG/1
160 TABLET ORAL DAILY
Refills: 0 | Status: DISCONTINUED | OUTPATIENT
Start: 2023-12-28 | End: 2023-12-31

## 2023-12-28 RX ORDER — SODIUM CHLORIDE 9 MG/ML
1000 INJECTION, SOLUTION INTRAVENOUS
Refills: 0 | Status: DISCONTINUED | OUTPATIENT
Start: 2023-12-28 | End: 2023-12-31

## 2023-12-28 RX ORDER — MORPHINE SULFATE 50 MG/1
4 CAPSULE, EXTENDED RELEASE ORAL ONCE
Refills: 0 | Status: DISCONTINUED | OUTPATIENT
Start: 2023-12-28 | End: 2023-12-28

## 2023-12-28 RX ORDER — AMLODIPINE BESYLATE 2.5 MG/1
10 TABLET ORAL DAILY
Refills: 0 | Status: DISCONTINUED | OUTPATIENT
Start: 2023-12-28 | End: 2023-12-28

## 2023-12-28 RX ORDER — TAMSULOSIN HYDROCHLORIDE 0.4 MG/1
0.4 CAPSULE ORAL AT BEDTIME
Refills: 0 | Status: DISCONTINUED | OUTPATIENT
Start: 2023-12-28 | End: 2023-12-31

## 2023-12-28 RX ORDER — ACETAMINOPHEN 500 MG
1000 TABLET ORAL ONCE
Refills: 0 | Status: COMPLETED | OUTPATIENT
Start: 2023-12-28 | End: 2023-12-28

## 2023-12-28 RX ORDER — ACETAMINOPHEN 500 MG
650 TABLET ORAL ONCE
Refills: 0 | Status: COMPLETED | OUTPATIENT
Start: 2023-12-28 | End: 2023-12-28

## 2023-12-28 RX ORDER — AMLODIPINE BESYLATE 2.5 MG/1
10 TABLET ORAL ONCE
Refills: 0 | Status: DISCONTINUED | OUTPATIENT
Start: 2023-12-28 | End: 2023-12-28

## 2023-12-28 RX ADMIN — TAMSULOSIN HYDROCHLORIDE 0.4 MILLIGRAM(S): 0.4 CAPSULE ORAL at 22:11

## 2023-12-28 RX ADMIN — ONDANSETRON 4 MILLIGRAM(S): 8 TABLET, FILM COATED ORAL at 14:03

## 2023-12-28 RX ADMIN — Medication 15 MILLIGRAM(S): at 15:02

## 2023-12-28 RX ADMIN — MORPHINE SULFATE 4 MILLIGRAM(S): 50 CAPSULE, EXTENDED RELEASE ORAL at 14:04

## 2023-12-28 RX ADMIN — Medication 15 MILLIGRAM(S): at 14:04

## 2023-12-28 RX ADMIN — MORPHINE SULFATE 4 MILLIGRAM(S): 50 CAPSULE, EXTENDED RELEASE ORAL at 15:02

## 2023-12-28 RX ADMIN — CEFTRIAXONE 100 MILLIGRAM(S): 500 INJECTION, POWDER, FOR SOLUTION INTRAMUSCULAR; INTRAVENOUS at 17:59

## 2023-12-28 RX ADMIN — ATORVASTATIN CALCIUM 40 MILLIGRAM(S): 80 TABLET, FILM COATED ORAL at 22:11

## 2023-12-28 NOTE — ED PROVIDER NOTE - CLINICAL SUMMARY MEDICAL DECISION MAKING FREE TEXT BOX
68M pmhx htn, hld, nephrolithiasis sp stent dec 4 2023 with Dr Malone - who presents with right flank pain, without nausea, vomiting or fevers. No associated abd pain or chest pain. Did not take anything for pain today.  - pt with recurrent renal colic, no infectious symptoms, due to persistent pain will obtain ct a/p eval ureteral stent and eval for further kidney stones, check UA rule out UTI, eval renal fcn on labs, urology c.s for further management/ care  - d/w urology team who recommends admission to medicine, pt to go to OR dayron for ureteroscopy with lithotripsy for management of stones

## 2023-12-28 NOTE — ED PROVIDER NOTE - OBJECTIVE STATEMENT
68M pmhx htn, hld, nephrolithiasis sp stent dec 4 2023 with Dr Malone - who presents with right flank pain, without nausea, vomiting or fevers. No associated abd pain or chest pain. Did not take anything for pain today.

## 2023-12-28 NOTE — CONSULT NOTE ADULT - ASSESSMENT
67 Y/O male with a PMHx of HTN, HLD, multiple renal stones, and prostate cancer and a PSHx of lithotripsy with stent placement, hip fx repair, and multiple knee surgeries presents to the ED for flank pain. He was previously hospitalized on 11/30/2023 for multiple renal stones and had a stent placed in the right ureter by Dr. Malone. Presently afebrile with no leukocytosis. CT scan reveals no hydronephrosis and multiple renal stones.       PLAN:     - Recommend admission to medicine   - OR planning for ureteroscopy with lithotripsy   - NPO after midnight   - DVT ppx; OOB walking as tolerated  - Discussed with Dr. Malone   69 Y/O male with a PMHx of HTN, HLD, multiple renal stones, and prostate cancer and a PSHx of lithotripsy with stent placement, hip fx repair, and multiple knee surgeries presents to the ED for flank pain. He was previously hospitalized on 11/30/2023 for multiple renal stones and had a stent placed in the right ureter by Dr. Malone. Presently afebrile with no leukocytosis. CT scan reveals no hydronephrosis and multiple renal stones.       PLAN:     - Recommend admission to medicine   - OR planning for ureteroscopy with lithotripsy   - NPO after midnight   - DVT ppx; OOB walking as tolerated  - Discussed with Dr. Malone

## 2023-12-28 NOTE — H&P ADULT - NSHPLABSRESULTS_GEN_ALL_CORE
LABS:                        12.4   5.03  )-----------( 259      ( 28 Dec 2023 13:50 )             36.6         141  |  107  |  18  ----------------------------<  109<H>  5.0   |  28  |  0.92    Ca    9.3      28 Dec 2023 13:50    TPro  7.3  /  Alb  3.0<L>  /  TBili  0.4  /  DBili  x   /  AST  67<H>  /  ALT  69  /  AlkPhos  115            Urinalysis Basic - ( 28 Dec 2023 13:50 )    Color: Yellow / Appearance: Cloudy / S.015 / pH: x  Gluc: 109 mg/dL / Ketone: Negative mg/dL  / Bili: Negative / Urobili: 0.2 mg/dL   Blood: x / Protein: 300 mg/dL / Nitrite: Negative   Leuk Esterase: Small / RBC: Too Numerous to count /HPF / WBC 5-10 /HPF   Sq Epi: x / Non Sq Epi: x / Bacteria: Occasional /HPF      Lactate, Blood: 1.5 mmol/L ( @ 13:50)

## 2023-12-28 NOTE — H&P ADULT - NSHPPHYSICALEXAM_GEN_ALL_CORE
T(C): 36.8 (12-28-23 @ 15:38), Max: 36.8 (12-28-23 @ 15:38)  HR: 55 (12-28-23 @ 15:38) (55 - 77)  BP: 117/62 (12-28-23 @ 15:38) (117/62 - 123/79)  RR: 19 (12-28-23 @ 15:38) (19 - 20)  SpO2: 97% (12-28-23 @ 15:38) (97% - 98%)  GENERAL: NAD, lying in bed comfortably  HEAD:  Atraumatic, normocephalic  EYES: EOMI, PERRLA, conjunctiva and sclera clear  NECK: Supple, trachea midline, no JVD  HEART: Regular rate and rhythm, no murmurs, rubs, or gallops  LUNGS: Unlabored respirations.  Clear to auscultation bilaterally, no crackles, wheezing, or rhonchi  ABDOMEN: Soft,  mild right flank discomfort,  nondistended, +BS  EXTREMITIES: 2+ peripheral pulses bilaterally. No clubbing, cyanosis, or edema  NERVOUS SYSTEM:  A&Ox3, moving all extremities, no focal deficits   SKIN: No rashes or lesions

## 2023-12-28 NOTE — ED ADULT NURSE NOTE - NSFALLUNIVINTERV_ED_ALL_ED
Bed/Stretcher in lowest position, wheels locked, appropriate side rails in place/Call bell, personal items and telephone in reach/Instruct patient to call for assistance before getting out of bed/chair/stretcher/Non-slip footwear applied when patient is off stretcher/[place] to call system/Physically safe environment - no spills, clutter or unnecessary equipment/Purposeful proactive rounding/Room/bathroom lighting operational, light cord in reach Bed/Stretcher in lowest position, wheels locked, appropriate side rails in place/Call bell, personal items and telephone in reach/Instruct patient to call for assistance before getting out of bed/chair/stretcher/Non-slip footwear applied when patient is off stretcher/[place] to call system/Physically safe environment - no spills, clutter or unnecessary equipment/Purposeful proactive rounding/Room/bathroom lighting operational, light cord in reach

## 2023-12-28 NOTE — H&P ADULT - ASSESSMENT
67 Y/O male       h/o  HTN, HLD, multiple renal stones, and prostate cancer and a PSHx of lithotripsy with stent placement,         hip fx repair, and multiple knee surgeries        presents to the ED for flank pain,   per pt,   pain started 3 days ago and has been constant in the right flank.       also has  dysuria   difficulty with urination secondary to pain      was  hospitalized on 11/30/2023 for multiple renal stones and had a stent placed in the right ureter      denies chest pain,sob, , fevers, chills, N/V/D, hematuria, malaise.     admitted with R  flank pain.  from  kidney  stones      h/o recent right ureteral  stent/  dr hassan       normal  wbc.  pt is afberile      CT  a/p,  no hydro, R  kidney stones        on iv rocephin   HTN/  HLD         norvasc/  valsratn/ hctz    per  urology, plan  is OR in am     discussed with e r attending/  ekg pending     rad< from: CT Abdomen and Pelvis No Cont (12.28.23 @ 15:29) >  IMPRESSION:  Interval placement of right ureteral stent. No hydronephrosis. 2 mm right   mid ureteral calculus adjacent to the stent. Residual 6 mm renal pelvic   calculus adjacent to the stent and2 nonobstructing right lower pole   calculi.  Perivesical edema suspicious for cystitis.  Rectal wall thickening secondary to underdistention or proctitis.  --- End of Report ---      <   69 Y/O male       h/o  HTN, HLD, multiple renal stones, and prostate cancer and a PSHx of lithotripsy with stent placement,         hip fx repair, and multiple knee surgeries        presents to the ED for flank pain,   per pt,   pain started 3 days ago and has been constant in the right flank.       also has  dysuria   difficulty with urination secondary to pain      was  hospitalized on 11/30/2023 for multiple renal stones and had a stent placed in the right ureter      denies chest pain,sob, , fevers, chills, N/V/D, hematuria, malaise.     admitted with R  flank pain.  from  kidney  stones      h/o recent right ureteral  stent  on 12/4/23,  dr hassan       normal  wbc.  pt is afebrile       CT  a/p,  no hydro, R  kidney stones        on iv rocephin   HTN/  HLD         norvasc/  valsratn/ hctz    per  urology, plan  is OR in am     discussed with e r attending/  ekg pending     rad< from: CT Abdomen and Pelvis No Cont (12.28.23 @ 15:29) >  IMPRESSION:  Interval placement of right ureteral stent. No hydronephrosis. 2 mm right   mid ureteral calculus adjacent to the stent. Residual 6 mm renal pelvic   calculus adjacent to the stent and2 nonobstructing right lower pole   calculi.  Perivesical edema suspicious for cystitis.  Rectal wall thickening secondary to underdistention or proctitis.  --- End of Report ---      <   69 Y/O male       h/o  HTN, HLD, multiple renal stones, and prostate cancer and a PSHx of lithotripsy with stent placement,         hip fx repair, and multiple knee surgeries        presents to the ED for flank pain,   per pt,   pain started 3 days ago and has been constant in the right flank.       also has  dysuria   difficulty with urination secondary to pain      was  hospitalized on 11/30/2023 for multiple renal stones and had a stent placed in the right ureter      denies chest pain,sob, , fevers, chills, N/V/D, hematuria, malaise.     admitted with R  flank pain.  from  kidney  stones      h/o recent right ureteral  stent  on 12/4/23,  dr hassan       normal  wbc.  pt is afebrile       CT  a/p,  no hydro, R  kidney stones        on iv rocephin   HTN/  HLD         norvasc/  valsratn/ hctz/ torpol    CAD.         stress  test 4/23. ischemia/.  pt;s card  d r  arsalan  kendall/  per pt , he did  not have  an angigram         card baldomero kruse     per  urology, plan  is OR in am     discussed with e r attending/  ekg pending     rad< from: CT Abdomen and Pelvis No Cont (12.28.23 @ 15:29) >  IMPRESSION:  Interval placement of right ureteral stent. No hydronephrosis. 2 mm right   mid ureteral calculus adjacent to the stent. Residual 6 mm renal pelvic   calculus adjacent to the stent and2 nonobstructing right lower pole   calculi.  Perivesical edema suspicious for cystitis.  Rectal wall thickening secondary to underdistention or proctitis.  --- End of Report ---      <

## 2023-12-28 NOTE — H&P ADULT - NSHPREVIEWOFSYSTEMS_GEN_ALL_CORE
REVIEW OF SYSTEMS:  CONSTITUTIONAL: No fever,  no  weight loss  ENT:  No  tinnitus,   no   vertigo  NECK: No pain or stiffness  RESPIRATORY: No cough, wheezing, chills or hemoptysis;    No Shortness of Breath  CARDIOVASCULAR: No chest pain, palpitations, dizziness  GASTROINTESTINAL:   + abdominal   pain. No nausea, vomiting, or hematemesis; No diarrhea  No melena or hematochezia.  GENITOURINARY: No dysuria, frequency, hematuria, or incontinence  NEUROLOGICAL: No headaches  SKIN: No itching,  no   rash  LYMPH Nodes: No enlarged glands  ENDOCRINE: No heat or cold intolerance  MUSCULOSKELETAL: No joint pain or swelling  PSYCHIATRIC: No depression, anxiety  HEME/LYMPH: No easy bruising, or bleeding gums  ALLERGY AND IMMUNOLOGIC: No hives or eczema

## 2023-12-28 NOTE — ED ADULT NURSE NOTE - OBJECTIVE STATEMENT
pt arrived by EMS from home, pt is ax4, on room air, ambulatory w/ a steady gait, R flank pain, patient states he was treated 2 weeks ago for kidney stones, saw urologist but pain returned today. Denies any nausea and vomiting, chest pain.

## 2023-12-28 NOTE — ED ADULT NURSE NOTE - CAS TRG GEN SKIN CONDITION
She is also due for a medication consent. Please print all three consents and mail to patient to sign and send back.    Thank you   Warm

## 2023-12-28 NOTE — ED PROVIDER NOTE - PHYSICAL EXAMINATION
Gen: aox3, nad   Head: NCAT  ENT: Airway patent, moist mucous membranes, nasal passageways clear  Cardiac: Normal rate, normal rhythm, no murmurs  Respiratory: Lungs CTA B/L  Gastrointestinal: Abdomen soft, nontender, nondistended, no rebound, no guarding  MSK: No gross abnormalities, FROM of all four extremities, no edema  HEME: Extremities warm, pulses intact and symmetrical in all four extremities  Skin: No rashes, no lesions  Neuro: No gross neurologic deficits

## 2023-12-28 NOTE — H&P ADULT - HISTORY OF PRESENT ILLNESS
69 Y/O male       h/o  HTN, HLD, multiple renal stones, and prostate cancer and a PSHx of lithotripsy with stent placement,         hip fx repair, and multiple knee surgeries        presents to the ED for flank pain.     per pt,   pain started 3 days ago and has been constant in the right flank.       also has  dysuria   difficulty with urination secondary to pain      was  hospitalized on 11/30/2023 for multiple renal stones and had a stent placed in the right ureter      denies chest pain,sob, , fevers, chills, N/V/D, hematuria, malaise.

## 2023-12-28 NOTE — ED ADULT NURSE REASSESSMENT NOTE - NS ED NURSE REASSESS COMMENT FT1
pt is laying down, vitals are stable, pt is aware of POC, pt reports not needing anything at the moment, no other concerns are noted.

## 2023-12-28 NOTE — CONSULT NOTE ADULT - SUBJECTIVE AND OBJECTIVE BOX
Chief Complaint:  Patient is a 68y old  Male who presents with a chief complaint of flank pain     HPI:    69 Y/O male with a PMHx of HTN, HLD, multiple renal stones, and prostate cancer and a PSHx of lithotripsy with stent placement, hip fx repair, and multiple knee surgeries presents to the ED for flank pain. He states the pain started 3 days ago and has been constant in the right flank. He has also had pain and difficulty with urination secondary to pain. He has not taken any medications for the pain at home. He was previously hospitalized on 2023 for multiple renal stones and had a stent placed in the right ureter. He denies chest pain, SOB, fevers, chills, N/V/D, hematuria, malaise.     PMH/PSH:PAST MEDICAL & SURGICAL HISTORY:  Hypertension      Prostate cancer      Alcohol abuse, daily use      S/P hip replacement, right          Allergies:  morphine (Nausea)      Medications:  acetaminophen     Tablet .. 650 milliGRAM(s) Oral once      Review of Systems:  Negative except for HPI    Relevant Family History:   FAMILY HISTORY:  FH: prostate cancer (Father, Sibling)        Relevant Social History: Alcohol ( -) , Tobacco ( -) , Illicit drugs (- )     Physical Exam:    Vital Signs:  Vital Signs Last 24 Hrs  T(C): 36.8 (28 Dec 2023 15:38), Max: 36.8 (28 Dec 2023 15:38)  T(F): 98.2 (28 Dec 2023 15:38), Max: 98.2 (28 Dec 2023 15:38)  HR: 55 (28 Dec 2023 15:38) (55 - 77)  BP: 117/62 (28 Dec 2023 15:38) (117/62 - 123/79)  BP(mean): --  RR: 19 (28 Dec 2023 15:38) (19 - 20)  SpO2: 97% (28 Dec 2023 15:38) (97% - 98%)    Parameters below as of 28 Dec 2023 13:18  Patient On (Oxygen Delivery Method): room air      Daily Height in cm: 195.58 (28 Dec 2023 13:18)        General:  Appears stated age, no distress  HEENT:  NC/AT,  conjunctivae clear and pink, no thyromegaly, nodules, adenopathy, no JVD, anicteric sclera  Chest:  Full & symmetric excursion, no increased effort, breath sounds clear  Cardiovascular:  Regular rhythm, S1, S2, no murmur/rub/S3/S4,   Abdomen:  Soft, non tender, non distended, normoactive bowel sounds, no masses   : Right flank TTP, discharge from penis, no swelling in scrotum   Extremities:  no cyanosis, clubbing or edema  Skin:  No rash/erythema/ecchymoses/petechiae/wounds/abscess/warm/dry  Neuro/Psych:  Alert, oriented, no asterixis, no tremor, no encephalopathy    Laboratory:                          12.4   5.03  )-----------( 259      ( 28 Dec 2023 13:50 )             36.6         141  |  107  |  18  ----------------------------<  109<H>  5.0   |  28  |  0.92    Ca    9.3      28 Dec 2023 13:50    TPro  7.3  /  Alb  3.0<L>  /  TBili  0.4  /  DBili  x   /  AST  67<H>  /  ALT  69  /  AlkPhos  115      LIVER FUNCTIONS - ( 28 Dec 2023 13:50 )  Alb: 3.0 g/dL / Pro: 7.3 gm/dL / ALK PHOS: 115 U/L / ALT: 69 U/L / AST: 67 U/L / GGT: x             Urinalysis Basic - ( 28 Dec 2023 13:50 )    Color: Yellow / Appearance: Cloudy / S.015 / pH: x  Gluc: 109 mg/dL / Ketone: Negative mg/dL  / Bili: Negative / Urobili: 0.2 mg/dL   Blood: x / Protein: 300 mg/dL / Nitrite: Negative   Leuk Esterase: Small / RBC: Too Numerous to count /HPF / WBC 5-10 /HPF   Sq Epi: x / Non Sq Epi: x / Bacteria: Occasional /HPF      Amylase Serum--      Lipase serum23 U/L       Ammonia--      Intake and Output      Imaging:    < from: CT Abdomen and Pelvis No Cont (23 @ 15:29) >    ACC: [id number] EXAM:  CT ABDOMEN AND PELVIS   ORDERED BY: TODD WOODALL     PROCEDURE DATE:  2023          INTERPRETATION:  CLINICAL INFORMATION: 68 years  Male with R flank pain,   hx kidney stones and R ureteral stent c/o severe right flank pain.    COMPARISON: Noncontrast CT abdomen and pelvis 2023    CONTRAST/COMPLICATIONS:  IV Contrast: NONE  Oral Contrast: NONE  Complications: None reported at time of study completion    PROCEDURE:  CT of the Abdomen and Pelvis was performed.  Sagittal and coronal reformats were performed.    LIMITATIONS: Evaluation of the solid organs, vascular structures and GI   tract is limited without oral and IV contrast. Motion artifact. Streak   artifact from patient's arms.    FINDINGS:  LOWER CHEST:Mild bibasilar dependent atelectasis.    LIVER: Within normal limits.  BILE DUCTS: Normal caliber.  GALLBLADDER: Cholecystectomy.  SPLEEN: Within normal limits.  PANCREAS: Within normal limits.  ADRENALS: Within normal limits.  KIDNEYS/URETERS: Interval placement of right double pigtail ureteral   stent. 6 mm right renal pelvic calculus adjacent to the stent. 2 mm right   mid ureteral calculus (2:94) adjacent to the stent at the level of the   L3-4 disc. No hydronephrosis. Proximal pigtail in an upper pole calyx and   distal pigtail in the bladder. 2 5 mm nonobstructing right lower pole   calculi.  No left hydroureteronephrosis. Left interpolar cyst.    BLADDER: Incompletely distended. Perivesical edema.  REPRODUCTIVE ORGANS: Enlarged prostate.    BOWEL: No bowel obstruction. Appendix is normal.. Rectal wall thickening   secondary to underdistention or proctitis.  PERITONEUM: No ascites.  VESSELS: Within normal limits.  RETROPERITONEUM/LYMPH NODES: No lymphadenopathy.  ABDOMINAL WALL: Within normal limits.  BONES: Right hip arthroplasty.    IMPRESSION:  Interval placement of right ureteral stent. No hydronephrosis. 2 mm right   mid ureteral calculus adjacent to the stent. Residual 6 mm renal pelvic   calculus adjacent to the stent and2 nonobstructing right lower pole   calculi.    Perivesical edema suspicious for cystitis.    Rectal wall thickening secondary to underdistention or proctitis.        --- End of Report ---            HARSH VICTORIA MD; Attending Radiologist  This document has been electronically signed. Dec 28 2023  3:51PM    < end of copied text >     Chief Complaint:  Patient is a 68y old  Male who presents with a chief complaint of flank pain     HPI:    69 Y/O male with a PMHx of HTN, HLD, multiple renal stones, and prostate cancer and a PSHx of lithotripsy with stent placement, hip fx repair, and multiple knee surgeries presents to the ED for flank pain. He states the pain started 3 days ago and has been constant in the right flank. He has also had pain and difficulty with urination secondary to pain. He has not taken any medications for the pain at home. He was previously hospitalized on 2023 for multiple renal stones and had a stent placed in the right ureter. He denies chest pain, SOB, fevers, chills, N/V/D, hematuria, malaise.     PMH/PSH:PAST MEDICAL & SURGICAL HISTORY:  Hypertension      Prostate cancer      Alcohol abuse, daily use      S/P hip replacement, right          Allergies:  morphine (Nausea)      Medications:  acetaminophen     Tablet .. 650 milliGRAM(s) Oral once      Review of Systems:  Negative except for HPI    Relevant Family History:   FAMILY HISTORY:  FH: prostate cancer (Father, Sibling)        Relevant Social History: Alcohol ( -) , Tobacco ( -) , Illicit drugs (- )     Physical Exam:    Vital Signs:  Vital Signs Last 24 Hrs  T(C): 36.8 (28 Dec 2023 15:38), Max: 36.8 (28 Dec 2023 15:38)  T(F): 98.2 (28 Dec 2023 15:38), Max: 98.2 (28 Dec 2023 15:38)  HR: 55 (28 Dec 2023 15:38) (55 - 77)  BP: 117/62 (28 Dec 2023 15:38) (117/62 - 123/79)  BP(mean): --  RR: 19 (28 Dec 2023 15:38) (19 - 20)  SpO2: 97% (28 Dec 2023 15:38) (97% - 98%)    Parameters below as of 28 Dec 2023 13:18  Patient On (Oxygen Delivery Method): room air      Daily Height in cm: 195.58 (28 Dec 2023 13:18)        General:  Appears stated age, no distress  HEENT:  NC/AT,  conjunctivae clear and pink, no thyromegaly, nodules, adenopathy, no JVD, anicteric sclera  Chest:  Full & symmetric excursion, no increased effort, breath sounds clear  Cardiovascular:  Regular rhythm, S1, S2, no murmur/rub/S3/S4,   Abdomen:  Soft, non tender, non distended, normoactive bowel sounds, no masses   : Right flank TTP, discharge from penis, no swelling in scrotum   Extremities:  no cyanosis, clubbing or edema  Skin:  No rash/erythema/ecchymoses/petechiae/wounds/abscess/warm/dry  Neuro/Psych:  Alert, oriented, no asterixis, no tremor, no encephalopathy    Laboratory:                          12.4   5.03  )-----------( 259      ( 28 Dec 2023 13:50 )             36.6         141  |  107  |  18  ----------------------------<  109<H>  5.0   |  28  |  0.92    Ca    9.3      28 Dec 2023 13:50    TPro  7.3  /  Alb  3.0<L>  /  TBili  0.4  /  DBili  x   /  AST  67<H>  /  ALT  69  /  AlkPhos  115      LIVER FUNCTIONS - ( 28 Dec 2023 13:50 )  Alb: 3.0 g/dL / Pro: 7.3 gm/dL / ALK PHOS: 115 U/L / ALT: 69 U/L / AST: 67 U/L / GGT: x             Urinalysis Basic - ( 28 Dec 2023 13:50 )    Color: Yellow / Appearance: Cloudy / S.015 / pH: x  Gluc: 109 mg/dL / Ketone: Negative mg/dL  / Bili: Negative / Urobili: 0.2 mg/dL   Blood: x / Protein: 300 mg/dL / Nitrite: Negative   Leuk Esterase: Small / RBC: Too Numerous to count /HPF / WBC 5-10 /HPF   Sq Epi: x / Non Sq Epi: x / Bacteria: Occasional /HPF      Amylase Serum--      Lipase serum23 U/L       Ammonia--      Intake and Output      Imaging:    < from: CT Abdomen and Pelvis No Cont (23 @ 15:29) >    ACC: [id number] EXAM:  CT ABDOMEN AND PELVIS   ORDERED BY: TODD WOODALL     PROCEDURE DATE:  2023          INTERPRETATION:  CLINICAL INFORMATION: 68 years  Male with R flank pain,   hx kidney stones and R ureteral stent c/o severe right flank pain.    COMPARISON: Noncontrast CT abdomen and pelvis 2023    CONTRAST/COMPLICATIONS:  IV Contrast: NONE  Oral Contrast: NONE  Complications: None reported at time of study completion    PROCEDURE:  CT of the Abdomen and Pelvis was performed.  Sagittal and coronal reformats were performed.    LIMITATIONS: Evaluation of the solid organs, vascular structures and GI   tract is limited without oral and IV contrast. Motion artifact. Streak   artifact from patient's arms.    FINDINGS:  LOWER CHEST:Mild bibasilar dependent atelectasis.    LIVER: Within normal limits.  BILE DUCTS: Normal caliber.  GALLBLADDER: Cholecystectomy.  SPLEEN: Within normal limits.  PANCREAS: Within normal limits.  ADRENALS: Within normal limits.  KIDNEYS/URETERS: Interval placement of right double pigtail ureteral   stent. 6 mm right renal pelvic calculus adjacent to the stent. 2 mm right   mid ureteral calculus (2:94) adjacent to the stent at the level of the   L3-4 disc. No hydronephrosis. Proximal pigtail in an upper pole calyx and   distal pigtail in the bladder. 2 5 mm nonobstructing right lower pole   calculi.  No left hydroureteronephrosis. Left interpolar cyst.    BLADDER: Incompletely distended. Perivesical edema.  REPRODUCTIVE ORGANS: Enlarged prostate.    BOWEL: No bowel obstruction. Appendix is normal.. Rectal wall thickening   secondary to underdistention or proctitis.  PERITONEUM: No ascites.  VESSELS: Within normal limits.  RETROPERITONEUM/LYMPH NODES: No lymphadenopathy.  ABDOMINAL WALL: Within normal limits.  BONES: Right hip arthroplasty.    IMPRESSION:  Interval placement of right ureteral stent. No hydronephrosis. 2 mm right   mid ureteral calculus adjacent to the stent. Residual 6 mm renal pelvic   calculus adjacent to the stent and2 nonobstructing right lower pole   calculi.    Perivesical edema suspicious for cystitis.    Rectal wall thickening secondary to underdistention or proctitis.        --- End of Report ---            HARSH VICTORIA MD; Attending Radiologist  This document has been electronically signed. Dec 28 2023  3:51PM    < end of copied text >

## 2023-12-28 NOTE — ED ADULT TRIAGE NOTE - CHIEF COMPLAINT QUOTE
Patient BIB EMS from home for R flank pain, patient states he was treated 2 weeks ago for kidney stones, saw urologist but pain returned today. Denies any nausea and vomiting.

## 2023-12-29 ENCOUNTER — RESULT REVIEW (OUTPATIENT)
Age: 68
End: 2023-12-29

## 2023-12-29 LAB
CULTURE RESULTS: SIGNIFICANT CHANGE UP
CULTURE RESULTS: SIGNIFICANT CHANGE UP
SPECIMEN SOURCE: SIGNIFICANT CHANGE UP
SPECIMEN SOURCE: SIGNIFICANT CHANGE UP

## 2023-12-29 PROCEDURE — 52356 CYSTO/URETERO W/LITHOTRIPSY: CPT | Mod: RT,22

## 2023-12-29 PROCEDURE — 74420 UROGRAPHY RTRGR +-KUB: CPT | Mod: 26

## 2023-12-29 PROCEDURE — 99232 SBSQ HOSP IP/OBS MODERATE 35: CPT

## 2023-12-29 PROCEDURE — 88300 SURGICAL PATH GROSS: CPT | Mod: 26

## 2023-12-29 DEVICE — URETERAL STENT CONTOUR 7FR 28CM: Type: IMPLANTABLE DEVICE | Site: RIGHT | Status: FUNCTIONAL

## 2023-12-29 DEVICE — URETERAL SHEATH NAVIGATOR HD 12/14FR X 36CM: Type: IMPLANTABLE DEVICE | Site: RIGHT | Status: FUNCTIONAL

## 2023-12-29 DEVICE — URETEROSCOPE LITHOVUE DISP: Type: IMPLANTABLE DEVICE | Site: RIGHT | Status: FUNCTIONAL

## 2023-12-29 DEVICE — GWIRE STR .035X150 STD: Type: IMPLANTABLE DEVICE | Site: RIGHT | Status: FUNCTIONAL

## 2023-12-29 DEVICE — URETERAL CATH OPEN END FLEXI-TIP 5FR .038" X 70CM: Type: IMPLANTABLE DEVICE | Site: RIGHT | Status: FUNCTIONAL

## 2023-12-29 DEVICE — AIRWAY BASKET ZERO TIP 12MM: Type: IMPLANTABLE DEVICE | Site: RIGHT | Status: FUNCTIONAL

## 2023-12-29 DEVICE — LASER FIBER SOLTIVE 150 BALL TIP: Type: IMPLANTABLE DEVICE | Site: RIGHT | Status: FUNCTIONAL

## 2023-12-29 DEVICE — GUIDEWIRE SENSOR DUAL-FLEX NITINOL STRAIGHT .038" X 150CM: Type: IMPLANTABLE DEVICE | Site: RIGHT | Status: FUNCTIONAL

## 2023-12-29 RX ORDER — ACETAMINOPHEN 500 MG
975 TABLET ORAL EVERY 6 HOURS
Refills: 0 | Status: DISCONTINUED | OUTPATIENT
Start: 2023-12-29 | End: 2023-12-31

## 2023-12-29 RX ORDER — HYDROMORPHONE HYDROCHLORIDE 2 MG/ML
1 INJECTION INTRAMUSCULAR; INTRAVENOUS; SUBCUTANEOUS
Refills: 0 | Status: DISCONTINUED | OUTPATIENT
Start: 2023-12-29 | End: 2023-12-29

## 2023-12-29 RX ORDER — HYDROMORPHONE HYDROCHLORIDE 2 MG/ML
0.5 INJECTION INTRAMUSCULAR; INTRAVENOUS; SUBCUTANEOUS
Refills: 0 | Status: DISCONTINUED | OUTPATIENT
Start: 2023-12-29 | End: 2023-12-29

## 2023-12-29 RX ORDER — ONDANSETRON 8 MG/1
4 TABLET, FILM COATED ORAL ONCE
Refills: 0 | Status: DISCONTINUED | OUTPATIENT
Start: 2023-12-29 | End: 2023-12-29

## 2023-12-29 RX ORDER — FUROSEMIDE 40 MG
10 TABLET ORAL ONCE
Refills: 0 | Status: DISCONTINUED | OUTPATIENT
Start: 2023-12-29 | End: 2023-12-29

## 2023-12-29 RX ORDER — INFLUENZA VIRUS VACCINE 15; 15; 15; 15 UG/.5ML; UG/.5ML; UG/.5ML; UG/.5ML
0.7 SUSPENSION INTRAMUSCULAR ONCE
Refills: 0 | Status: COMPLETED | OUTPATIENT
Start: 2023-12-29 | End: 2023-12-29

## 2023-12-29 RX ORDER — SODIUM CHLORIDE 9 MG/ML
1000 INJECTION, SOLUTION INTRAVENOUS
Refills: 0 | Status: DISCONTINUED | OUTPATIENT
Start: 2023-12-29 | End: 2023-12-29

## 2023-12-29 RX ORDER — KETOROLAC TROMETHAMINE 30 MG/ML
15 SYRINGE (ML) INJECTION EVERY 6 HOURS
Refills: 0 | Status: DISCONTINUED | OUTPATIENT
Start: 2023-12-29 | End: 2023-12-30

## 2023-12-29 RX ORDER — ACETAMINOPHEN 500 MG
1000 TABLET ORAL ONCE
Refills: 0 | Status: COMPLETED | OUTPATIENT
Start: 2023-12-29 | End: 2023-12-29

## 2023-12-29 RX ADMIN — HYDROMORPHONE HYDROCHLORIDE 1 MILLIGRAM(S): 2 INJECTION INTRAMUSCULAR; INTRAVENOUS; SUBCUTANEOUS at 15:27

## 2023-12-29 RX ADMIN — HYDROMORPHONE HYDROCHLORIDE 1 MILLIGRAM(S): 2 INJECTION INTRAMUSCULAR; INTRAVENOUS; SUBCUTANEOUS at 16:13

## 2023-12-29 RX ADMIN — HEPARIN SODIUM 5000 UNIT(S): 5000 INJECTION INTRAVENOUS; SUBCUTANEOUS at 18:04

## 2023-12-29 RX ADMIN — Medication 15 MILLIGRAM(S): at 19:16

## 2023-12-29 RX ADMIN — SODIUM CHLORIDE 75 MILLILITER(S): 9 INJECTION, SOLUTION INTRAVENOUS at 06:59

## 2023-12-29 RX ADMIN — Medication 975 MILLIGRAM(S): at 19:21

## 2023-12-29 RX ADMIN — ATORVASTATIN CALCIUM 40 MILLIGRAM(S): 80 TABLET, FILM COATED ORAL at 21:55

## 2023-12-29 RX ADMIN — CEFTRIAXONE 100 MILLIGRAM(S): 500 INJECTION, POWDER, FOR SOLUTION INTRAMUSCULAR; INTRAVENOUS at 17:00

## 2023-12-29 RX ADMIN — Medication 1000 MILLIGRAM(S): at 07:58

## 2023-12-29 RX ADMIN — Medication 400 MILLIGRAM(S): at 06:58

## 2023-12-29 RX ADMIN — Medication 25 MILLIGRAM(S): at 06:30

## 2023-12-29 RX ADMIN — TAMSULOSIN HYDROCHLORIDE 0.4 MILLIGRAM(S): 0.4 CAPSULE ORAL at 21:55

## 2023-12-29 RX ADMIN — Medication 975 MILLIGRAM(S): at 18:47

## 2023-12-29 RX ADMIN — AMLODIPINE BESYLATE 5 MILLIGRAM(S): 2.5 TABLET ORAL at 07:35

## 2023-12-29 RX ADMIN — VALSARTAN 160 MILLIGRAM(S): 80 TABLET ORAL at 06:30

## 2023-12-29 NOTE — PROGRESS NOTE ADULT - SUBJECTIVE AND OBJECTIVE BOX
Patient is a 68y old  Male who presents with a chief complaint of right   flank pain (28 Dec 2023 17:47)    INTERVAL HPI/OVERNIGHT EVENTS: No acute events overnight. HD stable.     MEDICATIONS  (STANDING):  amLODIPine   Tablet 5 milliGRAM(s) Oral daily  atorvastatin 40 milliGRAM(s) Oral at bedtime  cefTRIAXone   IVPB 1000 milliGRAM(s) IV Intermittent every 24 hours  heparin   Injectable 5000 Unit(s) SubCutaneous every 12 hours  hydrochlorothiazide 12.5 milliGRAM(s) Oral daily  influenza  Vaccine (HIGH DOSE) 0.7 milliLiter(s) IntraMuscular once  lactated ringers. 1000 milliLiter(s) (75 mL/Hr) IV Continuous <Continuous>  metoprolol succinate ER 25 milliGRAM(s) Oral daily  tamsulosin 0.4 milliGRAM(s) Oral at bedtime  valsartan 160 milliGRAM(s) Oral daily    MEDICATIONS  (PRN):  acetaminophen     Tablet .. 975 milliGRAM(s) Oral every 6 hours PRN Moderate Pain (4 - 6)      Allergies    morphine (Nausea)    Intolerances        REVIEW OF SYSTEMS: all negative with exception of above    Vital Signs Last 24 Hrs  T(C): 36.3 (29 Dec 2023 17:09), Max: 37 (28 Dec 2023 20:17)  T(F): 97.3 (29 Dec 2023 17:09), Max: 98.6 (28 Dec 2023 20:17)  HR: 57 (29 Dec 2023 17:09) (54 - 73)  BP: 134/82 (29 Dec 2023 17:09) (117/74 - 145/91)  BP(mean): --  RR: 16 (29 Dec 2023 17:09) (14 - 20)  SpO2: 96% (29 Dec 2023 17:09) (95% - 100%)    Parameters below as of 29 Dec 2023 14:22  Patient On (Oxygen Delivery Method): room air        PHYSICAL EXAM:  GENERAL: NAD, lying in bed comfortably  HEART: Regular rate and rhythm, no murmurs, rubs, or gallops  LUNGS: Unlabored respirations.  Clear to auscultation bilaterally, no crackles, wheezing, or rhonchi  ABDOMEN: Soft,  mild right flank discomfort,  nondistended, +BS  EXTREMITIES: 2+ peripheral pulses bilaterally. No clubbing, cyanosis, or edema  NERVOUS SYSTEM:  A&Ox3, moving all extremities, no focal deficits     LABS:                        12.4   5.03  )-----------( 259      ( 28 Dec 2023 13:50 )             36.6         141  |  107  |  18  ----------------------------<  109<H>  5.0   |  28  |  0.92    Ca    9.3      28 Dec 2023 13:50    TPro  7.3  /  Alb  3.0<L>  /  TBili  0.4  /  DBili  x   /  AST  67<H>  /  ALT  69  /  AlkPhos  115        Urinalysis Basic - ( 28 Dec 2023 13:50 )    Color: Yellow / Appearance: Cloudy / S.015 / pH: x  Gluc: 109 mg/dL / Ketone: Negative mg/dL  / Bili: Negative / Urobili: 0.2 mg/dL   Blood: x / Protein: 300 mg/dL / Nitrite: Negative   Leuk Esterase: Small / RBC: Too Numerous to count /HPF / WBC 5-10 /HPF   Sq Epi: x / Non Sq Epi: x / Bacteria: Occasional /HPF      CAPILLARY BLOOD GLUCOSE          RADIOLOGY & ADDITIONAL TESTS:    Imaging Personally Reviewed:  [ ] YES  [ ] NO    Consultant(s) Notes Reviewed:  [ ] YES  [ ] NO    Care Discussed with Consultants/Other Providers [ ] YES  [ ] NO

## 2023-12-29 NOTE — BRIEF OPERATIVE NOTE - NSICDXBRIEFPOSTOP_GEN_ALL_CORE_FT
POST-OP DIAGNOSIS:  Renal stones 29-Dec-2023 14:11:05  Louis Malone  Right ureteral stone 29-Dec-2023 14:11:18  Louis Malone  Ureteral stent retained 29-Dec-2023 14:11:30  Louis Malone

## 2023-12-29 NOTE — PATIENT PROFILE ADULT - FALL HARM RISK - UNIVERSAL INTERVENTIONS
Bed in lowest position, wheels locked, appropriate side rails in place/Call bell, personal items and telephone in reach/Instruct patient to call for assistance before getting out of bed or chair/Non-slip footwear when patient is out of bed/[place] to call system/Physically safe environment - no spills, clutter or unnecessary equipment/Purposeful Proactive Rounding/Room/bathroom lighting operational, light cord in reach Bed in lowest position, wheels locked, appropriate side rails in place/Call bell, personal items and telephone in reach/Instruct patient to call for assistance before getting out of bed or chair/Non-slip footwear when patient is out of bed/[place] to call system/Physically safe environment - no spills, clutter or unnecessary equipment/Purposeful Proactive Rounding/Room/bathroom lighting operational, light cord in reach

## 2023-12-29 NOTE — BRIEF OPERATIVE NOTE - NSICDXBRIEFPROCEDURE_GEN_ALL_CORE_FT
PROCEDURES:  Ureteroscopy, with laser lithotripsy and stent insertion 29-Dec-2023 14:10:04  Louis Malone  Retrograde pyelogram 29-Dec-2023 14:10:18  Louis Malone

## 2023-12-29 NOTE — BRIEF OPERATIVE NOTE - NSICDXBRIEFPREOP_GEN_ALL_CORE_FT
PRE-OP DIAGNOSIS:  Calcium oxalate renal stones 29-Dec-2023 14:10:34  Louis Malone  Right ureteral stone 29-Dec-2023 14:10:42  Louis Malone  Ureteral stent retained 29-Dec-2023 14:10:52  Louis Malone

## 2023-12-29 NOTE — PROGRESS NOTE ADULT - SUBJECTIVE AND OBJECTIVE BOX
Patient seen and examined bedside resting comfortably.  endorses some persistent right flank pain   Denies nausea vomitin, diarrhea, fevers, chills, abd pain.  Tolerating diet.      T(F): 97.3 (12-29-23 @ 17:09), Max: 98.6 (12-28-23 @ 20:17)  HR: 57 (12-29-23 @ 17:09) (54 - 73)  BP: 134/82 (12-29-23 @ 17:09) (117/74 - 145/91)  RR: 16 (12-29-23 @ 17:09) (14 - 20)  SpO2: 96% (12-29-23 @ 17:09) (95% - 100%)  Wt(kg): --  CAPILLARY BLOOD GLUCOSE          PHYSICAL EXAM:  General: NAD, alert and awake  HEENT: NCAT, EOMI, conjunctiva clear  Chest: nonlabored respirations, good inspiratory effort  Abdomen: soft, NTND.   Extremities: no pedal edema or calf tenderness noted   : mild right CVAT, no suprapubic tenderness. moreira catheter in place draining mildly blood tinged urine   i  LABS:                        12.4   5.03  )-----------( 259      ( 28 Dec 2023 13:50 )             36.6   12-28    141  |  107  |  18  ----------------------------<  109<H>  5.0   |  28  |  0.92    Ca    9.3      28 Dec 2023 13:50    TPro  7.3  /  Alb  3.0<L>  /  TBili  0.4  /  DBili  x   /  AST  67<H>  /  ALT  69  /  AlkPhos  115  12-28    I&O's Detail    29 Dec 2023 07:01  -  29 Dec 2023 18:30  --------------------------------------------------------  IN:  Total IN: 0 mL    OUT:    Indwelling Catheter - Urethral (mL): 200 mL  Total OUT: 200 mL    Total NET: -200 mL          69 yo M with pmhx htn, hld, nephrolithiasis sp stent 12/42023 with Dr Malone POD0 s/p Ureteroscopy, with laser lithotripsy and R stent insertion  -c/w moreira catheter, advise TOV tm am  -continue care per primary team   -case discussed with Dr Malone

## 2023-12-29 NOTE — PROGRESS NOTE ADULT - ASSESSMENT
68 years old male with h/o HTN, HLD, nephrolithiasis present to ED with complain of severe right flank pain radiate to right groin, associated with nausea and vomiting. Patient had ? lithotripsy procedure at [facility] yesterday. No fever or chills.   Hemodynamically stable, afebrile, sat well at RA. No leukocytosis, plt 214, K 3.6, Cr 1.2. CT abd/pelvis with 4mm calculus in the proximal right ureter. 3 mm calculus in the distal right ureter. 4 mm calculus at the right UVJ. Associated mild right hydroureteronephrosis. Small nonobstructive calculi in the right kidney and right renal pelvis measuring up to 9 mm.    Problem/Plan - 1:  ·  Severe right flank pain with multiple small right ureteral obstructive stones and associated right hydronephrosis and azotemia. patient s/p lithotripsy on 11/30/23. + hematuria (now hematuria has resolved).   cont iv dilaudid for severe pain and monitor for any oversedation.   cont IVF and flomax.   no evidence of any infection at this time. No antibiotics at this time.  - S/p Ureteroscopy, with laser lithotripsy and stent insertion    Problem/Plan - 4:  ·  Problem: Benign essential HTN.   · controlled. cont to monitor.     Problem/Plan - 5:  ·  Problem: Hyperlipidemia, unspecified.   ·  Plan: on atorvastatin 20mg hs.    Anemia. normocytic. not blood loss.     Overweight. diet and exercise.     ERNESTO. Not much change in last 24 hours. Trend.    Hypokalemia: Replete and recheck     Full Code  SCDs   68 years old male with h/o HTN, HLD, nephrolithiasis present to ED with complain of severe right flank pain radiate to right groin, associated with nausea and vomiting. Patient had ? lithotripsy procedure at [facility] yesterday. No fever or chills.   Hemodynamically stable, afebrile, sat well at RA. No leukocytosis, plt 214, K 3.6, Cr 1.2. CT abd/pelvis with 4mm calculus in the proximal right ureter. 3 mm calculus in the distal right ureter. 4 mm calculus at the right UVJ. Associated mild right hydroureteronephrosis. Small nonobstructive calculi in the right kidney and right renal pelvis measuring up to 9 mm.    Problem/Plan - 1:  ·  Severe right flank pain with multiple small right ureteral obstructive stones and associated right hydronephrosis and azotemia. patient s/p lithotripsy on 11/30/23. + hematuria (now hematuria has resolved).   cont iv dilaudid for severe pain and monitor for any oversedation.   cont IVF and flomax.   no evidence of any infection at this time. No antibiotics at this time.  - S/p Ureteroscopy, with laser lithotripsy and stent insertion    Problem/Plan - 4:  ·  Problem: Benign essential HTN.   · controlled. cont to monitor.     Problem/Plan - 5:  ·  Problem: Hyperlipidemia, unspecified.   ·  Plan: on atorvastatin 20mg hs.    Anemia. normocytic. not blood loss.     Overweight. diet and exercise.     ERNESTO. Not much change in last 24 hours. Trend.    Hypokalemia: Replete and recheck     Full Code  SCDs

## 2023-12-29 NOTE — PRE-OP CHECKLIST - SELECT TESTS ORDERED
BMP/CBC/CMP/PT/PTT/INR/Hepatic Function/Type and Cross/Type and Screen/Urinalysis/Results in MD note/COVID-19

## 2023-12-30 LAB
ALBUMIN SERPL ELPH-MCNC: 2.8 G/DL — LOW (ref 3.3–5)
ALBUMIN SERPL ELPH-MCNC: 2.8 G/DL — LOW (ref 3.3–5)
ALP SERPL-CCNC: 102 U/L — SIGNIFICANT CHANGE UP (ref 40–120)
ALP SERPL-CCNC: 102 U/L — SIGNIFICANT CHANGE UP (ref 40–120)
ALT FLD-CCNC: 43 U/L — SIGNIFICANT CHANGE UP (ref 12–78)
ALT FLD-CCNC: 43 U/L — SIGNIFICANT CHANGE UP (ref 12–78)
ANION GAP SERPL CALC-SCNC: 7 MMOL/L — SIGNIFICANT CHANGE UP (ref 5–17)
ANION GAP SERPL CALC-SCNC: 7 MMOL/L — SIGNIFICANT CHANGE UP (ref 5–17)
AST SERPL-CCNC: 23 U/L — SIGNIFICANT CHANGE UP (ref 15–37)
AST SERPL-CCNC: 23 U/L — SIGNIFICANT CHANGE UP (ref 15–37)
BILIRUB SERPL-MCNC: 0.4 MG/DL — SIGNIFICANT CHANGE UP (ref 0.2–1.2)
BILIRUB SERPL-MCNC: 0.4 MG/DL — SIGNIFICANT CHANGE UP (ref 0.2–1.2)
BUN SERPL-MCNC: 21 MG/DL — SIGNIFICANT CHANGE UP (ref 7–23)
BUN SERPL-MCNC: 21 MG/DL — SIGNIFICANT CHANGE UP (ref 7–23)
CALCIUM SERPL-MCNC: 8.8 MG/DL — SIGNIFICANT CHANGE UP (ref 8.5–10.1)
CALCIUM SERPL-MCNC: 8.8 MG/DL — SIGNIFICANT CHANGE UP (ref 8.5–10.1)
CHLORIDE SERPL-SCNC: 107 MMOL/L — SIGNIFICANT CHANGE UP (ref 96–108)
CHLORIDE SERPL-SCNC: 107 MMOL/L — SIGNIFICANT CHANGE UP (ref 96–108)
CO2 SERPL-SCNC: 26 MMOL/L — SIGNIFICANT CHANGE UP (ref 22–31)
CO2 SERPL-SCNC: 26 MMOL/L — SIGNIFICANT CHANGE UP (ref 22–31)
CREAT SERPL-MCNC: 0.93 MG/DL — SIGNIFICANT CHANGE UP (ref 0.5–1.3)
CREAT SERPL-MCNC: 0.93 MG/DL — SIGNIFICANT CHANGE UP (ref 0.5–1.3)
EGFR: 91 ML/MIN/1.73M2 — SIGNIFICANT CHANGE UP
EGFR: 91 ML/MIN/1.73M2 — SIGNIFICANT CHANGE UP
GLUCOSE SERPL-MCNC: 123 MG/DL — HIGH (ref 70–99)
GLUCOSE SERPL-MCNC: 123 MG/DL — HIGH (ref 70–99)
HCT VFR BLD CALC: 34.5 % — LOW (ref 39–50)
HCT VFR BLD CALC: 34.5 % — LOW (ref 39–50)
HGB BLD-MCNC: 11.5 G/DL — LOW (ref 13–17)
HGB BLD-MCNC: 11.5 G/DL — LOW (ref 13–17)
MCHC RBC-ENTMCNC: 28.2 PG — SIGNIFICANT CHANGE UP (ref 27–34)
MCHC RBC-ENTMCNC: 28.2 PG — SIGNIFICANT CHANGE UP (ref 27–34)
MCHC RBC-ENTMCNC: 33.3 G/DL — SIGNIFICANT CHANGE UP (ref 32–36)
MCHC RBC-ENTMCNC: 33.3 G/DL — SIGNIFICANT CHANGE UP (ref 32–36)
MCV RBC AUTO: 84.6 FL — SIGNIFICANT CHANGE UP (ref 80–100)
MCV RBC AUTO: 84.6 FL — SIGNIFICANT CHANGE UP (ref 80–100)
NRBC # BLD: 0 /100 WBCS — SIGNIFICANT CHANGE UP (ref 0–0)
NRBC # BLD: 0 /100 WBCS — SIGNIFICANT CHANGE UP (ref 0–0)
PLATELET # BLD AUTO: 251 K/UL — SIGNIFICANT CHANGE UP (ref 150–400)
PLATELET # BLD AUTO: 251 K/UL — SIGNIFICANT CHANGE UP (ref 150–400)
POTASSIUM SERPL-MCNC: 3.9 MMOL/L — SIGNIFICANT CHANGE UP (ref 3.5–5.3)
POTASSIUM SERPL-MCNC: 3.9 MMOL/L — SIGNIFICANT CHANGE UP (ref 3.5–5.3)
POTASSIUM SERPL-SCNC: 3.9 MMOL/L — SIGNIFICANT CHANGE UP (ref 3.5–5.3)
POTASSIUM SERPL-SCNC: 3.9 MMOL/L — SIGNIFICANT CHANGE UP (ref 3.5–5.3)
PROT SERPL-MCNC: 6.7 GM/DL — SIGNIFICANT CHANGE UP (ref 6–8.3)
PROT SERPL-MCNC: 6.7 GM/DL — SIGNIFICANT CHANGE UP (ref 6–8.3)
RBC # BLD: 4.08 M/UL — LOW (ref 4.2–5.8)
RBC # BLD: 4.08 M/UL — LOW (ref 4.2–5.8)
RBC # FLD: 12.6 % — SIGNIFICANT CHANGE UP (ref 10.3–14.5)
RBC # FLD: 12.6 % — SIGNIFICANT CHANGE UP (ref 10.3–14.5)
SODIUM SERPL-SCNC: 140 MMOL/L — SIGNIFICANT CHANGE UP (ref 135–145)
SODIUM SERPL-SCNC: 140 MMOL/L — SIGNIFICANT CHANGE UP (ref 135–145)
WBC # BLD: 7.38 K/UL — SIGNIFICANT CHANGE UP (ref 3.8–10.5)
WBC # BLD: 7.38 K/UL — SIGNIFICANT CHANGE UP (ref 3.8–10.5)
WBC # FLD AUTO: 7.38 K/UL — SIGNIFICANT CHANGE UP (ref 3.8–10.5)
WBC # FLD AUTO: 7.38 K/UL — SIGNIFICANT CHANGE UP (ref 3.8–10.5)

## 2023-12-30 PROCEDURE — 99232 SBSQ HOSP IP/OBS MODERATE 35: CPT

## 2023-12-30 RX ADMIN — Medication 15 MILLIGRAM(S): at 07:08

## 2023-12-30 RX ADMIN — Medication 975 MILLIGRAM(S): at 22:14

## 2023-12-30 RX ADMIN — AMLODIPINE BESYLATE 5 MILLIGRAM(S): 2.5 TABLET ORAL at 05:54

## 2023-12-30 RX ADMIN — SODIUM CHLORIDE 75 MILLILITER(S): 9 INJECTION, SOLUTION INTRAVENOUS at 00:17

## 2023-12-30 RX ADMIN — TAMSULOSIN HYDROCHLORIDE 0.4 MILLIGRAM(S): 0.4 CAPSULE ORAL at 22:12

## 2023-12-30 RX ADMIN — HEPARIN SODIUM 5000 UNIT(S): 5000 INJECTION INTRAVENOUS; SUBCUTANEOUS at 05:54

## 2023-12-30 RX ADMIN — Medication 975 MILLIGRAM(S): at 23:54

## 2023-12-30 RX ADMIN — Medication 25 MILLIGRAM(S): at 05:54

## 2023-12-30 RX ADMIN — Medication 15 MILLIGRAM(S): at 06:46

## 2023-12-30 RX ADMIN — HEPARIN SODIUM 5000 UNIT(S): 5000 INJECTION INTRAVENOUS; SUBCUTANEOUS at 17:24

## 2023-12-30 RX ADMIN — ATORVASTATIN CALCIUM 40 MILLIGRAM(S): 80 TABLET, FILM COATED ORAL at 22:14

## 2023-12-30 RX ADMIN — VALSARTAN 160 MILLIGRAM(S): 80 TABLET ORAL at 05:53

## 2023-12-30 RX ADMIN — CEFTRIAXONE 100 MILLIGRAM(S): 500 INJECTION, POWDER, FOR SOLUTION INTRAMUSCULAR; INTRAVENOUS at 17:24

## 2023-12-30 NOTE — PROGRESS NOTE ADULT - SUBJECTIVE AND OBJECTIVE BOX
Patient is a 68y old  Male who presents with a chief complaint of right   flank pain (29 Dec 2023 18:29)      INTERVAL HPI/OVERNIGHT EVENTS:    MEDICATIONS  (STANDING):  amLODIPine   Tablet 5 milliGRAM(s) Oral daily  atorvastatin 40 milliGRAM(s) Oral at bedtime  cefTRIAXone   IVPB 1000 milliGRAM(s) IV Intermittent every 24 hours  heparin   Injectable 5000 Unit(s) SubCutaneous every 12 hours  hydrochlorothiazide 12.5 milliGRAM(s) Oral daily  influenza  Vaccine (HIGH DOSE) 0.7 milliLiter(s) IntraMuscular once  lactated ringers. 1000 milliLiter(s) (75 mL/Hr) IV Continuous <Continuous>  metoprolol succinate ER 25 milliGRAM(s) Oral daily  tamsulosin 0.4 milliGRAM(s) Oral at bedtime  valsartan 160 milliGRAM(s) Oral daily    MEDICATIONS  (PRN):  acetaminophen     Tablet .. 975 milliGRAM(s) Oral every 6 hours PRN Moderate Pain (4 - 6)  ketorolac   Injectable 15 milliGRAM(s) IV Push every 6 hours PRN Moderate Pain (4 - 6)      Allergies    morphine (Nausea)    Intolerances        REVIEW OF SYSTEMS: all negative with exception of above    Vital Signs Last 24 Hrs  T(C): 36.7 (30 Dec 2023 11:02), Max: 36.9 (29 Dec 2023 15:30)  T(F): 98.1 (30 Dec 2023 11:02), Max: 98.5 (29 Dec 2023 15:30)  HR: 69 (30 Dec 2023 11:02) (57 - 73)  BP: 133/73 (30 Dec 2023 11:02) (120/69 - 145/91)  BP(mean): --  RR: 18 (30 Dec 2023 11:02) (14 - 19)  SpO2: 99% (30 Dec 2023 11:02) (95% - 100%)    Parameters below as of 30 Dec 2023 11:02  Patient On (Oxygen Delivery Method): room air        PHYSICAL EXAM:  GENERAL: NAD, well-groomed, well-developed  HEAD:  Atraumatic, Normocephalic  EYES: EOMI, PERRLA, conjunctiva and sclera clear  ENMT: No tonsillar erythema, exudates, or enlargement; Moist mucous membranes, Good dentition, No lesions  NECK: Supple, No JVD, Normal thyroid  NERVOUS SYSTEM:  Alert & Oriented X3, Good concentration; Motor Strength 5/5 B/L upper and lower extremities; DTRs 2+ intact and symmetric  CHEST/LUNG: Clear to percussion bilaterally; No rales, rhonchi, wheezing, or rubs  HEART: Regular rate and rhythm; No murmurs, rubs, or gallops  ABDOMEN: Soft, Nontender, Nondistended; Bowel sounds present  EXTREMITIES:  2+ Peripheral Pulses, No clubbing, cyanosis, or edema  LYMPH: No lymphadenopathy noted  SKIN: No rashes or lesions    LABS:                        11.5   7.38  )-----------( 251      ( 30 Dec 2023 08:40 )             34.5     12-30    140  |  107  |  21  ----------------------------<  123<H>  3.9   |  26  |  0.93    Ca    8.8      30 Dec 2023 08:40    TPro  6.7  /  Alb  2.8<L>  /  TBili  0.4  /  DBili  x   /  AST  23  /  ALT  43  /  AlkPhos  102  12-30      Urinalysis Basic - ( 30 Dec 2023 08:40 )    Color: x / Appearance: x / SG: x / pH: x  Gluc: 123 mg/dL / Ketone: x  / Bili: x / Urobili: x   Blood: x / Protein: x / Nitrite: x   Leuk Esterase: x / RBC: x / WBC x   Sq Epi: x / Non Sq Epi: x / Bacteria: x      CAPILLARY BLOOD GLUCOSE          RADIOLOGY & ADDITIONAL TESTS:    Imaging Personally Reviewed:  [ ] YES  [ ] NO    Consultant(s) Notes Reviewed:  [ ] YES  [ ] NO    Care Discussed with Consultants/Other Providers [ ] YES  [ ] NO

## 2023-12-30 NOTE — PROGRESS NOTE ADULT - ASSESSMENT
68 years old male with h/o HTN, HLD, nephrolithiasis present to ED with complain of severe right flank pain radiate to right groin, associated with nausea and vomiting. Patient had ? lithotripsy procedure at [facility] yesterday. No fever or chills.   Hemodynamically stable, afebrile, sat well at [facility]. No leukocytosis, plt 214, K 3.6, Cr 1.2. CT abd/pelvis with 4mm calculus in the proximal right ureter. 3 mm calculus in the distal right ureter. 4 mm calculus at the right UVJ. Associated mild right hydroureteronephrosis. Small nonobstructive calculi in the right kidney and right renal pelvis measuring up to 9 mm.    Problem/Plan - 1:  ·  Severe right flank pain with multiple small right ureteral obstructive stones and associated right hydronephrosis and azotemia. patient s/p lithotripsy on 11/30/23. + hematuria (now hematuria has resolved).   cont iv dilaudid for severe pain and monitor for any oversedation.   cont IVF and flomax.   no evidence of any infection at this time. No antibiotics at this time.  - S/p Ureteroscopy, with laser lithotripsy and stent insertion  - D/c moreira. Will do TOV    Problem/Plan - 4:  ·  Problem: Benign essential HTN.   · controlled. cont to monitor.     Problem/Plan - 5:  ·  Problem: Hyperlipidemia, unspecified.   ·  Plan: on atorvastatin 20mg hs.    Anemia. normocytic. not blood loss.     Overweight. diet and exercise.     ERNESTO. Not much change in last 24 hours. Trend.    Full Code  SCDs   68 years old male with h/o HTN, HLD, nephrolithiasis present to ED with complain of severe right flank pain radiate to right groin, associated with nausea and vomiting. Patient had ? lithotripsy procedure at [facility] yesterday. No fever or chills.   Hemodynamically stable, afebrile, sat well at RA. No leukocytosis, plt 214, K 3.6, Cr 1.2. CT abd/pelvis with 4mm calculus in the proximal right ureter. 3 mm calculus in the distal right ureter. 4 mm calculus at the right UVJ. Associated mild right hydroureteronephrosis. Small nonobstructive calculi in the right kidney and right renal pelvis measuring up to 9 mm.    Problem/Plan - 1:  ·  Severe right flank pain with multiple small right ureteral obstructive stones and associated right hydronephrosis and azotemia. patient s/p lithotripsy on 11/30/23. + hematuria (now hematuria has resolved).   cont iv dilaudid for severe pain and monitor for any oversedation.   cont IVF and flomax.   no evidence of any infection at this time. No antibiotics at this time.  - S/p Ureteroscopy, with laser lithotripsy and stent insertion  - D/c moreira. Will do TOV    Problem/Plan - 4:  ·  Problem: Benign essential HTN.   · controlled. cont to monitor.     Problem/Plan - 5:  ·  Problem: Hyperlipidemia, unspecified.   ·  Plan: on atorvastatin 20mg hs.    Anemia. normocytic. not blood loss.     Overweight. diet and exercise.     ERNESTO. Not much change in last 24 hours. Trend.    Full Code  SCDs

## 2023-12-30 NOTE — PROGRESS NOTE ADULT - SUBJECTIVE AND OBJECTIVE BOX
Patient seen and examined bedside resting comfortably.  S/p TOV today, voiding without issue and feels well, eager to be discharged.  Admits to some dysuria.  Tolerating diet. No n/v, f/c.     T(F): 98.1 (12-30-23 @ 16:46), Max: 98.5 (12-30-23 @ 00:38)  HR: 62 (12-30-23 @ 16:46) (59 - 72)  BP: 146/79 (12-30-23 @ 16:46) (120/69 - 146/79)  RR: 18 (12-30-23 @ 16:46) (16 - 19)  SpO2: 98% (12-30-23 @ 16:46) (95% - 99%)  Wt(kg): --    PHYSICAL EXAM:  General: NAD, alert and awake  HEENT: NCAT, EOMI, conjunctiva clear  Chest: nonlabored respirations, good inspiratory effort  Abdomen: soft, NTND.   Extremities: no pedal edema or calf tenderness noted   : no suprapubic tenderness or bladder distention. Clear yellow urine in bedside urinal    LABS:                        11.5   7.38  )-----------( 251      ( 30 Dec 2023 08:40 )             34.5   12-30    140  |  107  |  21  ----------------------------<  123<H>  3.9   |  26  |  0.93    Ca    8.8      30 Dec 2023 08:40    TPro  6.7  /  Alb  2.8<L>  /  TBili  0.4  /  DBili  x   /  AST  23  /  ALT  43  /  AlkPhos  102  12-30    I&O's Detail    29 Dec 2023 07:01  -  30 Dec 2023 07:00  --------------------------------------------------------  IN:  Total IN: 0 mL    OUT:    Indwelling Catheter - Urethral (mL): 1000 mL  Total OUT: 1000 mL    Total NET: -1000 mL      30 Dec 2023 07:01  -  30 Dec 2023 18:14  --------------------------------------------------------  IN:    Oral Fluid: 237 mL  Total IN: 237 mL    OUT:    Indwelling Catheter - Urethral (mL): 600 mL    Voided (mL): 700 mL  Total OUT: 1300 mL    Total NET: -1063 mL      A/P: 68M PMH prostate ca (follows with his own doctor), HTN HLD nephrolithiasis h/o ESWL in past and stent 12/4/2023 with Dr Malone now POD#1 s/p Ureteroscopy, with laser lithotripsy and R stent insertion  voiding s/p TOV, PVR 10cc  per Dr Mlaone, patient is stable from urology standpoint for discharge home on ceftin x 2 days.  continue flomax  will require OP Follow up for stent removal in 3-4 weeks per Dr Malone, patient made aware  d/w primary team   Patient seen and examined bedside resting comfortably.  S/p TOV today, voiding without issue and feels well, eager to be discharged.  Admits to some dysuria.  Tolerating diet. No n/v, f/c.     T(F): 98.1 (12-30-23 @ 16:46), Max: 98.5 (12-30-23 @ 00:38)  HR: 62 (12-30-23 @ 16:46) (59 - 72)  BP: 146/79 (12-30-23 @ 16:46) (120/69 - 146/79)  RR: 18 (12-30-23 @ 16:46) (16 - 19)  SpO2: 98% (12-30-23 @ 16:46) (95% - 99%)  Wt(kg): --    PHYSICAL EXAM:  General: NAD, alert and awake  HEENT: NCAT, EOMI, conjunctiva clear  Chest: nonlabored respirations, good inspiratory effort  Abdomen: soft, NTND.   Extremities: no pedal edema or calf tenderness noted   : no suprapubic tenderness or bladder distention. Clear yellow urine in bedside urinal    LABS:                        11.5   7.38  )-----------( 251      ( 30 Dec 2023 08:40 )             34.5   12-30    140  |  107  |  21  ----------------------------<  123<H>  3.9   |  26  |  0.93    Ca    8.8      30 Dec 2023 08:40    TPro  6.7  /  Alb  2.8<L>  /  TBili  0.4  /  DBili  x   /  AST  23  /  ALT  43  /  AlkPhos  102  12-30    I&O's Detail    29 Dec 2023 07:01  -  30 Dec 2023 07:00  --------------------------------------------------------  IN:  Total IN: 0 mL    OUT:    Indwelling Catheter - Urethral (mL): 1000 mL  Total OUT: 1000 mL    Total NET: -1000 mL      30 Dec 2023 07:01  -  30 Dec 2023 18:14  --------------------------------------------------------  IN:    Oral Fluid: 237 mL  Total IN: 237 mL    OUT:    Indwelling Catheter - Urethral (mL): 600 mL    Voided (mL): 700 mL  Total OUT: 1300 mL    Total NET: -1063 mL      A/P: 68M PMH prostate ca (follows with his own doctor), HTN HLD nephrolithiasis h/o ESWL in past and stent 12/4/2023 with Dr Malone now POD#1 s/p Ureteroscopy, with laser lithotripsy and R stent insertion  voiding s/p TOV, PVR 10cc  per Dr Malone, patient is stable from urology standpoint for discharge home on ceftin x 2 days.  continue flomax  will require OP Follow up for stent removal in 3-4 weeks per Dr Malone, patient made aware  d/w primary team

## 2023-12-31 ENCOUNTER — TRANSCRIPTION ENCOUNTER (OUTPATIENT)
Age: 68
End: 2023-12-31

## 2023-12-31 VITALS
RESPIRATION RATE: 18 BRPM | SYSTOLIC BLOOD PRESSURE: 130 MMHG | TEMPERATURE: 98 F | DIASTOLIC BLOOD PRESSURE: 80 MMHG | HEART RATE: 65 BPM | OXYGEN SATURATION: 96 %

## 2023-12-31 PROCEDURE — 99239 HOSP IP/OBS DSCHRG MGMT >30: CPT

## 2023-12-31 RX ORDER — TAMSULOSIN HYDROCHLORIDE 0.4 MG/1
1 CAPSULE ORAL
Qty: 0 | Refills: 0 | DISCHARGE
Start: 2023-12-31

## 2023-12-31 RX ORDER — METOPROLOL TARTRATE 50 MG
1 TABLET ORAL
Qty: 30 | Refills: 0
Start: 2023-12-31 | End: 2024-01-29

## 2023-12-31 RX ADMIN — AMLODIPINE BESYLATE 5 MILLIGRAM(S): 2.5 TABLET ORAL at 05:19

## 2023-12-31 RX ADMIN — SODIUM CHLORIDE 75 MILLILITER(S): 9 INJECTION, SOLUTION INTRAVENOUS at 02:37

## 2023-12-31 RX ADMIN — HEPARIN SODIUM 5000 UNIT(S): 5000 INJECTION INTRAVENOUS; SUBCUTANEOUS at 05:20

## 2023-12-31 RX ADMIN — Medication 25 MILLIGRAM(S): at 05:20

## 2023-12-31 RX ADMIN — VALSARTAN 160 MILLIGRAM(S): 80 TABLET ORAL at 05:19

## 2023-12-31 NOTE — DISCHARGE NOTE NURSING/CASE MANAGEMENT/SOCIAL WORK - NSDCPEFALRISK_GEN_ALL_CORE
For information on Fall & Injury Prevention, visit: [email or web address] OR  [email or web address] OR  https://www.cdc.gov/steadi/patient.html For information on Fall & Injury Prevention, visit: [email or web address] OR  [email or web address] OR  https://www.cdc.gov/steadi/patient.html

## 2023-12-31 NOTE — DISCHARGE NOTE PROVIDER - PROVIDER TOKENS
FREE:[LAST:[PMD],PHONE:[(   )    -],FAX:[(   )    -],FOLLOWUP:[2 weeks]],PROVIDER:[TOKEN:[[id number]],FOLLOWUP:[1 week]] FREE:[LAST:[PMD],PHONE:[(   )    -],FAX:[(   )    -],FOLLOWUP:[2 weeks]],PROVIDER:[TOKEN:[[id number]],FOLLOWUP:[1 week]]

## 2023-12-31 NOTE — DISCHARGE NOTE PROVIDER - NSDCCPCAREPLAN_GEN_ALL_CORE_FT
PRINCIPAL DISCHARGE DIAGNOSIS  Diagnosis: Renal colic  Assessment and Plan of Treatment:      PRINCIPAL DISCHARGE DIAGNOSIS  Diagnosis: Obstructive uropathy  Assessment and Plan of Treatment: S/p lithotripsy and stent insertion. Will need outpatient follow up with urology.      SECONDARY DISCHARGE DIAGNOSES  Diagnosis: Acute kidney injury with acute tubular necrosis  Assessment and Plan of Treatment: in the setting of multuple obstructive stones

## 2023-12-31 NOTE — DISCHARGE NOTE PROVIDER - CARE PROVIDER_API CALL
PMD,   Phone: (   )    -  Fax: (   )    -  Follow Up Time: 2 weeks    Louis Malone  Urology  [address] [place] [address]  Phone: [phone]  Fax: [phone]  Follow Up Time: 1 week   PMD,   Phone: (   )    -  Fax: (   )    -  Follow Up Time: 2 weeks    Louis Malone  Urology  [address] [place] [address]  Phone: [phone]  Fax: [phone]  Follow Up Time: 1 week

## 2023-12-31 NOTE — DISCHARGE NOTE PROVIDER - ATTENDING DISCHARGE PHYSICAL EXAMINATION:
Vital Signs Last 24 Hrs  T(F): 97.7 (31 Dec 2023 12:04), Max: 98.1 (30 Dec 2023 16:46)  HR: 65 (31 Dec 2023 12:04) (58 - 65)  BP: 130/80 (31 Dec 2023 12:04) (130/79 - 146/79)  RR: 18 (31 Dec 2023 12:04) (17 - 18)  SpO2: 96% (31 Dec 2023 12:04) (96% - 98%)    Physical Exam:  Constitutional: NAD, awake and alert  Respiratory: cta b/l no wheezing no rhonchi  Cardiovascular: +s1/s2 no edema b/l le  Gastrointestinal: soft nt nd bs+  Vascular: 2+ peripheral pulses  Neurological: A/O x 3, no focal deficits

## 2023-12-31 NOTE — DISCHARGE NOTE PROVIDER - HOSPITAL COURSE
68 years old male with h/o HTN, HLD, nephrolithiasis present to ED with complain of severe right flank pain radiate to right groin. Patient had ? lithotripsy procedure at [facility] yesterday.   Admitted with kidney stones, seen by urology, s/p uteroscopy with laser lithotripsy and stent insertion on 12/29 by urology.   Pt voided and passed TOV.   Stable for discharge.      ·Severe right flank pain with multiple small right ureteral obstructive stones and associated right hydronephrosis and azotemia. patient s/p lithotripsy on 11/30/23 and uteroscopy 12/29    Benign essential HTN.: cont home meds    Hyperlipidemia, unspecified: Cont statin    Anemia. normocytic. not blood loss.     Overweight. diet and exercise.     ERNESTO 2/2 obstruction: improved     68 years old male with h/o HTN, HLD, nephrolithiasis present to ED with complain of severe right flank pain radiate to right groin. Patient had ? lithotripsy procedure at [facility] yesterday.   Admitted with kidney stones, seen by urology, s/p uteroscopy with laser lithotripsy and stent insertion on 12/29 by urology.   Pt voided and passed TOV.   Stable for discharge.      ·Severe right flank pain with multiple small right ureteral obstructive stones and associated right hydronephrosis and azotemia. patient s/p lithotripsy on 11/30/23 and uteroscopy 12/29    Benign essential HTN.: cont home meds    Hyperlipidemia, unspecified: Cont statin    Anemia. normocytic. not blood loss.     Overweight. diet and exercise.     ERNESTO 2/2 obstruction: improved     68 years old male with h/o HTN, HLD, nephrolithiasis present to ED with complain of severe right flank pain radiate to right groin. Patient had ? lithotripsy procedure at [facility] yesterday.   Admitted with kidney stones, seen by urology, s/p uteroscopy with laser lithotripsy and stent insertion on 12/29 by urology.   Pt voided and passed TOV.   Stable for discharge.      Severe right flank pain with multiple small right ureteral obstructive stones and associated right hydronephrosis and azotemia. patient s/p lithotripsy on 11/30/23 and uteroscopy 12/29    Benign essential HTN.: cont home meds    Hyperlipidemia, unspecified: Cont statin    Anemia. normocytic. not blood loss.     Overweight. diet and exercise.     ERNESTO 2/2 obstruction: improved     68 years old male with h/o HTN, HLD, nephrolithiasis present to ED with complain of severe right flank pain radiate to right groin. Patient had ? lithotripsy procedure at [facility] yesterday.   Admitted with kidney stones, seen by urology, s/p uteroscopy with laser lithotripsy and stent insertion on 12/29 by urology.   Pt voided and passed TOV.   Stable for discharge.      Severe right flank pain with multiple small right ureteral obstructive stones and associated right hydronephrosis and azotemia. patient s/p lithotripsy on 11/30/23 and uteroscopy 12/29    Benign essential HTN.: cont home meds    Hyperlipidemia, unspecified: Cont statin    Anemia. normocytic. not blood loss.     Overweight. diet and exercise.     ERNESTO 2/2 obstruction: improved

## 2023-12-31 NOTE — DISCHARGE NOTE PROVIDER - NSDCMRMEDTOKEN_GEN_ALL_CORE_FT
amLODIPine 10 mg oral tablet: 1 tab(s) orally once a day  atorvastatin 40 mg oral tablet: 1 tab(s) orally once a day (at bedtime)  hydroCHLOROthiazide 12.5 mg oral capsule: 1 cap(s) orally once a day  tamsulosin 0.4 mg oral capsule: 1 cap(s) orally once a day (at bedtime)  valsartan 320 mg oral tablet: 1 tab(s) orally once a day   amLODIPine 10 mg oral tablet: 1 tab(s) orally once a day  atorvastatin 40 mg oral tablet: 1 tab(s) orally once a day (at bedtime)  hydroCHLOROthiazide 12.5 mg oral capsule: 1 cap(s) orally once a day  metoprolol succinate 25 mg oral tablet, extended release: 1 tab(s) orally once a day  tamsulosin 0.4 mg oral capsule: 1 cap(s) orally once a day (at bedtime)  valsartan 320 mg oral tablet: 1 tab(s) orally once a day

## 2023-12-31 NOTE — DISCHARGE NOTE NURSING/CASE MANAGEMENT/SOCIAL WORK - NSDCVIVACCINE_GEN_ALL_CORE_FT
Tdap; 09-May-2019 17:12; Jeannie Matthews (RN); [employer]; [id number] (Exp. Date: 26-Feb-2021); IntraMuscular; Deltoid Left.; 0.5 milliLiter(s); VIS (VIS Published: 09-May-2013, VIS Presented: 09-May-2019);    Tdap; 09-May-2019 17:12; Jeannie Matthews (RN); [employer]; [id number] (Exp. Date: 26-Feb-2021); IntraMuscular; Deltoid Left.; 0.5 milliLiter(s); VIS (VIS Published: 09-May-2013, VIS Presented: 09-May-2019);

## 2023-12-31 NOTE — CHART NOTE - NSCHARTNOTEFT_GEN_A_CORE
Work Letter     To Whom It May Concern,    Please excuse Mr Johan Chavez from work, as he was admitted on Dec 28, 2023, treated in [facility]. Now being discharged on 12/31/2023.  Any further questions or concerns please use contact info below.    Xiomara Aiken PA-C  Internal Medicine  [address] [place] [address] [phone] Work Letter     To Whom It May Concern,    Please excuse Mr Johan Chavez from work, as he was admitted on Dec 28, 2023, treated in [facility]. Now being discharged on 12/31/2023.  Any further questions or concerns please use contact info below.    Xiomara Aiken PA-C  Internal Medicine  [address] [place] [address] [phone]

## 2023-12-31 NOTE — DISCHARGE NOTE NURSING/CASE MANAGEMENT/SOCIAL WORK - PATIENT PORTAL LINK FT
You can access the FollowMyHealth Patient Portal offered by [facility] by registering at the following website: [email or web address]. By joining [employer]’s FollowMyHealth portal, you will also be able to view your health information using other applications (apps) compatible with our system. You can access the FollowMyHealth Patient Portal offered by [facility] by registering at the following website: [email or web address]. By joining [employer]’s FollowMyHealth portal, you will also be able to view your health information using other applications (apps) compatible with our system.

## 2024-01-03 NOTE — ED ADULT NURSE NOTE - CHPI ED NUR TIMING2
Patient verbalizes feeling better, CC improvement, or symptoms not worsening at time of discharge. Pain reported as 0/10 at time of discharge This RN thoroughly reviewed discharge instructions with the patient being able to verbalize understanding.  VSS & PIV removed (n/a) and pt ambulatory with steady gait prior to discharge.  Patient discharged with family})     
sudden onset

## 2024-01-05 DIAGNOSIS — D64.9 ANEMIA, UNSPECIFIED: ICD-10-CM

## 2024-01-05 DIAGNOSIS — Z85.46 PERSONAL HISTORY OF MALIGNANT NEOPLASM OF PROSTATE: ICD-10-CM

## 2024-01-05 DIAGNOSIS — N13.2 HYDRONEPHROSIS WITH RENAL AND URETERAL CALCULOUS OBSTRUCTION: ICD-10-CM

## 2024-01-05 DIAGNOSIS — I10 ESSENTIAL (PRIMARY) HYPERTENSION: ICD-10-CM

## 2024-01-05 DIAGNOSIS — E78.5 HYPERLIPIDEMIA, UNSPECIFIED: ICD-10-CM

## 2024-01-05 DIAGNOSIS — F10.10 ALCOHOL ABUSE, UNCOMPLICATED: ICD-10-CM

## 2024-01-05 DIAGNOSIS — Z88.5 ALLERGY STATUS TO NARCOTIC AGENT: ICD-10-CM

## 2024-01-05 DIAGNOSIS — E87.6 HYPOKALEMIA: ICD-10-CM

## 2024-01-05 DIAGNOSIS — R30.0 DYSURIA: ICD-10-CM

## 2024-01-05 DIAGNOSIS — E66.3 OVERWEIGHT: ICD-10-CM

## 2024-01-05 DIAGNOSIS — Z96.641 PRESENCE OF RIGHT ARTIFICIAL HIP JOINT: ICD-10-CM

## 2024-01-05 DIAGNOSIS — N17.9 ACUTE KIDNEY FAILURE, UNSPECIFIED: ICD-10-CM

## 2024-01-05 LAB
SURGICAL PATHOLOGY STUDY: SIGNIFICANT CHANGE UP
SURGICAL PATHOLOGY STUDY: SIGNIFICANT CHANGE UP

## 2024-01-17 ENCOUNTER — APPOINTMENT (OUTPATIENT)
Dept: UROLOGY | Facility: CLINIC | Age: 69
End: 2024-01-17
Payer: MEDICARE

## 2024-01-17 VITALS
TEMPERATURE: 98.2 F | SYSTOLIC BLOOD PRESSURE: 99 MMHG | OXYGEN SATURATION: 98 % | DIASTOLIC BLOOD PRESSURE: 61 MMHG | HEART RATE: 74 BPM

## 2024-01-17 DIAGNOSIS — Z63.4 DISAPPEARANCE AND DEATH OF FAMILY MEMBER: ICD-10-CM

## 2024-01-17 DIAGNOSIS — Z80.42 FAMILY HISTORY OF MALIGNANT NEOPLASM OF PROSTATE: ICD-10-CM

## 2024-01-17 DIAGNOSIS — Z84.1 FAMILY HISTORY OF DISORDERS OF KIDNEY AND URETER: ICD-10-CM

## 2024-01-17 PROCEDURE — 99213 OFFICE O/P EST LOW 20 MIN: CPT | Mod: 25

## 2024-01-17 PROCEDURE — 52315 CYSTOSCOPY AND TREATMENT: CPT

## 2024-01-17 SDOH — SOCIAL STABILITY - SOCIAL INSECURITY: DISSAPEARANCE AND DEATH OF FAMILY MEMBER: Z63.4

## 2024-01-17 NOTE — REVIEW OF SYSTEMS
[Chills] : chills [see HPI] : see HPI [Poor quality erections] : Poor quality erections [Negative] : Heme/Lymph

## 2024-01-17 NOTE — HISTORY OF PRESENT ILLNESS
[FreeTextEntry1] : 12/26/2023: Mr. SALDAÑA is a 68 year male who presents today for multiple right ureteral stones. S/P ESWL of right renal pelvic stone.   ESWL of right renal pelvic stone subsequently multiple ureteral stones in right ureteral. On 12/5/23 had ureteral lithotripsy and insertion of stent. More stones were present and will require ureteroscopy to remove these stones.   Patient marked urgency and frequency secondary to right ureteral stent.  since Sunday, Duran fever. Burning and frequent urination is most likely secondary to stent. Will collect urine for Urinalysis and Culture. Will collect urine to r/o infection. Will get UA/Culture.  Recommended to continue taking pain medication, and if worsens to go to the ER.   O/E: circumcised phallus, adequate meatus, both testes descended, nontender, no mass palpable.  will schedule for right ureterolithotripsy and removal of stones.      01/17/2024: Mr. SALDAÑA is a 68 year male who presents today for a follow up for   Pt had right mid pole staghorn stone. Was treated with ESWL by another urologist.  On 12/1/23 came to [facility] with severe renal colic. found to have multiple ureteral stones and stones in the mid-pole and lower right kidney. some of the stones were removed and stent was inserted. On 12/29/23 pt was readmitted. Ureteroscopy was done. Stones from ureter were removed. Stones in middle and lower pole were dusted and stent was inserted. today stent was removed. stones were composed of calcium oxalate monohydrate stone. will get litholink to analysis   CA prostate: treated with radiation for 5 days will follow PSA.   RTC: 1 month for renal sonogram , PSA possible litholink.

## 2024-01-21 ENCOUNTER — INPATIENT (INPATIENT)
Facility: HOSPITAL | Age: 69
LOS: 2 days | Discharge: HOME | End: 2024-01-24
Attending: HOSPITALIST | Admitting: HOSPITALIST
Payer: MEDICARE

## 2024-01-21 VITALS
HEIGHT: 77 IN | RESPIRATION RATE: 19 BRPM | TEMPERATURE: 98 F | HEART RATE: 84 BPM | SYSTOLIC BLOOD PRESSURE: 140 MMHG | WEIGHT: 216.93 LBS | OXYGEN SATURATION: 96 % | DIASTOLIC BLOOD PRESSURE: 74 MMHG

## 2024-01-21 DIAGNOSIS — I10 ESSENTIAL (PRIMARY) HYPERTENSION: ICD-10-CM

## 2024-01-21 DIAGNOSIS — N20.0 CALCULUS OF KIDNEY: ICD-10-CM

## 2024-01-21 DIAGNOSIS — J06.9 ACUTE UPPER RESPIRATORY INFECTION, UNSPECIFIED: ICD-10-CM

## 2024-01-21 DIAGNOSIS — Z96.641 PRESENCE OF RIGHT ARTIFICIAL HIP JOINT: Chronic | ICD-10-CM

## 2024-01-21 LAB
ALBUMIN SERPL ELPH-MCNC: 2.9 G/DL — LOW (ref 3.3–5)
ALP SERPL-CCNC: 114 U/L — SIGNIFICANT CHANGE UP (ref 40–120)
ALT FLD-CCNC: 139 U/L — HIGH (ref 12–78)
ANION GAP SERPL CALC-SCNC: 7 MMOL/L — SIGNIFICANT CHANGE UP (ref 5–17)
APPEARANCE UR: ABNORMAL
AST SERPL-CCNC: 108 U/L — HIGH (ref 15–37)
BACTERIA # UR AUTO: ABNORMAL /HPF
BASOPHILS # BLD AUTO: 0.02 K/UL — SIGNIFICANT CHANGE UP (ref 0–0.2)
BASOPHILS NFR BLD AUTO: 0.1 % — SIGNIFICANT CHANGE UP (ref 0–2)
BILIRUB SERPL-MCNC: 0.7 MG/DL — SIGNIFICANT CHANGE UP (ref 0.2–1.2)
BILIRUB UR-MCNC: NEGATIVE — SIGNIFICANT CHANGE UP
BUN SERPL-MCNC: 20 MG/DL — SIGNIFICANT CHANGE UP (ref 7–23)
CALCIUM SERPL-MCNC: 8.9 MG/DL — SIGNIFICANT CHANGE UP (ref 8.5–10.1)
CHLORIDE SERPL-SCNC: 104 MMOL/L — SIGNIFICANT CHANGE UP (ref 96–108)
CO2 SERPL-SCNC: 28 MMOL/L — SIGNIFICANT CHANGE UP (ref 22–31)
COLOR SPEC: YELLOW — SIGNIFICANT CHANGE UP
CREAT SERPL-MCNC: 1.11 MG/DL — SIGNIFICANT CHANGE UP (ref 0.5–1.3)
DIFF PNL FLD: ABNORMAL
EGFR: 73 ML/MIN/1.73M2 — SIGNIFICANT CHANGE UP
EOSINOPHIL # BLD AUTO: 0 K/UL — SIGNIFICANT CHANGE UP (ref 0–0.5)
EOSINOPHIL NFR BLD AUTO: 0 % — SIGNIFICANT CHANGE UP (ref 0–6)
EPI CELLS # UR: PRESENT
GLUCOSE SERPL-MCNC: 148 MG/DL — HIGH (ref 70–99)
GLUCOSE UR QL: NEGATIVE MG/DL — SIGNIFICANT CHANGE UP
HCT VFR BLD CALC: 34.7 % — LOW (ref 39–50)
HGB BLD-MCNC: 11.5 G/DL — LOW (ref 13–17)
IMM GRANULOCYTES NFR BLD AUTO: 0.5 % — SIGNIFICANT CHANGE UP (ref 0–0.9)
KETONES UR-MCNC: NEGATIVE MG/DL — SIGNIFICANT CHANGE UP
LACTATE SERPL-SCNC: 1.6 MMOL/L — SIGNIFICANT CHANGE UP (ref 0.7–2)
LEUKOCYTE ESTERASE UR-ACNC: ABNORMAL
LYMPHOCYTES # BLD AUTO: 1.29 K/UL — SIGNIFICANT CHANGE UP (ref 1–3.3)
LYMPHOCYTES # BLD AUTO: 9.1 % — LOW (ref 13–44)
MCHC RBC-ENTMCNC: 28.6 PG — SIGNIFICANT CHANGE UP (ref 27–34)
MCHC RBC-ENTMCNC: 33.1 G/DL — SIGNIFICANT CHANGE UP (ref 32–36)
MCV RBC AUTO: 86.3 FL — SIGNIFICANT CHANGE UP (ref 80–100)
MONOCYTES # BLD AUTO: 1.36 K/UL — HIGH (ref 0–0.9)
MONOCYTES NFR BLD AUTO: 9.6 % — SIGNIFICANT CHANGE UP (ref 2–14)
NEUTROPHILS # BLD AUTO: 11.48 K/UL — HIGH (ref 1.8–7.4)
NEUTROPHILS NFR BLD AUTO: 80.7 % — HIGH (ref 43–77)
NITRITE UR-MCNC: NEGATIVE — SIGNIFICANT CHANGE UP
NRBC # BLD: 0 /100 WBCS — SIGNIFICANT CHANGE UP (ref 0–0)
PH UR: 5.5 — SIGNIFICANT CHANGE UP (ref 5–8)
PLATELET # BLD AUTO: 187 K/UL — SIGNIFICANT CHANGE UP (ref 150–400)
POTASSIUM SERPL-MCNC: 3.8 MMOL/L — SIGNIFICANT CHANGE UP (ref 3.5–5.3)
POTASSIUM SERPL-SCNC: 3.8 MMOL/L — SIGNIFICANT CHANGE UP (ref 3.5–5.3)
PROT SERPL-MCNC: 7.5 GM/DL — SIGNIFICANT CHANGE UP (ref 6–8.3)
PROT UR-MCNC: 100 MG/DL
RAPID RVP RESULT: SIGNIFICANT CHANGE UP
RBC # BLD: 4.02 M/UL — LOW (ref 4.2–5.8)
RBC # FLD: 14 % — SIGNIFICANT CHANGE UP (ref 10.3–14.5)
RBC CASTS # UR COMP ASSIST: SIGNIFICANT CHANGE UP /HPF (ref 0–4)
SARS-COV-2 RNA SPEC QL NAA+PROBE: SIGNIFICANT CHANGE UP
SODIUM SERPL-SCNC: 139 MMOL/L — SIGNIFICANT CHANGE UP (ref 135–145)
SP GR SPEC: 1.02 — SIGNIFICANT CHANGE UP (ref 1–1.03)
UROBILINOGEN FLD QL: 1 MG/DL — SIGNIFICANT CHANGE UP (ref 0.2–1)
WBC # BLD: 14.22 K/UL — HIGH (ref 3.8–10.5)
WBC # FLD AUTO: 14.22 K/UL — HIGH (ref 3.8–10.5)
WBC UR QL: SIGNIFICANT CHANGE UP /HPF (ref 0–5)

## 2024-01-21 PROCEDURE — 74177 CT ABD & PELVIS W/CONTRAST: CPT | Mod: 26,MC

## 2024-01-21 PROCEDURE — 99223 1ST HOSP IP/OBS HIGH 75: CPT

## 2024-01-21 PROCEDURE — 71045 X-RAY EXAM CHEST 1 VIEW: CPT | Mod: 26

## 2024-01-21 PROCEDURE — 93010 ELECTROCARDIOGRAM REPORT: CPT

## 2024-01-21 PROCEDURE — 99285 EMERGENCY DEPT VISIT HI MDM: CPT

## 2024-01-21 RX ORDER — ACETAMINOPHEN 500 MG
650 TABLET ORAL EVERY 6 HOURS
Refills: 0 | Status: DISCONTINUED | OUTPATIENT
Start: 2024-01-21 | End: 2024-01-24

## 2024-01-21 RX ORDER — VALSARTAN 80 MG/1
320 TABLET ORAL DAILY
Refills: 0 | Status: DISCONTINUED | OUTPATIENT
Start: 2024-01-21 | End: 2024-01-24

## 2024-01-21 RX ORDER — PIPERACILLIN AND TAZOBACTAM 4; .5 G/20ML; G/20ML
3.38 INJECTION, POWDER, LYOPHILIZED, FOR SOLUTION INTRAVENOUS EVERY 8 HOURS
Refills: 0 | Status: COMPLETED | OUTPATIENT
Start: 2024-01-21 | End: 2024-01-22

## 2024-01-21 RX ORDER — KETOROLAC TROMETHAMINE 30 MG/ML
15 SYRINGE (ML) INJECTION ONCE
Refills: 0 | Status: DISCONTINUED | OUTPATIENT
Start: 2024-01-21 | End: 2024-01-21

## 2024-01-21 RX ORDER — TAMSULOSIN HYDROCHLORIDE 0.4 MG/1
0.4 CAPSULE ORAL AT BEDTIME
Refills: 0 | Status: DISCONTINUED | OUTPATIENT
Start: 2024-01-21 | End: 2024-01-24

## 2024-01-21 RX ORDER — AMLODIPINE BESYLATE 2.5 MG/1
10 TABLET ORAL DAILY
Refills: 0 | Status: DISCONTINUED | OUTPATIENT
Start: 2024-01-21 | End: 2024-01-24

## 2024-01-21 RX ORDER — SODIUM CHLORIDE 9 MG/ML
1000 INJECTION INTRAMUSCULAR; INTRAVENOUS; SUBCUTANEOUS ONCE
Refills: 0 | Status: COMPLETED | OUTPATIENT
Start: 2024-01-21 | End: 2024-01-21

## 2024-01-21 RX ORDER — ONDANSETRON 8 MG/1
4 TABLET, FILM COATED ORAL ONCE
Refills: 0 | Status: COMPLETED | OUTPATIENT
Start: 2024-01-21 | End: 2024-01-21

## 2024-01-21 RX ORDER — ONDANSETRON 8 MG/1
4 TABLET, FILM COATED ORAL EVERY 8 HOURS
Refills: 0 | Status: DISCONTINUED | OUTPATIENT
Start: 2024-01-21 | End: 2024-01-24

## 2024-01-21 RX ORDER — SODIUM CHLORIDE 9 MG/ML
1000 INJECTION, SOLUTION INTRAVENOUS
Refills: 0 | Status: DISCONTINUED | OUTPATIENT
Start: 2024-01-21 | End: 2024-01-24

## 2024-01-21 RX ORDER — SODIUM CHLORIDE 9 MG/ML
1000 INJECTION INTRAMUSCULAR; INTRAVENOUS; SUBCUTANEOUS
Refills: 0 | Status: DISCONTINUED | OUTPATIENT
Start: 2024-01-21 | End: 2024-01-21

## 2024-01-21 RX ORDER — LANOLIN ALCOHOL/MO/W.PET/CERES
3 CREAM (GRAM) TOPICAL AT BEDTIME
Refills: 0 | Status: DISCONTINUED | OUTPATIENT
Start: 2024-01-21 | End: 2024-01-24

## 2024-01-21 RX ORDER — METOPROLOL TARTRATE 50 MG
25 TABLET ORAL DAILY
Refills: 0 | Status: DISCONTINUED | OUTPATIENT
Start: 2024-01-21 | End: 2024-01-24

## 2024-01-21 RX ORDER — ATORVASTATIN CALCIUM 80 MG/1
40 TABLET, FILM COATED ORAL AT BEDTIME
Refills: 0 | Status: DISCONTINUED | OUTPATIENT
Start: 2024-01-21 | End: 2024-01-24

## 2024-01-21 RX ORDER — FAMOTIDINE 10 MG/ML
20 INJECTION INTRAVENOUS ONCE
Refills: 0 | Status: COMPLETED | OUTPATIENT
Start: 2024-01-21 | End: 2024-01-21

## 2024-01-21 RX ORDER — PIPERACILLIN AND TAZOBACTAM 4; .5 G/20ML; G/20ML
3.38 INJECTION, POWDER, LYOPHILIZED, FOR SOLUTION INTRAVENOUS ONCE
Refills: 0 | Status: COMPLETED | OUTPATIENT
Start: 2024-01-21 | End: 2024-01-21

## 2024-01-21 RX ADMIN — SODIUM CHLORIDE 1000 MILLILITER(S): 9 INJECTION INTRAMUSCULAR; INTRAVENOUS; SUBCUTANEOUS at 14:13

## 2024-01-21 RX ADMIN — Medication 15 MILLIGRAM(S): at 16:26

## 2024-01-21 RX ADMIN — PIPERACILLIN AND TAZOBACTAM 200 GRAM(S): 4; .5 INJECTION, POWDER, LYOPHILIZED, FOR SOLUTION INTRAVENOUS at 17:57

## 2024-01-21 RX ADMIN — SODIUM CHLORIDE 1000 MILLILITER(S): 9 INJECTION INTRAMUSCULAR; INTRAVENOUS; SUBCUTANEOUS at 13:14

## 2024-01-21 RX ADMIN — SODIUM CHLORIDE 120 MILLILITER(S): 9 INJECTION, SOLUTION INTRAVENOUS at 17:57

## 2024-01-21 RX ADMIN — ONDANSETRON 4 MILLIGRAM(S): 8 TABLET, FILM COATED ORAL at 13:15

## 2024-01-21 RX ADMIN — FAMOTIDINE 20 MILLIGRAM(S): 10 INJECTION INTRAVENOUS at 13:15

## 2024-01-21 RX ADMIN — Medication 15 MILLIGRAM(S): at 16:41

## 2024-01-21 NOTE — CONSULT NOTE ADULT - SUBJECTIVE AND OBJECTIVE BOX
Chief Complaint:  Patient is a 68y old  Male who presents with a chief complaint of chills, fatigue    HPI: 68M PMH prostate ca, HTN HLD nephrolithiasis h/o ESWL in past and stent, had recent 23 right ureteroscopy, removal of multiple ureteral stones and multiple renal stones using laser, insertion of stent.  Stent removed recently in office.  Pt denies any abdomen or flank pain, urinary symptoms, but c/o 2 days of chills, fatigue, generalized weakness.          PMH/PSH:PAST MEDICAL & SURGICAL HISTORY:  Hypertension      Prostate cancer      Alcohol abuse, daily use      S/P hip replacement, right          Allergies:  morphine (Nausea)      Medications:  lactated ringers. 1000 milliLiter(s) IV Continuous <Continuous>  piperacillin/tazobactam IVPB. 3.375 Gram(s) IV Intermittent Once  piperacillin/tazobactam IVPB.. 3.375 Gram(s) IV Intermittent Once      REVIEW OF SYSTEMS:  All other review of systems is negative unless indicated above.    Relevant Family History:   FAMILY HISTORY:  FH: prostate cancer (Father, Sibling)        Relevant Social History:  Denies ETOH or tobacco history    Physical Exam:    Vital Signs:  Vital Signs Last 24 Hrs  T(C): 36.4 (2024 15:12), Max: 36.4 (2024 12:33)  T(F): 97.5 (2024 15:12), Max: 97.6 (2024 12:33)  HR: 79 (2024 15:12) (79 - 84)  BP: 134/80 (2024 15:12) (134/80 - 140/74)  BP(mean): --  RR: 24 (2024 15:12) (19 - 24)  SpO2: 95% (2024 15:12) (95% - 96%)    Parameters below as of 2024 15:12  Patient On (Oxygen Delivery Method): room air      Daily Height in cm: 195.58 (2024 12:33)    Daily     Constitutional: pt resting comfortably in bed; is shivering under blankets  Respiratory: normal work of breathing  Cardiac: RRR  Gastrointestinal: soft, NT/ND; no rebound or guarding; no CVA tenderness   Extremities: , no clubbing or cyanosis; no peripheral edema  Musculoskeletal:  no joint swelling, tenderness or erythema  Skin: warm, dry  Neurologic: AAOx3      Laboratory:                          11.5   14.22 )-----------( 187      ( 2024 13:29 )             34.7         139  |  104  |  20  ----------------------------<  148<H>  3.8   |  28  |  1.11    Color: Yellow / Appearance: Cloudy / S.024 / pH: x  Gluc: x / Ketone: Negative mg/dL  / Bili: Negative / Urobili: 1.0 mg/dL   Blood: x / Protein: 100 mg/dL / Nitrite: Negative   Leuk Esterase: Trace / RBC: 3-5 /HPF / WBC 6-10 /HPF   Sq Epi: x / Non Sq Epi: x / Bacteria: Many /HPF      Imaging:    < from: CT Abdomen and Pelvis w/ IV Cont (24 @ 15:27) >  IMPRESSION:  Moderate right hydroureteronephrosis secondary to obstructive calculi in   the distal ureter.    Short segment stricture in the high rectum for which neoplasm is not   excluded. Suggest colonoscopic correlation.    < end of copied text >

## 2024-01-21 NOTE — ED ADULT NURSE NOTE - OBJECTIVE STATEMENT
pt BIBA from home c/o cough, congestion, NVD, HA and dizziness x3 days. pt stated he pete been around sick persons within the last three weeks. pt denies taking any OTC medication for his symptoms.   HX ETOH abuse, prostate CA pt BIBA from home c/o cough, congestion, NVD, HA and dizziness x3 days. pt stated he pete been around sick persons within the last three weeks. pt denies taking any OTC medication for his symptoms. pt has a HX of ETOH abuse but stated his last drink was over a month ago.  HX ETOH abuse, prostate CA.

## 2024-01-21 NOTE — H&P ADULT - PROBLEM SELECTOR PLAN 1
History of Nephrolithiasis  Recent right ureteroscopy (12/29/23)  CT-Abd; Moderate right hydroureteronephrosis secondary to obstructive calculi in the distal ureter.  Urology consulted   - NPO after midnight for OR tomorrow   - IVF  - Zosyn   - Pain control  - Tamsulosin  - DVT prophylaxis

## 2024-01-21 NOTE — CONSULT NOTE ADULT - ASSESSMENT
Appointment Scheduled   68M PMH prostate ca, HTN HLD nephrolithiasis with recent ureteroscopy, lithotripsy, removal of stones and insertion of stent.  Stent removed subsequently in office.  Here today with chills, weakness  Leukocytosis  admit to medicine  blood cx, urine cx  strain urine  start flomax 0.4mg qs  start zosyn q8h  NPO after midnight  Pt will need OR tomorrow for repeat ureteroscopy, stent

## 2024-01-21 NOTE — ED PROVIDER NOTE - PHYSICAL EXAMINATION
GEN: Awake, alert, interactive, NAD.  HEAD AND NECK: NC/AT. Airway patent. Neck supple.   EYES:  Clear b/l. EOMI. PERRL.   ENT: Dry mucus membranes.   CARDIAC: Regular rate, regular rhythm. No evident pedal edema.    RESP/CHEST: Normal respiratory effort with no use of accessory muscles or retractions. Clear throughout on auscultation.  ABD: Soft, non-distended, non-tender. No rebound, no guarding.   BACK: No midline spinal TTP. No CVAT.   EXTREMITIES: Moving all extremities with no apparent deformities.   SKIN: Warm, dry, intact normal color. No rash.   NEURO: AOx3, CN II-XII grossly intact, no focal deficits.   PSYCH: Appropriate mood and affect.

## 2024-01-21 NOTE — H&P ADULT - NSHPPHYSICALEXAM_GEN_ALL_CORE
GENERAL: NAD, comfortable at bedside   HEAD:  Atraumatic, Normocephalic  EYES: EOMI, PERRL, conjunctiva and sclera clear  NECK: Supple, No JVD  CHEST/LUNG: Clear to auscultation bilaterally; No wheezes, rales or rhonchi  HEART: Regular rate and rhythm; No murmurs, rubs, or gallops, (+)S1, S2  ABDOMEN: Soft, Nontender, Nondistended; Normal Bowel sounds   EXTREMITIES:  2+ Peripheral Pulses, No clubbing, cyanosis, or edema. R-ankle monitor  : CVA tenderness (R)   PSYCH: normal mood and affect  NEUROLOGY: AAOx3, non-focal  SKIN: No rashes or lesions

## 2024-01-21 NOTE — ED PROVIDER NOTE - CLINICAL SUMMARY MEDICAL DECISION MAKING FREE TEXT BOX
68M PMH HTN, HLD, hx renal stones, prostate CA, EtOH abuse BIBEMS d/t flu-like symptoms x3 days: h/a, congestion, cough, N/V/D. Afebrile, VSS. Exam as noted in PE. ECG NSR w/o evidence acute ischemia. Plan for CBC, CMP, lactate, CXR, RVP, UA/C +/- CT AP. Give IVF, Pepcid, Zofran. Re-eval. 68M PMH HTN, HLD, hx renal stones, prostate CA, EtOH abuse BIBEMS d/t flu-like symptoms x3 days: h/a, congestion, cough, N/V/D. Afebrile, VSS. Exam as noted in PE. ECG NSR w/o evidence acute ischemia. Plan for CBC, CMP, lactate, CXR, RVP, UA/C +/- CT AP. Give IVF, Pepcid, Zofran. Re-eval.  W/u significant for: + leukocytosis to 14.2, UA w/ + infection, CT AP w/ moderate R hydro 2/2 obstructive calculi w/in distal ureter. Urology consulted, evaluated pt in ED, request admission to medicine. Will admit to medicine (d/w Dr Snow). Pt updated to results, admission. He understands / agrees w/ this plan.

## 2024-01-21 NOTE — ED ADULT TRIAGE NOTE - CHIEF COMPLAINT QUOTE
BIBA- from home  cough, congestion, Nausea, HA and dizziness x3 days  HX BIBA- from home  cough, congestion, Nausea, HA and dizziness x3 days  HX ETOH abuse, prostate CA

## 2024-01-21 NOTE — ED PROVIDER NOTE - OBJECTIVE STATEMENT
68M PMH HTN, HLD, hx renal stones, prostate CA, EtOH abuse BIBEMS d/t flu-like symptoms x3 days. Per triage note, pt w/ h/a, congestion, cough, + sick contacts. Pt endorsing shaking chills, N/V/D. Pt denies fever, CP, SOB, dysuria, hematuria, black or bloody BMs. Pt endorses upset stomach & R flank pain. Pt reports taking Jeannie [place] w/o relief. Pt states R uretal stent removed in office last Thursday.     PMH as above, PSH non-contributory, Allergy morphine, Meds as listed. 68M PMH HTN, HLD, hx renal stones, prostate CA, EtOH abuse BIBEMS d/t flu-like symptoms x3 days. Per triage note, pt w/ h/a, congestion, cough, + sick contacts. Pt endorsing shaking chills, N/V/D. Pt denies fever, CP, SOB, dysuria, hematuria, black or bloody BMs. Pt endorses upset stomach & R flank pain. Pt reports taking Jeannie [place] w/o relief. Pt states R uretal stent removed in office last Thursday. Pt reports last drink was >1mo ago.     PMH as above, PSH non-contributory, Allergy morphine, Meds as listed.

## 2024-01-21 NOTE — H&P ADULT - NSHPLABSRESULTS_GEN_ALL_CORE
11.5   14.22 )-----------( 187      ( 2024 13:29 )             34.7     139  |  104  |  20  ----------------------------<  148<H>       3.8   |  28  |  1.11    Ca    8.9      2024 13:29    TPro  7.5  /  Alb  2.9<L>  /  TBili  0.7  /  DBili  x   /  AST  108<H>  /  ALT  139<H>  /  AlkPhos  114          Urinalysis Basic - ( 2024 14:54 )  Color: Yellow / Appearance: Cloudy / S.024 / pH: 5.5  Gluc: Negative mg/dL / Ketone: Negative mg/dL  / Bili: Negative / Urobili: 1.0 mg/dL   Blood: Small / Protein: 100 mg/dL / Nitrite: Negative   Leuk Esterase: Trace / RBC: 3-5 /HPF / WBC 6-10 /HPF   Sq Epi: Present / Non Sq Epi: x / Bacteria: Many /HPF      CT-abd  LOWER CHEST: Within normal limits.    LIVER: Within normal limits.  BILE DUCTS: Normal caliber.  GALLBLADDER: Cholecystectomy.  SPLEEN: Within normal limits.  PANCREAS: Within normal limits.  ADRENALS: Within normal limits.  KIDNEYS/URETERS: Moderate right hydroureteronephrosis with several calculi in the distal ureter measuring up to 5 mm.    BLADDER: Air in bladder lumen. Mural thickening and perivesicular soft tissue infiltration.  REPRODUCTIVE ORGANS: Prostate is enlarged.    BOWEL: No bowel obstruction. Short segment stricture with mural thickening in the high rectum (2, 128). Appendix is normal.  PERITONEUM: No ascites.  VESSELS: Within normal limits.  RETROPERITONEUM/LYMPH NODES: No lymphadenopathy.  ABDOMINAL WALL: Within normal limits.  BONES: Right hip arthroplasty. Disc degeneration lumbosacral junction.    IMPRESSION:  Moderate right hydroureteronephrosis secondary to obstructive calculi in the distal ureter.    Short segment stricture in the high rectum for which neoplasm is not excluded. Suggest colonoscopic correlation.

## 2024-01-21 NOTE — H&P ADULT - HISTORY OF PRESENT ILLNESS
68 year old male with a PMH of HTN, HLD, prostate CA, EtOH abuse (last drink 1 month ago), nephrolithiasis h/o ESWL in past and stent, had recent right ureteroscopy (12/29/23)  BIBEMS for flu like symptoms.  Endorses 3 days history of congestion, cough, shaking chills, N/V/D + sick contacts ().  Also endorses R-flank pain. Denies fever, chest pain, SOB, dysuria, hematuria. Labs remarkable for WBC: 14.22, UA(+).   CT-Abd; Moderate right hydroureteronephrosis secondary to obstructive calculi in the distal ureter.

## 2024-01-21 NOTE — H&P ADULT - ASSESSMENT
68 year old male with a PMH of HTN, HLD, prostate CA, EtOH abuse (last drink 1 month ago), nephrolithiasis h/o ESWL in past and stent, had recent right ureteroscopy (12/29/23)  BIBEMS for flu like symptoms.  Endorses 3 days history of congestion, cough, shaking chills, N/V/D + sick contacts ().  Also endorses R-flank pain. Denies fever, chest pain, SOB, dysuria, hematuria. Labs remarkable for WBC: 14.22, UA(+).

## 2024-01-22 LAB
A1C WITH ESTIMATED AVERAGE GLUCOSE RESULT: 5.8 % — HIGH (ref 4–5.6)
ALBUMIN SERPL ELPH-MCNC: 2.5 G/DL — LOW (ref 3.3–5)
ALP SERPL-CCNC: 95 U/L — SIGNIFICANT CHANGE UP (ref 40–120)
ALT FLD-CCNC: 104 U/L — HIGH (ref 12–78)
ANION GAP SERPL CALC-SCNC: 6 MMOL/L — SIGNIFICANT CHANGE UP (ref 5–17)
AST SERPL-CCNC: 62 U/L — HIGH (ref 15–37)
BILIRUB SERPL-MCNC: 0.8 MG/DL — SIGNIFICANT CHANGE UP (ref 0.2–1.2)
BUN SERPL-MCNC: 20 MG/DL — SIGNIFICANT CHANGE UP (ref 7–23)
CALCIUM SERPL-MCNC: 8.4 MG/DL — LOW (ref 8.5–10.1)
CHLORIDE SERPL-SCNC: 104 MMOL/L — SIGNIFICANT CHANGE UP (ref 96–108)
CHOLEST SERPL-MCNC: 165 MG/DL — SIGNIFICANT CHANGE UP
CO2 SERPL-SCNC: 27 MMOL/L — SIGNIFICANT CHANGE UP (ref 22–31)
CREAT SERPL-MCNC: 1.02 MG/DL — SIGNIFICANT CHANGE UP (ref 0.5–1.3)
EGFR: 81 ML/MIN/1.73M2 — SIGNIFICANT CHANGE UP
ESTIMATED AVERAGE GLUCOSE: 120 MG/DL — HIGH (ref 68–114)
GLUCOSE SERPL-MCNC: 125 MG/DL — HIGH (ref 70–99)
HCT VFR BLD CALC: 31.1 % — LOW (ref 39–50)
HDLC SERPL-MCNC: 50 MG/DL — SIGNIFICANT CHANGE UP
HGB BLD-MCNC: 10.4 G/DL — LOW (ref 13–17)
LIPID PNL WITH DIRECT LDL SERPL: 97 MG/DL — SIGNIFICANT CHANGE UP
MCHC RBC-ENTMCNC: 28.9 PG — SIGNIFICANT CHANGE UP (ref 27–34)
MCHC RBC-ENTMCNC: 33.4 G/DL — SIGNIFICANT CHANGE UP (ref 32–36)
MCV RBC AUTO: 86.4 FL — SIGNIFICANT CHANGE UP (ref 80–100)
NON HDL CHOLESTEROL: 114 MG/DL — SIGNIFICANT CHANGE UP
NRBC # BLD: 0 /100 WBCS — SIGNIFICANT CHANGE UP (ref 0–0)
PLATELET # BLD AUTO: 167 K/UL — SIGNIFICANT CHANGE UP (ref 150–400)
POTASSIUM SERPL-MCNC: 3.1 MMOL/L — LOW (ref 3.5–5.3)
POTASSIUM SERPL-SCNC: 3.1 MMOL/L — LOW (ref 3.5–5.3)
PROT SERPL-MCNC: 6.8 GM/DL — SIGNIFICANT CHANGE UP (ref 6–8.3)
RBC # BLD: 3.6 M/UL — LOW (ref 4.2–5.8)
RBC # FLD: 13.6 % — SIGNIFICANT CHANGE UP (ref 10.3–14.5)
SODIUM SERPL-SCNC: 137 MMOL/L — SIGNIFICANT CHANGE UP (ref 135–145)
TRIGL SERPL-MCNC: 96 MG/DL — SIGNIFICANT CHANGE UP
WBC # BLD: 10.19 K/UL — SIGNIFICANT CHANGE UP (ref 3.8–10.5)
WBC # FLD AUTO: 10.19 K/UL — SIGNIFICANT CHANGE UP (ref 3.8–10.5)

## 2024-01-22 PROCEDURE — 99223 1ST HOSP IP/OBS HIGH 75: CPT

## 2024-01-22 PROCEDURE — 99232 SBSQ HOSP IP/OBS MODERATE 35: CPT

## 2024-01-22 RX ORDER — POTASSIUM CHLORIDE 20 MEQ
40 PACKET (EA) ORAL ONCE
Refills: 0 | Status: COMPLETED | OUTPATIENT
Start: 2024-01-22 | End: 2024-01-22

## 2024-01-22 RX ORDER — INFLUENZA VIRUS VACCINE 15; 15; 15; 15 UG/.5ML; UG/.5ML; UG/.5ML; UG/.5ML
0.7 SUSPENSION INTRAMUSCULAR ONCE
Refills: 0 | Status: COMPLETED | OUTPATIENT
Start: 2024-01-22 | End: 2024-01-22

## 2024-01-22 RX ADMIN — VALSARTAN 320 MILLIGRAM(S): 80 TABLET ORAL at 06:36

## 2024-01-22 RX ADMIN — PIPERACILLIN AND TAZOBACTAM 25 GRAM(S): 4; .5 INJECTION, POWDER, LYOPHILIZED, FOR SOLUTION INTRAVENOUS at 06:32

## 2024-01-22 RX ADMIN — AMLODIPINE BESYLATE 10 MILLIGRAM(S): 2.5 TABLET ORAL at 06:36

## 2024-01-22 RX ADMIN — SODIUM CHLORIDE 120 MILLILITER(S): 9 INJECTION, SOLUTION INTRAVENOUS at 18:59

## 2024-01-22 RX ADMIN — Medication 40 MILLIEQUIVALENT(S): at 12:23

## 2024-01-22 RX ADMIN — Medication 3 MILLIGRAM(S): at 22:19

## 2024-01-22 RX ADMIN — ATORVASTATIN CALCIUM 40 MILLIGRAM(S): 80 TABLET, FILM COATED ORAL at 21:55

## 2024-01-22 RX ADMIN — Medication 25 MILLIGRAM(S): at 06:35

## 2024-01-22 RX ADMIN — TAMSULOSIN HYDROCHLORIDE 0.4 MILLIGRAM(S): 0.4 CAPSULE ORAL at 21:58

## 2024-01-22 RX ADMIN — SODIUM CHLORIDE 120 MILLILITER(S): 9 INJECTION, SOLUTION INTRAVENOUS at 21:55

## 2024-01-22 RX ADMIN — Medication 650 MILLIGRAM(S): at 04:44

## 2024-01-22 NOTE — PATIENT PROFILE ADULT - FALL HARM RISK - UNIVERSAL INTERVENTIONS
no
Bed in lowest position, wheels locked, appropriate side rails in place/Call bell, personal items and telephone in reach/Instruct patient to call for assistance before getting out of bed or chair/Non-slip footwear when patient is out of bed/[place]la to call system/Physically safe environment - no spills, clutter or unnecessary equipment/Purposeful Proactive Rounding/Room/bathroom lighting operational, light cord in reach

## 2024-01-22 NOTE — PATIENT PROFILE ADULT - SURGICAL SITE INCISION
History  Becky Fischer is a 18 y.o. female presenting for well adolescent and/or school/sports physical. She is seen today accompanied by mother. Parental concerns: she started with cough, congestion, fever past 2 days, temp 101  Brother has a cough as well  Follow up on previous concerns:   Dermatology -needs to rescheduled    Vasovagal syncope, cardiac evaluation WNL      Menarche:  Age 8  Regularity:  yes  Menstrual problems:  Cramps -takes [employer] [place] on OCP's      Social/Family History  Changes since last visit:  none  Teen lives with mother, father, brother  Relationship with parents/siblings:  normal    Risk Assessment    Education:   Grade:  12 th at [employer], face to face   Performance:  normal   Behavior/Attention:  normal   Homework:  normal  Eating:   Eats regular meals including adequate fruits and vegetables:  yes -regular meals, fruit, vegetables   Drinks non-sweetened liquids:  Yes-water   Calcium source:  yes; vitamin D, iron, elderberry   Has concerns about body or appearance:  no  Activities:   Has friends:  yes   At least 1 hour of physical activity/day:  yes   Screen time (except for homework) less than 2 hrs/day:  yes   Has interests/participates in community activities/volunteers:  no   [employer]    Drugs (Substance use/abuse):    Uses tobacco/alcohol/drugs:  no  Safety:   Home is free of violence:  yes   Uses safety belts/safety equipment:  yes   Has relationships free of violence:  yes   Impaired/Distracted driving: no         Sex:   Sexually active?:  no    Suicidality/Mental Health:   Has ways to cope with stress:  yes   Displays self-confidence:  yes   Has problems with sleep: no   Gets depressed, anxious, or irritable/has mood swings:    no   Has thought about hurting self or considered suicide:  no    3 most recent PHQ Screens 10/22/2021   Little interest or pleasure in doing things Several days   Feeling down, depressed, irritable, or hopeless Several days   Total Score PHQ 2 2   Trouble falling or staying asleep, or sleeping too much Not at all   Feeling tired or having little energy Several days   Poor appetite, weight loss, or overeating More than half the days   Feeling bad about yourself - or that you are a failure or have let yourself or your family down Several days   Trouble concentrating on things such as school, work, reading, or watching TV More than half the days   Moving or speaking so slowly that other people could have noticed; or the opposite being so fidgety that others notice Not at all   Thoughts of being better off dead, or hurting yourself in some way Not at all   PHQ 9 Score 8   In the past year have you felt depressed or sad most days, even if you felt okay? Yes   Has there been a time in the past month when you have had serious thoughts about ending your life? No   Have you ever in your whole life, tried to kill yourself or made a suicide attempt? No       Reviewed PHQ with patient  Overwhelmed with school      Review of Systems  Constitutional: negative, fever  Eyes: negative  Ears, nose, mouth, throat, and face: nasal congestion  Respiratory: cough  Cardiovascular: negative  Gastrointestinal: negative  Musculoskeletal:negative  Neurological: negative  Behavioral/Psych: negative  Allergic/Immunologic: negative    Patient Active Problem List    Diagnosis Date Noted    Vasovagal syncope 07/03/2021    Heart palpitations 07/03/2021    Acne 08/04/2016    Psoriasis of scalp 06/04/2015    Benign nevus of skin 06/04/2015    Allergic rhinitis 11/26/2014    RAD (reactive airway disease) 04/08/2011     Current Outpatient Medications   Medication Sig Dispense Refill    cholecalciferol, vitamin D3, (VITAMIN D3) 2,000 unit tab Take 1 Tab by mouth daily.        No Known Allergies  Past Medical History:   Diagnosis Date    Benign nevus of skin     has been to dermatology    Chickenpox 5/1/05    Lab test positive for detection of COVID-19 virus 08/15/2021 diagnosed at [facility] Mechanical knee pain, left 01/30/2020    [facility], MRI ordered    Reactive airway disease     Vitamin D deficiency      No past surgical history on file. Family History   Problem Relation Age of Onset    Elevated Lipids Neg Hx     Heart Disease Neg Hx     Hypertension Neg Hx      Social History     Tobacco Use    Smoking status: Never Smoker    Smokeless tobacco: Never Used   Substance Use Topics    Alcohol use: No        Lab Results   Component Value Date/Time    WBC 5.9 07/02/2021 04:12 PM    HGB 12.1 07/02/2021 04:12 PM    Hemoglobin (POC) 12.8 09/21/2017 03:58 PM    HCT 37.9 07/02/2021 04:12 PM    PLATELET 980 22/95/6415 04:12 PM    MCV 89.4 07/02/2021 04:12 PM     Lab Results   Component Value Date/Time    Cholesterol, total 147 10/22/2020 03:14 PM    HDL Cholesterol 56 10/22/2020 03:14 PM    LDL, calculated 79 10/22/2020 03:14 PM    Triglyceride 57 10/22/2020 03:14 PM     Lab Results   Component Value Date/Time    TSH 0.80 07/02/2021 04:12 PM    T4, Free 0.8 07/02/2021 04:12 PM      Lab Results   Component Value Date/Time    Sodium 142 (H) 07/02/2021 04:12 PM    Potassium 3.8 07/02/2021 04:12 PM    Chloride 108 07/02/2021 04:12 PM    CO2 27 07/02/2021 04:12 PM    Anion gap 7 07/02/2021 04:12 PM    Glucose 100 07/02/2021 04:12 PM    BUN 12 07/02/2021 04:12 PM    Creatinine 0.56 07/02/2021 04:12 PM    BUN/Creatinine ratio 21 (H) 07/02/2021 04:12 PM    GFR est AA Cannot be calculated 07/02/2021 04:12 PM    GFR est non-AA Cannot be calculated 07/02/2021 04:12 PM    Calcium 9.2 07/02/2021 04:12 PM    Bilirubin, total 0.4 07/02/2021 04:12 PM    ALT (SGPT) 20 07/02/2021 04:12 PM    Alk.  phosphatase 84 07/02/2021 04:12 PM    Protein, total 7.2 07/02/2021 04:12 PM    Albumin 4.0 07/02/2021 04:12 PM    Globulin 3.2 07/02/2021 04:12 PM    A-G Ratio 1.3 07/02/2021 04:12 PM         Objective:    Visit Vitals  /60 (BP 1 Location: Left upper arm, BP Patient Position: Sitting)   Pulse 101   Temp 98.4 °F (36.9 °C) (Oral)   Ht 5' 2.13\" (1.578 m)   Wt 149 lb 9.6 oz (67.9 kg)   SpO2 98%   BMI 27.25 kg/m²         General appearance  alert, cooperative, no distress, appears stated age   Head  Normocephalic, without obvious abnormality, atraumatic   Eyes  conjunctivae/corneas clear. PERRL, EOM's intact. Fundi benign   Ears  normal TM's and external ear canals AU   Nose Nares normal. Septum midline. Mucosa boggy, congestion. No drainage or sinus tenderness. Throat Lips, mucosa, and tongue normal. Teeth and gums normal   Neck supple, symmetrical, trachea midline, no adenopathy, thyroid: not enlarged, symmetric, no tenderness/mass/nodules, no carotid bruit and no JVD   Back   symmetric, no curvature. ROM normal. No CVA tenderness   Lungs   clear to auscultation bilaterally    Breasts  no masses, tenderness   Heart  regular rate and rhythm, S1, S2 normal, no murmur, click, rub or gallop   Abdomen   soft, non-tender. Bowel sounds normal. No masses,  No organomegaly   Pelvic Deferred; :Frank 5-chaperone present in exam room-mother   Extremities extremities normal, atraumatic, no cyanosis or edema   Pulses 2+ and symmetric   Skin Skin color, texture, turgor normal. No rashes or lesions   Lymph nodes Cervical, supraclavicular, and axillary nodes normal.   Neurologic Normal exam, normal DTR's         Assessment:    Healthy 18 y.o. old female with no physical activity limitations. Bronchitis with bronchospasm    Plan:  Anticipatory Guidance: Gave a handout on well teen issues at this age , importance of varied diet, minimize junk food, importance of regular dental care, seat belts/ sports protective gear/ helmet safety/ swimming safety, healthy sexual awareness/ relationships, reviewed tobacco, alcohol and drug dangers    Immunizations deferred due to acute illness      The patient and mother were counseled regarding nutrition and physical activity.     Reviewed growth chart  Encourage exercise  Discussed making good food choices and portion control        Bronchitis with bronchospasm  Albuterol inhaler  Zithromax      Supportive and comfort care include encouraging and increasing fluids, rest and fever reducers if needed. Please call us if fever/symptoms persist for more than another 48 hours or if new symptoms develop or if you feel your child is not improving as expected. Results for orders placed or performed in visit on 10/22/21   AMB POC RAPID STREP A   Result Value Ref Range    VALID INTERNAL CONTROL POC Yes     Group A Strep Ag Negative Negative   AMB POC LILI INFLUENZA A/B TEST   Result Value Ref Range    VALID INTERNAL CONTROL POC Yes     Influenza A Ag POC Negative Negative    Influenza B Ag POC Negative Negative       PHQ reviewed, score 8-mild  Follow-up recommended for 2 months      ICD-10-CM ICD-9-CM    1. Encounter for routine child health examination without abnormal findings  Z00.129 V20.2    2. Acute bronchitis with bronchospasm  J20.9 466.0 albuterol (PROVENTIL HFA, VENTOLIN HFA, PROAIR HFA) 90 mcg/actuation inhaler      azithromycin (ZITHROMAX) 250 mg tablet      CULTURE, THROAT      AMB POC RAPID STREP A      AMB POC LILI INFLUENZA A/B TEST      NOVEL CORONAVIRUS (COVID-19)      TX HANDLG&/OR CONVEY OF SPEC FOR TR OFFICE TO LAB      CULTURE, THROAT   3. Immunization not carried out because of acute illness  Z28.01 V64.01    4. Screening for depression  Z13.31 V79.0        Follow-up and Dispositions    · Return if symptoms worsen or fail to improve. no

## 2024-01-22 NOTE — PROGRESS NOTE ADULT - ASSESSMENT
68M PMH prostate ca, HTN HLD nephrolithiasis with recent ureteroscopy, lithotripsy, removal of stones and insertion of stent.  Stent removed subsequently in office.  Febrile, +Leukocytosis    F/U blood cx, urine cx  strain urine  start flomax 0.4mg qs  start zosyn q8h  NPO for OR today for ureteroscopy, stent. Patient is stating he does not want any procedures until he speaks with Dr Malone

## 2024-01-22 NOTE — PROGRESS NOTE ADULT - ASSESSMENT
68 year old male with a PMH of HTN, HLD, prostate CA, EtOH abuse (last drink 1 month ago), nephrolithiasis h/o ESWL in past and stent, had recent right ureteroscopy (12/29/23)  BIBEMS for flu like symptoms.  Endorses 3 days history of congestion, cough, shaking chills, N/V/D + sick contacts ().  Also endorses R-flank pain. Denies fever, chest pain, SOB, dysuria, hematuria. Labs remarkable for WBC: 14.22, UA(+).            Problem/Plan - 1:  ·  Problem: Nephrolithiasis.   ·  Plan: History of Nephrolithiasis  Recent right ureteroscopy (12/29/23)  CT-Abd; Moderate right hydroureteronephrosis secondary to obstructive calculi in the distal ureter.  Urology consulted   Discussed with Dr. Malone - no surgical plans.  Recommends observation, screening of urine.    - IVF  - Zosyn   - Pain control  - Tamsulosin  - DVT prophylaxis.     Problem/Plan - 2:  ·  Problem: Upper respiratory infection.   ·  Plan: 3 days history of URI symptoms   RVP (-)   - Supportive care   - Mucinex for cough.     Problem/Plan - 3:  ·  Problem: Hypertension.   ·  Plan: Continue home meds   - Amlodipine 10mg   - Metoprolol Succ 25mg   - 12.5mg   - Valsartan 320mg.

## 2024-01-23 LAB
ANION GAP SERPL CALC-SCNC: 7 MMOL/L — SIGNIFICANT CHANGE UP (ref 5–17)
BUN SERPL-MCNC: 15 MG/DL — SIGNIFICANT CHANGE UP (ref 7–23)
CALCIUM SERPL-MCNC: 8.4 MG/DL — LOW (ref 8.5–10.1)
CHLORIDE SERPL-SCNC: 101 MMOL/L — SIGNIFICANT CHANGE UP (ref 96–108)
CO2 SERPL-SCNC: 29 MMOL/L — SIGNIFICANT CHANGE UP (ref 22–31)
CREAT SERPL-MCNC: 0.87 MG/DL — SIGNIFICANT CHANGE UP (ref 0.5–1.3)
CULTURE RESULTS: SIGNIFICANT CHANGE UP
EGFR: 95 ML/MIN/1.73M2 — SIGNIFICANT CHANGE UP
GLUCOSE SERPL-MCNC: 113 MG/DL — HIGH (ref 70–99)
HCT VFR BLD CALC: 29.9 % — LOW (ref 39–50)
HGB BLD-MCNC: 10.1 G/DL — LOW (ref 13–17)
MCHC RBC-ENTMCNC: 28.8 PG — SIGNIFICANT CHANGE UP (ref 27–34)
MCHC RBC-ENTMCNC: 33.8 G/DL — SIGNIFICANT CHANGE UP (ref 32–36)
MCV RBC AUTO: 85.2 FL — SIGNIFICANT CHANGE UP (ref 80–100)
NRBC # BLD: 0 /100 WBCS — SIGNIFICANT CHANGE UP (ref 0–0)
PLATELET # BLD AUTO: 177 K/UL — SIGNIFICANT CHANGE UP (ref 150–400)
POTASSIUM SERPL-MCNC: 3.4 MMOL/L — LOW (ref 3.5–5.3)
POTASSIUM SERPL-SCNC: 3.4 MMOL/L — LOW (ref 3.5–5.3)
RBC # BLD: 3.51 M/UL — LOW (ref 4.2–5.8)
RBC # FLD: 13.4 % — SIGNIFICANT CHANGE UP (ref 10.3–14.5)
SODIUM SERPL-SCNC: 137 MMOL/L — SIGNIFICANT CHANGE UP (ref 135–145)
SPECIMEN SOURCE: SIGNIFICANT CHANGE UP
WBC # BLD: 4.74 K/UL — SIGNIFICANT CHANGE UP (ref 3.8–10.5)
WBC # FLD AUTO: 4.74 K/UL — SIGNIFICANT CHANGE UP (ref 3.8–10.5)

## 2024-01-23 PROCEDURE — 99223 1ST HOSP IP/OBS HIGH 75: CPT

## 2024-01-23 PROCEDURE — 99232 SBSQ HOSP IP/OBS MODERATE 35: CPT

## 2024-01-23 RX ORDER — OXYCODONE HYDROCHLORIDE 5 MG/1
10 TABLET ORAL EVERY 4 HOURS
Refills: 0 | Status: DISCONTINUED | OUTPATIENT
Start: 2024-01-23 | End: 2024-01-24

## 2024-01-23 RX ORDER — PIPERACILLIN AND TAZOBACTAM 4; .5 G/20ML; G/20ML
3.38 INJECTION, POWDER, LYOPHILIZED, FOR SOLUTION INTRAVENOUS ONCE
Refills: 0 | Status: COMPLETED | OUTPATIENT
Start: 2024-01-23 | End: 2024-01-23

## 2024-01-23 RX ORDER — HEPARIN SODIUM 5000 [USP'U]/ML
5000 INJECTION INTRAVENOUS; SUBCUTANEOUS EVERY 12 HOURS
Refills: 0 | Status: DISCONTINUED | OUTPATIENT
Start: 2024-01-23 | End: 2024-01-24

## 2024-01-23 RX ORDER — POTASSIUM CHLORIDE 20 MEQ
40 PACKET (EA) ORAL ONCE
Refills: 0 | Status: COMPLETED | OUTPATIENT
Start: 2024-01-23 | End: 2024-01-23

## 2024-01-23 RX ORDER — PIPERACILLIN AND TAZOBACTAM 4; .5 G/20ML; G/20ML
3.38 INJECTION, POWDER, LYOPHILIZED, FOR SOLUTION INTRAVENOUS EVERY 8 HOURS
Refills: 0 | Status: DISCONTINUED | OUTPATIENT
Start: 2024-01-23 | End: 2024-01-24

## 2024-01-23 RX ORDER — OXYCODONE HYDROCHLORIDE 5 MG/1
5 TABLET ORAL EVERY 4 HOURS
Refills: 0 | Status: DISCONTINUED | OUTPATIENT
Start: 2024-01-23 | End: 2024-01-24

## 2024-01-23 RX ADMIN — PIPERACILLIN AND TAZOBACTAM 25 GRAM(S): 4; .5 INJECTION, POWDER, LYOPHILIZED, FOR SOLUTION INTRAVENOUS at 14:25

## 2024-01-23 RX ADMIN — ATORVASTATIN CALCIUM 40 MILLIGRAM(S): 80 TABLET, FILM COATED ORAL at 21:53

## 2024-01-23 RX ADMIN — OXYCODONE HYDROCHLORIDE 5 MILLIGRAM(S): 5 TABLET ORAL at 23:00

## 2024-01-23 RX ADMIN — AMLODIPINE BESYLATE 10 MILLIGRAM(S): 2.5 TABLET ORAL at 11:16

## 2024-01-23 RX ADMIN — Medication 25 MILLIGRAM(S): at 06:39

## 2024-01-23 RX ADMIN — Medication 3 MILLIGRAM(S): at 21:58

## 2024-01-23 RX ADMIN — OXYCODONE HYDROCHLORIDE 5 MILLIGRAM(S): 5 TABLET ORAL at 21:58

## 2024-01-23 RX ADMIN — PIPERACILLIN AND TAZOBACTAM 25 GRAM(S): 4; .5 INJECTION, POWDER, LYOPHILIZED, FOR SOLUTION INTRAVENOUS at 21:47

## 2024-01-23 RX ADMIN — Medication 40 MILLIEQUIVALENT(S): at 11:13

## 2024-01-23 RX ADMIN — VALSARTAN 320 MILLIGRAM(S): 80 TABLET ORAL at 11:15

## 2024-01-23 RX ADMIN — PIPERACILLIN AND TAZOBACTAM 200 GRAM(S): 4; .5 INJECTION, POWDER, LYOPHILIZED, FOR SOLUTION INTRAVENOUS at 11:13

## 2024-01-23 RX ADMIN — SODIUM CHLORIDE 120 MILLILITER(S): 9 INJECTION, SOLUTION INTRAVENOUS at 06:39

## 2024-01-23 RX ADMIN — Medication 650 MILLIGRAM(S): at 11:13

## 2024-01-23 RX ADMIN — TAMSULOSIN HYDROCHLORIDE 0.4 MILLIGRAM(S): 0.4 CAPSULE ORAL at 21:52

## 2024-01-23 RX ADMIN — SODIUM CHLORIDE 120 MILLILITER(S): 9 INJECTION, SOLUTION INTRAVENOUS at 14:57

## 2024-01-23 NOTE — PROGRESS NOTE ADULT - ASSESSMENT
69 Y/O male PMH prostate ca, HTN HLD nephrolithiasis with recent ureteroscopy, lithotripsy, removal of stones and insertion of stent.  Stent removed subsequently in office.  Now afebrile with resolved leukocytosis. Patient was previously scheduled to go to OR, but was canceled due to patient not wishing to move forward with procedure. After further review of CT scan, Dr. Malone recommended continued straining urine with anticipation for spontaneous passage. Patient now complaining of continue chills and suprapubic pain.       PLAN:      **Full plan to follow  67 Y/O male PMH prostate ca, HTN HLD nephrolithiasis with recent ureteroscopy, lithotripsy, removal of stones and insertion of stent.  Stent removed subsequently in office.  Now afebrile with resolved leukocytosis. Patient was previously scheduled to go to OR, but was canceled due to patient not wishing to move forward with procedure. After further review of CT scan, Dr. Malone recommended continued straining urine with anticipation for spontaneous passage. Patient now complaining of continue chills and suprapubic pain.       PLAN:      - Continue Flomax   - Continue straining urine; monitor I&Os  - F/U AM labs   - Continue to monitor VS  - Possible transition to oral ABx tomorrow   - Continue care per primary team   - Discussed with Dr. Phillips  67 Y/O male PMH prostate ca, HTN HLD nephrolithiasis with recent ureteroscopy, lithotripsy, removal of stones and insertion of stent.  Stent removed subsequently in office.  Now afebrile with resolved leukocytosis. Patient was previously scheduled to go to OR, but was canceled due to patient not wishing to move forward with procedure. After further review of CT scan, Dr. Malone recommended continued straining urine with anticipation for spontaneous passage. Patient now complaining of continue chills and suprapubic pain. UCx from 1/21 with prelimary enterococcus faecalis. Currently on Zosyn.       PLAN:      - Continue Flomax   - Continue ABx; Possible transition to oral ABx tomorrow   - Continue straining urine; monitor I&Os  - F/U AM labs   - Continue to monitor VS  - Continue care per primary team   - Discussed with Dr. Phillips

## 2024-01-23 NOTE — PROGRESS NOTE ADULT - ASSESSMENT
68 year old male with a PMH of HTN, HLD, prostate CA, EtOH abuse (last drink 1 month ago), nephrolithiasis h/o ESWL in past and stent, had recent right ureteroscopy (12/29/23)  BIBEMS for flu like symptoms.  Endorses 3 days history of congestion, cough, shaking chills, N/V/D + sick contacts ().  Also endorses R-flank pain. Denies fever, chest pain, SOB, dysuria, hematuria. Labs remarkable for WBC: 14.22, UA(+).            Problem/Plan - 1:  ·  Problem: Nephrolithiasis / UTI  ·  Plan: History of Nephrolithiasis  Recent right ureteroscopy (12/29/23)  CT-Abd; Moderate right hydroureteronephrosis secondary to obstructive calculi in the distal ureter.  Urology consulted   For now,  has no surgical plans.  Recommends observation, screening of urine.    - IVF  - Zosyn   - Pain control  - Tamsulosin  - UCx growing E faecalis     Problem/Plan - 2:  ·  Problem: Upper respiratory infection.   ·  Plan: 3 days history of URI symptoms   RVP (-)   - Supportive care   - Mucinex for cough.     Problem/Plan - 3:  ·  Problem: Hypertension.   ·  Plan: Continue home meds   - Amlodipine 10mg   - Metoprolol Succ 25mg   - 12.5mg   - Valsartan 320mg.     68 year old male with a PMH of HTN, HLD, prostate CA, EtOH abuse (last drink 1 month ago), nephrolithiasis h/o ESWL in past and stent, had recent right ureteroscopy (12/29/23)  BIBEMS for flu like symptoms.  Endorses 3 days history of congestion, cough, shaking chills, N/V/D + sick contacts ().  Also endorses R-flank pain. Denies fever, chest pain, SOB, dysuria, hematuria. Labs remarkable for WBC: 14.22, UA(+).            Problem/Plan - 1:  ·  Problem: Nephrolithiasis / UTI  ·  Plan: History of Nephrolithiasis  Recent right ureteroscopy (12/29/23)  CT-Abd; Moderate right hydroureteronephrosis secondary to obstructive calculi in the distal ureter.  Urology consulted   For now,  has no surgical plans.  Recommends observation, screening of urine.    - IVF  - Zosyn   - Pain control - confirmed pt able to tolerate Oxycodone, has h/o vomiting with Morphine, no anaphylaxis.    - Tamsulosin  - UCx growing E faecalis     Problem/Plan - 2:  ·  Problem: Upper respiratory infection.   ·  Plan: 3 days history of URI symptoms   RVP (-)   - Supportive care   - Mucinex for cough.     Problem/Plan - 3:  ·  Problem: Hypertension.   ·  Plan: Continue home meds   - Amlodipine 10mg   - Metoprolol Succ 25mg   - 12.5mg   - Valsartan 320mg.

## 2024-01-23 NOTE — PROGRESS NOTE ADULT - NS ATTEND AMEND GEN_ALL_CORE FT
Monitor for sx's and temps.  f/u cx's   blood neg for now  f/u final urine cx  Cr now normal: f/u repeat labs

## 2024-01-24 ENCOUNTER — TRANSCRIPTION ENCOUNTER (OUTPATIENT)
Age: 69
End: 2024-01-24

## 2024-01-24 VITALS
SYSTOLIC BLOOD PRESSURE: 110 MMHG | OXYGEN SATURATION: 97 % | HEART RATE: 66 BPM | TEMPERATURE: 98 F | DIASTOLIC BLOOD PRESSURE: 56 MMHG | RESPIRATION RATE: 18 BRPM

## 2024-01-24 LAB
-  AMPICILLIN: SIGNIFICANT CHANGE UP
-  CIPROFLOXACIN: SIGNIFICANT CHANGE UP
-  LEVOFLOXACIN: SIGNIFICANT CHANGE UP
-  NITROFURANTOIN: SIGNIFICANT CHANGE UP
-  TETRACYCLINE: SIGNIFICANT CHANGE UP
-  VANCOMYCIN: SIGNIFICANT CHANGE UP
ANION GAP SERPL CALC-SCNC: 8 MMOL/L — SIGNIFICANT CHANGE UP (ref 5–17)
BUN SERPL-MCNC: 10 MG/DL — SIGNIFICANT CHANGE UP (ref 7–23)
CALCIUM SERPL-MCNC: 8.7 MG/DL — SIGNIFICANT CHANGE UP (ref 8.5–10.1)
CHLORIDE SERPL-SCNC: 103 MMOL/L — SIGNIFICANT CHANGE UP (ref 96–108)
CO2 SERPL-SCNC: 27 MMOL/L — SIGNIFICANT CHANGE UP (ref 22–31)
CREAT SERPL-MCNC: 0.86 MG/DL — SIGNIFICANT CHANGE UP (ref 0.5–1.3)
CULTURE RESULTS: ABNORMAL
EGFR: 96 ML/MIN/1.73M2 — SIGNIFICANT CHANGE UP
GLUCOSE SERPL-MCNC: 114 MG/DL — HIGH (ref 70–99)
HCT VFR BLD CALC: 31 % — LOW (ref 39–50)
HGB BLD-MCNC: 10.6 G/DL — LOW (ref 13–17)
MCHC RBC-ENTMCNC: 28.7 PG — SIGNIFICANT CHANGE UP (ref 27–34)
MCHC RBC-ENTMCNC: 34.2 G/DL — SIGNIFICANT CHANGE UP (ref 32–36)
MCV RBC AUTO: 84 FL — SIGNIFICANT CHANGE UP (ref 80–100)
METHOD TYPE: SIGNIFICANT CHANGE UP
NRBC # BLD: 0 /100 WBCS — SIGNIFICANT CHANGE UP (ref 0–0)
ORGANISM # SPEC MICROSCOPIC CNT: ABNORMAL
ORGANISM # SPEC MICROSCOPIC CNT: SIGNIFICANT CHANGE UP
PLATELET # BLD AUTO: 193 K/UL — SIGNIFICANT CHANGE UP (ref 150–400)
POTASSIUM SERPL-MCNC: 3.2 MMOL/L — LOW (ref 3.5–5.3)
POTASSIUM SERPL-SCNC: 3.2 MMOL/L — LOW (ref 3.5–5.3)
RBC # BLD: 3.69 M/UL — LOW (ref 4.2–5.8)
RBC # FLD: 13.2 % — SIGNIFICANT CHANGE UP (ref 10.3–14.5)
SODIUM SERPL-SCNC: 138 MMOL/L — SIGNIFICANT CHANGE UP (ref 135–145)
SPECIMEN SOURCE: SIGNIFICANT CHANGE UP
WBC # BLD: 4.64 K/UL — SIGNIFICANT CHANGE UP (ref 3.8–10.5)
WBC # FLD AUTO: 4.64 K/UL — SIGNIFICANT CHANGE UP (ref 3.8–10.5)

## 2024-01-24 PROCEDURE — 99239 HOSP IP/OBS DSCHRG MGMT >30: CPT

## 2024-01-24 RX ORDER — OXYCODONE AND ACETAMINOPHEN 5; 325 MG/1; MG/1
1 TABLET ORAL
Qty: 6 | Refills: 0
Start: 2024-01-24 | End: 2024-01-26

## 2024-01-24 RX ORDER — HYDROCHLOROTHIAZIDE 25 MG
1 TABLET ORAL
Qty: 0 | Refills: 0 | DISCHARGE
Start: 2024-01-24

## 2024-01-24 RX ORDER — ATORVASTATIN CALCIUM 80 MG/1
1 TABLET, FILM COATED ORAL
Qty: 0 | Refills: 0 | DISCHARGE
Start: 2024-01-24

## 2024-01-24 RX ORDER — TAMSULOSIN HYDROCHLORIDE 0.4 MG/1
1 CAPSULE ORAL
Qty: 0 | Refills: 0 | DISCHARGE
Start: 2024-01-24

## 2024-01-24 RX ORDER — HYDROCHLOROTHIAZIDE 25 MG
1 TABLET ORAL
Refills: 0 | DISCHARGE

## 2024-01-24 RX ORDER — PEG-3350, SODIUM SULFATE, SODIUM CHLORIDE, POTASSIUM CHLORIDE, SODIUM ASCORBATE AND ASCORBIC ACID 7.5-2.691G
100 KIT ORAL
Qty: 1 | Refills: 0 | Status: ACTIVE | COMMUNITY
Start: 2024-01-24 | End: 1900-01-01

## 2024-01-24 RX ORDER — VALSARTAN 80 MG/1
1 TABLET ORAL
Qty: 0 | Refills: 0 | DISCHARGE
Start: 2024-01-24

## 2024-01-24 RX ORDER — VALSARTAN 80 MG/1
1 TABLET ORAL
Refills: 0 | DISCHARGE

## 2024-01-24 RX ORDER — ATORVASTATIN CALCIUM 80 MG/1
1 TABLET, FILM COATED ORAL
Refills: 0 | DISCHARGE

## 2024-01-24 RX ORDER — METOPROLOL TARTRATE 50 MG
1 TABLET ORAL
Qty: 0 | Refills: 0 | DISCHARGE
Start: 2024-01-24

## 2024-01-24 RX ORDER — AMLODIPINE BESYLATE 2.5 MG/1
1 TABLET ORAL
Refills: 0 | DISCHARGE

## 2024-01-24 RX ORDER — AMLODIPINE BESYLATE 2.5 MG/1
1 TABLET ORAL
Qty: 0 | Refills: 0 | DISCHARGE
Start: 2024-01-24

## 2024-01-24 RX ADMIN — SODIUM CHLORIDE 120 MILLILITER(S): 9 INJECTION, SOLUTION INTRAVENOUS at 12:33

## 2024-01-24 RX ADMIN — SODIUM CHLORIDE 120 MILLILITER(S): 9 INJECTION, SOLUTION INTRAVENOUS at 02:47

## 2024-01-24 RX ADMIN — Medication 25 MILLIGRAM(S): at 05:59

## 2024-01-24 RX ADMIN — AMLODIPINE BESYLATE 10 MILLIGRAM(S): 2.5 TABLET ORAL at 06:00

## 2024-01-24 RX ADMIN — PIPERACILLIN AND TAZOBACTAM 25 GRAM(S): 4; .5 INJECTION, POWDER, LYOPHILIZED, FOR SOLUTION INTRAVENOUS at 05:59

## 2024-01-24 RX ADMIN — VALSARTAN 320 MILLIGRAM(S): 80 TABLET ORAL at 05:59

## 2024-01-24 RX ADMIN — HEPARIN SODIUM 5000 UNIT(S): 5000 INJECTION INTRAVENOUS; SUBCUTANEOUS at 06:01

## 2024-01-24 RX ADMIN — OXYCODONE HYDROCHLORIDE 5 MILLIGRAM(S): 5 TABLET ORAL at 11:37

## 2024-01-24 RX ADMIN — PIPERACILLIN AND TAZOBACTAM 25 GRAM(S): 4; .5 INJECTION, POWDER, LYOPHILIZED, FOR SOLUTION INTRAVENOUS at 13:54

## 2024-01-24 NOTE — PROGRESS NOTE ADULT - REASON FOR ADMISSION
UTI with Renal stones

## 2024-01-24 NOTE — DISCHARGE NOTE NURSING/CASE MANAGEMENT/SOCIAL WORK - NSDCPEFALRISK_GEN_ALL_CORE
For information on Fall & Injury Prevention, visit: [email or web address] OR  [email or web address] OR  https://www.cdc.gov/steadi/patient.html

## 2024-01-24 NOTE — DISCHARGE NOTE PROVIDER - PROVIDER TOKENS
FREE:[LAST:[PMD],PHONE:[(   )    -],FAX:[(   )    -],FOLLOWUP:[2 weeks]],PROVIDER:[TOKEN:[[id number]],FOLLOWUP:[1 week]]

## 2024-01-24 NOTE — PROGRESS NOTE ADULT - SUBJECTIVE AND OBJECTIVE BOX
Patient: LAURA SALDAÑA [id number] 68y Male                            Hospitalist Attending Note    Seen at approximately 2pm   c/o R flank pain  No fever / chills.    ____________________PHYSICAL EXAM:  GENERAL:  NAD Alert and Oriented x 3   HEENT: NCAT  CARDIOVASCULAR:  S1, S2  LUNGS: CTAB  ABDOMEN:  soft, (-) tenderness, (-) distension, (+) bowel sounds, (-) guarding, (-) rebound (-) rigidity  EXTREMITIES:  no cyanosis / clubbing / edema.   ____________________      VITALS:  Vital Signs Last 24 Hrs  T(C): 37.1 (23 Jan 2024 11:17), Max: 37.7 (23 Jan 2024 00:22)  T(F): 98.8 (23 Jan 2024 11:17), Max: 99.8 (23 Jan 2024 00:22)  HR: 74 (23 Jan 2024 11:17) (70 - 76)  BP: 123/75 (23 Jan 2024 11:17) (113/72 - 145/82)  BP(mean): --  RR: 20 (23 Jan 2024 11:17) (18 - 20)  SpO2: 98% (23 Jan 2024 11:17) (96% - 98%)    Parameters below as of 22 Jan 2024 17:34  Patient On (Oxygen Delivery Method): room air     Daily     Daily   CAPILLARY BLOOD GLUCOSE        I&O's Summary    22 Jan 2024 07:01  -  23 Jan 2024 07:00  --------------------------------------------------------  IN: 1440 mL / OUT: 0 mL / NET: 1440 mL        LABS:                        10.1   4.74  )-----------( 177      ( 23 Jan 2024 07:40 )             29.9     01-23    137  |  101  |  15  ----------------------------<  113<H>  3.4<L>   |  29  |  0.87    Ca    8.4<L>      23 Jan 2024 07:40    TPro  6.8  /  Alb  2.5<L>  /  TBili  0.8  /  DBili  x   /  AST  62<H>  /  ALT  104<H>  /  AlkPhos  95  01-22      LIVER FUNCTIONS - ( 22 Jan 2024 06:34 )  Alb: 2.5 g/dL / Pro: 6.8 gm/dL / ALK PHOS: 95 U/L / ALT: 104 U/L / AST: 62 U/L / GGT: x           Urinalysis Basic - ( 23 Jan 2024 07:40 )    Color: x / Appearance: x / SG: x / pH: x  Gluc: 113 mg/dL / Ketone: x  / Bili: x / Urobili: x   Blood: x / Protein: x / Nitrite: x   Leuk Esterase: x / RBC: x / WBC x   Sq Epi: x / Non Sq Epi: x / Bacteria: x            Culture - Urine (collected 22 Jan 2024 11:08)  Source: Clean Catch Clean Catch (Midstream)  Final Report (23 Jan 2024 11:48):    <10,000 CFU/mL Normal Urogenital Mila    Culture - Blood (collected 21 Jan 2024 18:49)  Source: .Blood Blood  Preliminary Report (23 Jan 2024 03:01):    No growth at 24 hours    Culture - Urine (collected 21 Jan 2024 14:54)  Source: Clean Catch Clean Catch (Midstream)  Preliminary Report (23 Jan 2024 14:27):    >100,000 CFU/ml Enterococcus faecalis        MEDICATIONS:  acetaminophen     Tablet .. 650 milliGRAM(s) Oral every 6 hours PRN  aluminum hydroxide/magnesium hydroxide/simethicone Suspension 30 milliLiter(s) Oral every 4 hours PRN  amLODIPine   Tablet 10 milliGRAM(s) Oral daily  atorvastatin 40 milliGRAM(s) Oral at bedtime  hydrochlorothiazide 12.5 milliGRAM(s) Oral daily  influenza  Vaccine (HIGH DOSE) 0.7 milliLiter(s) IntraMuscular once  lactated ringers. 1000 milliLiter(s) IV Continuous <Continuous>  melatonin 3 milliGRAM(s) Oral at bedtime PRN  metoprolol succinate ER 25 milliGRAM(s) Oral daily  ondansetron Injectable 4 milliGRAM(s) IV Push every 8 hours PRN  oxyCODONE    IR 5 milliGRAM(s) Oral every 4 hours PRN  oxyCODONE    IR 10 milliGRAM(s) Oral every 4 hours PRN  piperacillin/tazobactam IVPB.. 3.375 Gram(s) IV Intermittent every 8 hours  tamsulosin 0.4 milliGRAM(s) Oral at bedtime  valsartan 320 milliGRAM(s) Oral daily    
             c/o of RLQ pain      ____________________PHYSICAL EXAM:  GENERAL:  NAD Alert and Oriented x 3   HEENT: NCAT  CARDIOVASCULAR:  S1, S2  LUNGS: CTAB  ABDOMEN:  soft, (-) tenderness, (-) distension, (+) bowel sounds, (-) guarding, (-) rebound (-) rigidity  EXTREMITIES:  no cyanosis / clubbing / edema.   ____________________      Vital Signs Last 24 Hrs  T(C): 36.5 (24 Jan 2024 11:49), Max: 36.9 (24 Jan 2024 00:44)  T(F): 97.7 (24 Jan 2024 11:49), Max: 98.4 (24 Jan 2024 00:44)  HR: 70 (24 Jan 2024 11:49) (65 - 74)  BP: 105/66 (24 Jan 2024 11:49) (105/66 - 126/73)  BP(mean): --  RR: 18 (24 Jan 2024 11:49) (18 - 18)  SpO2: 98% (24 Jan 2024 11:49) (98% - 98%)    Parameters below as of 24 Jan 2024 11:49  Patient On (Oxygen Delivery Method): room air         Daily     Daily   CAPILLARY BLOOD GLUCOSE        I&O's Summary    22 Jan 2024 07:01  -  23 Jan 2024 07:00  --------------------------------------------------------  IN: 1440 mL / OUT: 0 mL / NET: 1440 mL        LABS:                                   10.6   4.64  )-----------( 193      ( 24 Jan 2024 07:45 )             31.0     01-24    138  |  103  |  10  ----------------------------<  114<H>  3.2<L>   |  27  |  0.86    Ca    8.7      24 Jan 2024 07:45        Urinalysis Basic - ( 24 Jan 2024 07:45 )    Color: x / Appearance: x / SG: x / pH: x  Gluc: 114 mg/dL / Ketone: x  / Bili: x / Urobili: x   Blood: x / Protein: x / Nitrite: x   Leuk Esterase: x / RBC: x / WBC x   Sq Epi: x / Non Sq Epi: x / Bacteria: x                  Culture - Urine (collected 22 Jan 2024 11:08)  Source: Clean Catch Clean Catch (Midstream)  Final Report (23 Jan 2024 11:48):    <10,000 CFU/mL Normal Urogenital Mila    Culture - Blood (collected 21 Jan 2024 18:49)  Source: .Blood Blood  Preliminary Report (23 Jan 2024 03:01):    No growth at 24 hours    Culture - Urine (collected 21 Jan 2024 14:54)  Source: Clean Catch Clean Catch (Midstream)  Preliminary Report (23 Jan 2024 14:27):    >100,000 CFU/ml Enterococcus faecalis        MEDICATIONS  (STANDING):  amLODIPine   Tablet 10 milliGRAM(s) Oral daily  atorvastatin 40 milliGRAM(s) Oral at bedtime  heparin   Injectable 5000 Unit(s) SubCutaneous every 12 hours  hydrochlorothiazide 12.5 milliGRAM(s) Oral daily  influenza  Vaccine (HIGH DOSE) 0.7 milliLiter(s) IntraMuscular once  lactated ringers. 1000 milliLiter(s) (120 mL/Hr) IV Continuous <Continuous>  metoprolol succinate ER 25 milliGRAM(s) Oral daily  piperacillin/tazobactam IVPB.. 3.375 Gram(s) IV Intermittent every 8 hours  tamsulosin 0.4 milliGRAM(s) Oral at bedtime  valsartan 320 milliGRAM(s) Oral daily    MEDICATIONS  (PRN):  acetaminophen     Tablet .. 650 milliGRAM(s) Oral every 6 hours PRN Temp greater or equal to 38C (100.4F), Mild Pain (1 - 3)  aluminum hydroxide/magnesium hydroxide/simethicone Suspension 30 milliLiter(s) Oral every 4 hours PRN Dyspepsia  melatonin 3 milliGRAM(s) Oral at bedtime PRN Insomnia  ondansetron Injectable 4 milliGRAM(s) IV Push every 8 hours PRN Nausea and/or Vomiting  oxyCODONE    IR 10 milliGRAM(s) Oral every 4 hours PRN Severe Pain (7 - 10)  oxyCODONE    IR 5 milliGRAM(s) Oral every 4 hours PRN Moderate Pain (4 - 6)  
Patient seen and examined bedside resting comfortably.  Stated he does not want to go to OR  Voiding without difficulty.  Denies hematuria and dysuria.  Denies nausea and vomiting. NPO  Denies chest pain, dyspnea, cough.    T(F): 98.8 (01-22-24 @ 06:39), Max: 100.8 (01-22-24 @ 04:56)  HR: 79 (01-22-24 @ 04:56) (76 - 85)  BP: 141/73 (01-22-24 @ 04:56) (134/80 - 141/73)  RR: 20 (01-22-24 @ 04:56) (19 - 24)  SpO2: 95% (01-22-24 @ 04:56) (95% - 99%)  Wt(kg): --  CAPILLARY BLOOD GLUCOSE      POCT Blood Glucose.: 126 mg/dL (21 Jan 2024 13:25)      PHYSICAL EXAM:  General: NAD, alert and awake  HEENT: NCAT, EOMI, conjunctiva clear  Chest: nonlabored respirations, good inspiratory effort  Abdomen: soft, NTND.   Extremities: no pedal edema or calf tenderness noted   : No CVA or SP tenderness.     LABS:                        11.5   14.22 )-----------( 187      ( 21 Jan 2024 13:29 )             34.7   01-21    139  |  104  |  20  ----------------------------<  148<H>  3.8   |  28  |  1.11    Ca    8.9      21 Jan 2024 13:29    TPro  7.5  /  Alb  2.9<L>  /  TBili  0.7  /  DBili  x   /  AST  108<H>  /  ALT  139<H>  /  AlkPhos  114  01-21    I&O's Detail  
SURGERY PROGRESS HPI:  Pt seen and examined at bedside. Pt says he is still having some right flank pain.  He is straining urine, has not passed stone yet.        Vital Signs Last 24 Hrs  T(C): 36.7 (24 Jan 2024 05:24), Max: 37.1 (23 Jan 2024 11:17)  T(F): 98 (24 Jan 2024 05:24), Max: 98.8 (23 Jan 2024 11:17)  HR: 65 (24 Jan 2024 05:24) (65 - 74)  BP: 122/73 (24 Jan 2024 05:24) (120/66 - 126/73)  BP(mean): --  RR: 18 (24 Jan 2024 05:24) (18 - 20)  SpO2: 98% (24 Jan 2024 05:24) (98% - 98%)    Parameters below as of 24 Jan 2024 05:24  Patient On (Oxygen Delivery Method): room air          PHYSICAL EXAM:    GENERAL: NAD  HEAD:  Atraumatic, Normocephalic  CHEST/LUNG: Normal work of breathing  ABDOMEN: non distended, soft non tender, no guarding  EXTREMITIES:  calf soft, non tender b/l    I&O's Detail    23 Jan 2024 07:01  -  24 Jan 2024 07:00  --------------------------------------------------------  IN:    IV PiggyBack: 200 mL    Lactated Ringers: 1440 mL  Total IN: 1640 mL    OUT:    Voided (mL): 3050 mL  Total OUT: 3050 mL    Total NET: -1410 mL  
                          Patient: LAURA SALDAÑA [id number] 68y Male                            Hospitalist Attending Note    Seen at approximately 2pm   c/o R flank pain  No fever / chills.    ____________________PHYSICAL EXAM:  GENERAL:  NAD Alert and Oriented x 3   HEENT: NCAT  CARDIOVASCULAR:  S1, S2  LUNGS: CTAB  ABDOMEN:  soft, (-) tenderness, (-) distension, (+) bowel sounds, (-) guarding, (-) rebound (-) rigidity  EXTREMITIES:  no cyanosis / clubbing / edema.   ____________________    T 98.4 , /63 R 20 SaO2 96% on RA    WBC 10.19, Hgb 10.4, Hct 31.1 Plt 167  Na 137 K 3.1 Cl 104 HCo3 27 BUN 20 Cr 1.02 Gluc 125  
Patient seen and examined at bedside complaining of suprapubic pain and chills.   Denies nausea/ vomiting, SOB, chest pain, calf tenderness.          Vital Signs Last 24 Hrs  T(F): 98.8 (01-23-24 @ 11:17), Max: 99.8 (01-23-24 @ 00:22)  HR: 74 (01-23-24 @ 11:17)  BP: 123/75 (01-23-24 @ 11:17)  RR: 20 (01-23-24 @ 11:17)  SpO2: 98% (01-23-24 @ 11:17)  Wt(kg): --   CAPILLARY BLOOD GLUCOSE      POCT Blood Glucose.: 126 mg/dL (21 Jan 2024 13:25)      GENERAL: Alert, NAD  CHEST/LUNG: Respirations non labored, good inspiratory effort  HEART: S1S2  HEENT: NCAT, EOMI, conjunctiva clear   ABDOMEN: Nondistended, + bowel sounds, nontender  : Uncircumcised phallus with no discharge at meatus   EXTREMITIES:  No calf tenderness, no edema    I&O's Detail    22 Jan 2024 07:01  -  23 Jan 2024 07:00  --------------------------------------------------------  IN:    Lactated Ringers: 1440 mL  Total IN: 1440 mL    OUT:  Total OUT: 0 mL    Total NET: 1440 mL          LABS:                        10.1   4.74  )-----------( 177      ( 23 Jan 2024 07:40 )             29.9     01-23    137  |  101  |  15  ----------------------------<  113<H>  3.4<L>   |  29  |  0.87    Ca    8.4<L>      23 Jan 2024 07:40    TPro  6.8  /  Alb  2.5<L>  /  TBili  0.8  /  DBili  x   /  AST  62<H>  /  ALT  104<H>  /  AlkPhos  95  01-22        RADIOLOGY & ADDITIONAL STUDIES:    < from: CT Abdomen and Pelvis w/ IV Cont (01.21.24 @ 15:27) >    ACC: [id number] EXAM:  CT ABDOMEN AND PELVIS IC   ORDERED BY: ROMAN PADGETT     PROCEDURE DATE:  01/21/2024          INTERPRETATION:  CLINICAL INFORMATION: Nausea vomiting and diarrhea,   chills.    COMPARISON: CT abdomen pelvis dated 12/28/2023.    CONTRAST/COMPLICATIONS:  IV Contrast: Omnipaque 350  90 cc administered   10 cc discarded  Oral Contrast: NONE  Complications: None reported at time of study completion    PROCEDURE:  CT of the Abdomen and Pelvis was performed.  Sagittal and coronal reformats were performed.    FINDINGS:  LOWER CHEST: Within normal limits.    LIVER: Within normal limits.  BILE DUCTS: Normal caliber.  GALLBLADDER: Cholecystectomy.  SPLEEN: Within normal limits.  PANCREAS: Within normal limits.  ADRENALS: Within normal limits.  KIDNEYS/URETERS: Moderate right hydroureteronephrosis with several   calculi in the distal ureter measuring up to 5 mm.    BLADDER: Air in bladder lumen. Mural thickening and perivesicular soft   tissue infiltration.  REPRODUCTIVE ORGANS:Prostate is enlarged.    BOWEL: No bowel obstruction. Short segment stricture with mural   thickening in the high rectum (2, 128). Appendix is normal.  PERITONEUM: No ascites.  VESSELS: Within normal limits.  RETROPERITONEUM/LYMPH NODES: No lymphadenopathy.  ABDOMINAL WALL: Within normal limits.  BONES: Right hip arthroplasty. Disc degeneration lumbosacral junction.    IMPRESSION:  Moderate right hydroureteronephrosis secondary to obstructive calculi in   the distal ureter.    Short segment stricture in the high rectum for which neoplasm is not   excluded. Suggest colonoscopic correlation.        --- End of Report ---            NANI NAVAS MD; Attending Radiologist  This document has been electronically signed. Jan 21 2024  3:55PM    < end of copied text >

## 2024-01-24 NOTE — DISCHARGE NOTE NURSING/CASE MANAGEMENT/SOCIAL WORK - NSDCVIVACCINE_GEN_ALL_CORE_FT
Tdap; 09-May-2019 17:12; Jeannie Matthews (RN); [employer]; [id number] (Exp. Date: 26-Feb-2021); IntraMuscular; Deltoid Left.; 0.5 milliLiter(s); VIS (VIS Published: 09-May-2013, VIS Presented: 09-May-2019);

## 2024-01-24 NOTE — DISCHARGE NOTE PROVIDER - NSDCCPCAREPLAN_GEN_ALL_CORE_FT
PRINCIPAL DISCHARGE DIAGNOSIS  Diagnosis: UTI (urinary tract infection)  Assessment and Plan of Treatment:       SECONDARY DISCHARGE DIAGNOSES  Diagnosis: Right renal stone  Assessment and Plan of Treatment:

## 2024-01-24 NOTE — DISCHARGE NOTE PROVIDER - NSDCMRMEDTOKEN_GEN_ALL_CORE_FT
amLODIPine 10 mg oral tablet: 1 tab(s) orally once a day  amoxicillin-clavulanate 875 mg-125 mg oral tablet: 875 milligram(s) orally 2 times a day  atorvastatin 40 mg oral tablet: 1 tab(s) orally once a day (at bedtime)  hydroCHLOROthiazide 12.5 mg oral capsule: 1 cap(s) orally once a day  metoprolol succinate 25 mg oral tablet, extended release: 1 tab(s) orally once a day  Percocet 5 mg-325 mg oral tablet: 1 tab(s) orally 2 times a day as needed for  moderate pain 2 tabs MDD  tamsulosin 0.4 mg oral capsule: 1 cap(s) orally once a day (at bedtime)  valsartan 320 mg oral tablet: 1 tab(s) orally once a day

## 2024-01-24 NOTE — DISCHARGE NOTE PROVIDER - CARE PROVIDER_API CALL
PMD,   Phone: (   )    -  Fax: (   )    -  Follow Up Time: 2 weeks    Louis Malone  Urology  [address] [place] [address]  Phone: [phone]  Fax: [phone]  Follow Up Time: 1 week

## 2024-01-24 NOTE — DISCHARGE NOTE NURSING/CASE MANAGEMENT/SOCIAL WORK - PATIENT PORTAL LINK FT
You can access the FollowMyHealth Patient Portal offered by [facility] by registering at the following website: [email or web address]. By joining [facility]’s FollowMyHealth portal, you will also be able to view your health information using other applications (apps) compatible with our system.

## 2024-01-24 NOTE — PROGRESS NOTE ADULT - ASSESSMENT
68 year old male with a PMH of HTN, HLD, prostate CA, EtOH abuse (last drink 1 month ago), nephrolithiasis h/o ESWL in past and stent, had recent right ureteroscopy (12/29/23)  BIBEMS for flu like symptoms.  Endorses 3 days history of congestion, cough, shaking chills, N/V/D + sick contacts ().  Also endorses R-flank pain. Denies fever, chest pain, SOB, dysuria, hematuria. Labs remarkable for WBC: 14.22, UA(+).            Problem/Plan - 1:  ·  Problem: Nephrolithiasis / UTI  ·  Plan: History of Nephrolithiasis  Recent right ureteroscopy (12/29/23)  CT-Abd; Moderate right hydroureteronephrosis secondary to obstructive calculi in the distal ureter.  Urology consulted   no surgical intervention required. recommend dc planning with analgesia   - change Zosyn to po to augmentin   - Tamsulosin  - UCx growing E faecalis     Problem/Plan - 2:  ·  Problem: Upper respiratory infection.   ·  Plan: 3 days history of URI symptoms   RVP (-)   - Supportive care   - Mucinex for cough.     Problem/Plan - 3:  ·  Problem: Hypertension.   ·  Plan: Continue home meds   - Amlodipine 10mg   - Metoprolol Succ 25mg   - 12.5mg   - Valsartan 320mg.

## 2024-01-24 NOTE — DISCHARGE NOTE PROVIDER - HOSPITAL COURSE
68 year old male with a PMH of HTN, HLD, prostate CA, EtOH abuse (last drink 1 month ago), nephrolithiasis h/o ESWL in past and stent, had recent right ureteroscopy (12/29/23)  BIBEMS for flu like symptoms.  Endorses 3 days history of congestion, cough, shaking chills, N/V/D + sick contacts ().  Also endorses R-flank pain. Denies fever, chest pain, SOB, dysuria, hematuria. Labs remarkable for WBC: 14.22, UA(+).     ·  Problem: Nephrolithiasis / UTI: seen by urology  ·  Plan: History of Nephrolithiasis  Recent right ureteroscopy (12/29/23)  CT-Abd; Moderate right hydroureteronephrosis secondary to obstructive calculi in the distal ureter.  Urology consulted.  - change Zosyn to po to Augmentin   - Tamsulosin  - UCx growing E faecalis    ·  Problem: Upper respiratory infection.     ·  Problem: Hypertension.   ·  Plan: Continue home meds   - Amlodipine 10mg   - Metoprolol Succ 25mg   - 12.5mg   - Valsartan 320mg.    Complete Augmentin x 4 more days.  Percocet for pain.

## 2024-01-24 NOTE — DISCHARGE NOTE PROVIDER - NSDCFUSCHEDAPPT_GEN_ALL_CORE_FT
Anil Gaona  [facility]  UROLOGY [address]  Scheduled Appointment: 02/15/2024    Anil Gaona  [facility]  UROLOGY [address]  Scheduled Appointment: 02/15/2024

## 2024-01-24 NOTE — PROGRESS NOTE ADULT - ASSESSMENT
67 Y/O male PMH prostate ca, HTN HLD nephrolithiasis with recent ureteroscopy, lithotripsy, removal of stones and insertion of stent.  Stent removed subsequently in office.    Has been febrile here with leukocytosis  Afebrile last 48h  leukocystosis remains WNL  OR cancelled, for now conservative management wi, but was canceled due to patient not wishing to move forward with procedure. After further review of CT scan, Dr. Malone recommended continued straining urine with anticipation for spontaneous passage. Patient now complaining of continue chills and suprapubic pain. UCx from 1/21 with prelimary enterococcus faecalis. Currently on Zosyn.       PLAN:      - Continue Flomax   - Continue ABx; Possible transition to oral ABx tomorrow   - Continue straining urine; monitor I&Os  - F/U AM labs   - Continue to monitor VS  - Continue care per primary team  67 Y/O male PMH prostate ca, HTN HLD nephrolithiasis with recent ureteroscopy, lithotripsy, removal of stones and insertion of stent.  Stent removed subsequently in office.    Has been febrile here with leukocytosis  Afebrile last 48h  leukocystosis remains WNL  OR cancelled, for now conservative management with IVF, tamsulosin, strain urine.    UCx from 1/21 with prelimary enterococcus faecalis. most recent cx normal summer.  Blood cx NGTD.   currently on Zosyn    - Continue Flomax   - has been on zosyn for 3 days, could transition to oral ABx for remaining Patient was admitted to [facility] for abdominal pain. Patient underwent  . The patient was admitted to the surgical floor for observation and pain management. The remainder of the hospital stay was uneventful and patient was stable for discharge  - Continue straining urine; monitor I&Os  - F/U AM labs    69 Y/O male PMH prostate ca, HTN HLD nephrolithiasis with recent ureteroscopy, lithotripsy, removal of stones and insertion of stent.  Stent removed subsequently in office.    Has been febrile here with leukocytosis  Afebrile last 48h  leukocystosis remains WNL  OR cancelled, for now conservative management with IVF, tamsulosin, strain urine.    UCx from 1/21 with prelimary enterococcus faecalis. most recent cx normal summer.  Blood cx NGTD.   currently on Zosyn    -ok from urology standpoint for dc  - Continue Flomax   - has been on zosyn for 3 days, can transition to oral ABx   - Continue straining urine  -follow up outpatient with Dr Malone

## 2024-01-27 LAB
CULTURE RESULTS: SIGNIFICANT CHANGE UP
SPECIMEN SOURCE: SIGNIFICANT CHANGE UP

## 2024-01-31 ENCOUNTER — APPOINTMENT (OUTPATIENT)
Dept: UROLOGY | Facility: CLINIC | Age: 69
End: 2024-01-31
Payer: MEDICARE

## 2024-01-31 DIAGNOSIS — E78.5 HYPERLIPIDEMIA, UNSPECIFIED: ICD-10-CM

## 2024-01-31 DIAGNOSIS — Z96.0 PRESENCE OF UROGENITAL IMPLANTS: ICD-10-CM

## 2024-01-31 DIAGNOSIS — M72.0 PALMAR FASCIAL FIBROMATOSIS [DUPUYTREN]: ICD-10-CM

## 2024-01-31 PROCEDURE — 76775 US EXAM ABDO BACK WALL LIM: CPT

## 2024-01-31 PROCEDURE — 99214 OFFICE O/P EST MOD 30 MIN: CPT

## 2024-01-31 RX ORDER — TAMSULOSIN HYDROCHLORIDE 0.4 MG/1
0.4 CAPSULE ORAL
Qty: 30 | Refills: 2 | Status: ACTIVE | COMMUNITY
Start: 2024-01-31 | End: 1900-01-01

## 2024-01-31 NOTE — HISTORY OF PRESENT ILLNESS
[FreeTextEntry1] : 12/26/2023: Mr. SALDAÑA is a 68 year male who presents today for multiple right ureteral stones. S/P ESWL of right renal pelvic stone.   ESWL of right renal pelvic stone subsequently multiple ureteral stones in right ureteral. On 12/5/23 had ureteral lithotripsy and insertion of stent. More stones were present and will require ureteroscopy to remove these stones.   Patient marked urgency and frequency secondary to right ureteral stent.  since Sunday, Duran fever. Burning and frequent urination is most likely secondary to stent. Will collect urine for Urinalysis and Culture. Will collect urine to r/o infection. Will get UA/Culture.  Recommended to continue taking pain medication, and if worsens to go to the ER.   O/E: circumcised phallus, adequate meatus, both testes descended, nontender, no mass palpable.  will schedule for right ureterolithotripsy and removal of stones.      01/17/2024: Mr. SALDAÑA is a 68 year male who presents today for a follow up for   Pt had right mid pole staghorn stone. Was treated with ESWL by another urologist.  On 12/1/23 came to [facility] with severe renal colic. found to have multiple ureteral stones and stones in the mid-pole and lower right kidney. some of the stones were removed and stent was inserted. On 12/29/23 pt was readmitted. Ureteroscopy was done. Stones from ureter were removed. Stones in middle and lower pole were dusted and stent was inserted. today stent was removed. stones were composed of calcium oxalate monohydrate stone. will get litholink to analysis   CA prostate: treated with radiation for 5 days will follow PSA.   RTC: 1 month for renal sonogram , PSA possible litholink.         01/31/2024: Mr. SALDAÑA is a 68 year male who presents today for a follow up for right ureteral stone.  3 days after removing stent, pt attended the [facility] ER for chills, elevated WBCs and low grade fever. CT revealed small stones at right UVJ with hydronephrosis. Treated with Rocephin, wbc were normal. Pain subsided. Was discharged on Ceftin and Tamsulosin.  Here today for follow up. Occasional pain right side, pain at the tip of the penis. NO chills or fever.  Renal sonogram Findings: There is a 5 mm stone in the bladder on the right side. The right kidney demonstrates moderate to severe hydronephrosis. There is a simple cyst in the lower pole of the left kidney with no flow. Both kidneys are normal in size and echogenicity without solid masses visualized  Pt state he has not been taking Tamsulosin as he does not have it. Represcribed and advised to take it every night.  RTC: 2 weeks to repeat renal sonogram.

## 2024-02-02 DIAGNOSIS — Z23 ENCOUNTER FOR IMMUNIZATION: ICD-10-CM

## 2024-02-02 DIAGNOSIS — J06.9 ACUTE UPPER RESPIRATORY INFECTION, UNSPECIFIED: ICD-10-CM

## 2024-02-02 DIAGNOSIS — I10 ESSENTIAL (PRIMARY) HYPERTENSION: ICD-10-CM

## 2024-02-02 DIAGNOSIS — E78.5 HYPERLIPIDEMIA, UNSPECIFIED: ICD-10-CM

## 2024-02-02 DIAGNOSIS — Z96.641 PRESENCE OF RIGHT ARTIFICIAL HIP JOINT: ICD-10-CM

## 2024-02-02 DIAGNOSIS — Z85.46 PERSONAL HISTORY OF MALIGNANT NEOPLASM OF PROSTATE: ICD-10-CM

## 2024-02-02 DIAGNOSIS — Z88.5 ALLERGY STATUS TO NARCOTIC AGENT: ICD-10-CM

## 2024-02-02 DIAGNOSIS — B95.2 ENTEROCOCCUS AS THE CAUSE OF DISEASES CLASSIFIED ELSEWHERE: ICD-10-CM

## 2024-02-02 DIAGNOSIS — N13.6 PYONEPHROSIS: ICD-10-CM

## 2024-02-02 DIAGNOSIS — F10.10 ALCOHOL ABUSE, UNCOMPLICATED: ICD-10-CM

## 2024-02-05 ENCOUNTER — EMERGENCY (EMERGENCY)
Facility: HOSPITAL | Age: 69
LOS: 0 days | Discharge: HOME | End: 2024-02-06
Attending: EMERGENCY MEDICINE
Payer: MEDICARE

## 2024-02-05 VITALS
OXYGEN SATURATION: 98 % | RESPIRATION RATE: 18 BRPM | WEIGHT: 216.93 LBS | TEMPERATURE: 98 F | HEIGHT: 77 IN | DIASTOLIC BLOOD PRESSURE: 67 MMHG | HEART RATE: 80 BPM | SYSTOLIC BLOOD PRESSURE: 103 MMHG

## 2024-02-05 VITALS — SYSTOLIC BLOOD PRESSURE: 95 MMHG | DIASTOLIC BLOOD PRESSURE: 64 MMHG

## 2024-02-05 DIAGNOSIS — N23 UNSPECIFIED RENAL COLIC: ICD-10-CM

## 2024-02-05 DIAGNOSIS — Z88.5 ALLERGY STATUS TO NARCOTIC AGENT: ICD-10-CM

## 2024-02-05 DIAGNOSIS — R31.9 HEMATURIA, UNSPECIFIED: ICD-10-CM

## 2024-02-05 DIAGNOSIS — Z96.641 PRESENCE OF RIGHT ARTIFICIAL HIP JOINT: Chronic | ICD-10-CM

## 2024-02-05 DIAGNOSIS — R10.9 UNSPECIFIED ABDOMINAL PAIN: ICD-10-CM

## 2024-02-05 DIAGNOSIS — Z87.438 PERSONAL HISTORY OF OTHER DISEASES OF MALE GENITAL ORGANS: ICD-10-CM

## 2024-02-05 DIAGNOSIS — Z87.442 PERSONAL HISTORY OF URINARY CALCULI: ICD-10-CM

## 2024-02-05 PROCEDURE — 99284 EMERGENCY DEPT VISIT MOD MDM: CPT

## 2024-02-06 LAB
ALBUMIN SERPL ELPH-MCNC: 2.9 G/DL — LOW (ref 3.3–5)
ALP SERPL-CCNC: 91 U/L — SIGNIFICANT CHANGE UP (ref 40–120)
ALT FLD-CCNC: 30 U/L — SIGNIFICANT CHANGE UP (ref 12–78)
ANION GAP SERPL CALC-SCNC: 5 MMOL/L — SIGNIFICANT CHANGE UP (ref 5–17)
APPEARANCE UR: ABNORMAL
AST SERPL-CCNC: 20 U/L — SIGNIFICANT CHANGE UP (ref 15–37)
BACTERIA # UR AUTO: ABNORMAL /HPF
BASOPHILS # BLD AUTO: 0.04 K/UL — SIGNIFICANT CHANGE UP (ref 0–0.2)
BASOPHILS NFR BLD AUTO: 0.7 % — SIGNIFICANT CHANGE UP (ref 0–2)
BILIRUB SERPL-MCNC: 0.3 MG/DL — SIGNIFICANT CHANGE UP (ref 0.2–1.2)
BILIRUB UR-MCNC: NEGATIVE — SIGNIFICANT CHANGE UP
BUN SERPL-MCNC: 22 MG/DL — SIGNIFICANT CHANGE UP (ref 7–23)
CALCIUM SERPL-MCNC: 9 MG/DL — SIGNIFICANT CHANGE UP (ref 8.5–10.1)
CHLORIDE SERPL-SCNC: 110 MMOL/L — HIGH (ref 96–108)
CO2 SERPL-SCNC: 26 MMOL/L — SIGNIFICANT CHANGE UP (ref 22–31)
COLOR SPEC: ABNORMAL
COMMENT - URINE: SIGNIFICANT CHANGE UP
CREAT SERPL-MCNC: 1.15 MG/DL — SIGNIFICANT CHANGE UP (ref 0.5–1.3)
DIFF PNL FLD: ABNORMAL
EGFR: 70 ML/MIN/1.73M2 — SIGNIFICANT CHANGE UP
EOSINOPHIL # BLD AUTO: 0.24 K/UL — SIGNIFICANT CHANGE UP (ref 0–0.5)
EOSINOPHIL NFR BLD AUTO: 4.4 % — SIGNIFICANT CHANGE UP (ref 0–6)
EPI CELLS # UR: PRESENT
GLUCOSE SERPL-MCNC: 114 MG/DL — HIGH (ref 70–99)
GLUCOSE UR QL: NEGATIVE MG/DL — SIGNIFICANT CHANGE UP
HCT VFR BLD CALC: 33 % — LOW (ref 39–50)
HGB BLD-MCNC: 10.6 G/DL — LOW (ref 13–17)
HYALINE CASTS # UR AUTO: SIGNIFICANT CHANGE UP
IMM GRANULOCYTES NFR BLD AUTO: 0.9 % — SIGNIFICANT CHANGE UP (ref 0–0.9)
KETONES UR-MCNC: ABNORMAL MG/DL
LEUKOCYTE ESTERASE UR-ACNC: ABNORMAL
LIDOCAIN IGE QN: 34 U/L — SIGNIFICANT CHANGE UP (ref 13–75)
LYMPHOCYTES # BLD AUTO: 1.85 K/UL — SIGNIFICANT CHANGE UP (ref 1–3.3)
LYMPHOCYTES # BLD AUTO: 33.8 % — SIGNIFICANT CHANGE UP (ref 13–44)
MCHC RBC-ENTMCNC: 28.4 PG — SIGNIFICANT CHANGE UP (ref 27–34)
MCHC RBC-ENTMCNC: 32.1 G/DL — SIGNIFICANT CHANGE UP (ref 32–36)
MCV RBC AUTO: 88.5 FL — SIGNIFICANT CHANGE UP (ref 80–100)
MONOCYTES # BLD AUTO: 0.44 K/UL — SIGNIFICANT CHANGE UP (ref 0–0.9)
MONOCYTES NFR BLD AUTO: 8 % — SIGNIFICANT CHANGE UP (ref 2–14)
NEUTROPHILS # BLD AUTO: 2.85 K/UL — SIGNIFICANT CHANGE UP (ref 1.8–7.4)
NEUTROPHILS NFR BLD AUTO: 52.2 % — SIGNIFICANT CHANGE UP (ref 43–77)
NITRITE UR-MCNC: NEGATIVE — SIGNIFICANT CHANGE UP
NRBC # BLD: 0 /100 WBCS — SIGNIFICANT CHANGE UP (ref 0–0)
PH UR: 5.5 — SIGNIFICANT CHANGE UP (ref 5–8)
PLATELET # BLD AUTO: 338 K/UL — SIGNIFICANT CHANGE UP (ref 150–400)
POTASSIUM SERPL-MCNC: 3.7 MMOL/L — SIGNIFICANT CHANGE UP (ref 3.5–5.3)
POTASSIUM SERPL-SCNC: 3.7 MMOL/L — SIGNIFICANT CHANGE UP (ref 3.5–5.3)
PROT SERPL-MCNC: 6.9 GM/DL — SIGNIFICANT CHANGE UP (ref 6–8.3)
PROT UR-MCNC: 100 MG/DL
RBC # BLD: 3.73 M/UL — LOW (ref 4.2–5.8)
RBC # FLD: 14.3 % — SIGNIFICANT CHANGE UP (ref 10.3–14.5)
RBC CASTS # UR COMP ASSIST: SIGNIFICANT CHANGE UP /HPF (ref 0–4)
SODIUM SERPL-SCNC: 141 MMOL/L — SIGNIFICANT CHANGE UP (ref 135–145)
SP GR SPEC: 1.02 — SIGNIFICANT CHANGE UP (ref 1–1.03)
UROBILINOGEN FLD QL: 0.2 MG/DL — SIGNIFICANT CHANGE UP (ref 0.2–1)
WBC # BLD: 5.47 K/UL — SIGNIFICANT CHANGE UP (ref 3.8–10.5)
WBC # FLD AUTO: 5.47 K/UL — SIGNIFICANT CHANGE UP (ref 3.8–10.5)
WBC UR QL: SIGNIFICANT CHANGE UP /HPF (ref 0–5)

## 2024-02-06 PROCEDURE — 76870 US EXAM SCROTUM: CPT | Mod: 26

## 2024-02-06 PROCEDURE — 74176 CT ABD & PELVIS W/O CONTRAST: CPT | Mod: 26,MA

## 2024-02-06 RX ORDER — OXYCODONE AND ACETAMINOPHEN 5; 325 MG/1; MG/1
1 TABLET ORAL
Qty: 12 | Refills: 0
Start: 2024-02-06 | End: 2024-02-08

## 2024-02-06 RX ORDER — TAMSULOSIN HYDROCHLORIDE 0.4 MG/1
1 CAPSULE ORAL
Qty: 7 | Refills: 0
Start: 2024-02-06 | End: 2024-02-12

## 2024-02-06 RX ORDER — IBUPROFEN 200 MG
1 TABLET ORAL
Qty: 20 | Refills: 0
Start: 2024-02-06 | End: 2024-02-10

## 2024-02-06 RX ORDER — KETOROLAC TROMETHAMINE 30 MG/ML
15 SYRINGE (ML) INJECTION ONCE
Refills: 0 | Status: DISCONTINUED | OUTPATIENT
Start: 2024-02-06 | End: 2024-02-06

## 2024-02-06 RX ORDER — TAMSULOSIN HYDROCHLORIDE 0.4 MG/1
0.4 CAPSULE ORAL ONCE
Refills: 0 | Status: COMPLETED | OUTPATIENT
Start: 2024-02-06 | End: 2024-02-06

## 2024-02-06 RX ADMIN — Medication 15 MILLIGRAM(S): at 04:20

## 2024-02-06 RX ADMIN — TAMSULOSIN HYDROCHLORIDE 0.4 MILLIGRAM(S): 0.4 CAPSULE ORAL at 06:22

## 2024-02-06 NOTE — ED PROVIDER NOTE - CLINICAL SUMMARY MEDICAL DECISION MAKING FREE TEXT BOX
Right groin and abdominal pain radiating to testicle hx of renal stones.  Abdomen soft non guarding on exam.  Low suspicion for testicular torsion but will check ultrasound.  More likely renal stone.  Will check UA, labs, CT, give medication for pain and Re-eval.    CT shows renal stone.  Flomax ordered.  Pain controlled.  Return precautions and follow-up discussed.

## 2024-02-06 NOTE — ED PROVIDER NOTE - PATIENT PORTAL LINK FT
You can access the FollowMyHealth Patient Portal offered by [facility] by registering at the following website: [email or web address]. By joining [employer]’s FollowMyHealth portal, you will also be able to view your health information using other applications (apps) compatible with our system.

## 2024-02-06 NOTE — ED PROVIDER NOTE - CARE PROVIDER_API CALL
Louis Malone.  Urology  [address] [id number]  Phone: [phone]  Fax: [phone]  Follow Up Time:

## 2024-02-06 NOTE — ED PROVIDER NOTE - NSFOLLOWUPINSTRUCTIONS_ED_ALL_ED_FT
1) Take tylenol for pain  2) Take prescription medication as instructed  3) Follow-up with your urologist  4) Follow up with your primary care doctor  5) Return to the ER for worsening or concerning symptoms

## 2024-02-06 NOTE — ED ADULT NURSE NOTE - OBJECTIVE STATEMENT
Pt is a 68y M AOx3 with a pmh of kidney stones. Pt BIBA for lower abdominal pain and blood in urine. Pt states this is similar to the last time he had a kidney stone. Pt rates pain a 9 out of 10. Pt denies n/v/d, cp, dizziness or headache. Pt also reports drinking cognac before coming to the ED

## 2024-02-06 NOTE — ED PROVIDER NOTE - OBJECTIVE STATEMENT
This patient is a 68 year old man hx of renal stones and prostate cancer followed by urology who presents to the ER c/o right groin pain and hematuria that began today.  He describes the pain as constant and sharp 10/10 in severity and radiates to his right testicle.  He denies fever, dysuria, vomiting and penile discharge.  He has tried taking tylenol for pain.  Patient reports that he was last seen by his urologist last week and is completing a course of antibiotics.

## 2024-02-06 NOTE — ED ADULT NURSE REASSESSMENT NOTE - NS ED NURSE REASSESS COMMENT FT1
Patient came to triage: yelling and cursing: "When the fuck are you people going to help me. I came in by ambulance because I am fucking pissing blood. I am in pain. I have fucking kidney stones. I had to wait a long time the last fucking time I was here Too." Patient given emotional support, advised that we have not forgotten him and that they will be taking him back. soon.

## 2024-02-06 NOTE — ED ADULT NURSE NOTE - PATIENT'S PREFERRED PRONOUN
Pt to the ED via NP and LP from Dr. Sae Trevizo office. Dr. Oksana Pendleton is concerned regarding patients kidney function. Patient presents after using fentanyl this morning. Patient states that he uses fentanyl in the morning, afternoon and night. Patient is alert and oriented x 3. Respirations are regular and unlabored. Patient provided blanket. Call light within reach.      Brett Rolon RN  08/05/21 48 Keli Rodriguez RN  08/05/21 0591 Him/He

## 2024-02-08 ENCOUNTER — APPOINTMENT (OUTPATIENT)
Dept: UROLOGY | Facility: CLINIC | Age: 69
End: 2024-02-08
Payer: MEDICARE

## 2024-02-08 VITALS — DIASTOLIC BLOOD PRESSURE: 80 MMHG | SYSTOLIC BLOOD PRESSURE: 134 MMHG | HEART RATE: 73 BPM | OXYGEN SATURATION: 96 %

## 2024-02-08 DIAGNOSIS — C61 MALIGNANT NEOPLASM OF PROSTATE: ICD-10-CM

## 2024-02-08 DIAGNOSIS — I10 ESSENTIAL (PRIMARY) HYPERTENSION: ICD-10-CM

## 2024-02-08 DIAGNOSIS — N20.1 CALCULUS OF URETER: ICD-10-CM

## 2024-02-08 DIAGNOSIS — N13.30 UNSPECIFIED HYDRONEPHROSIS: ICD-10-CM

## 2024-02-08 LAB
-  AMPICILLIN: SIGNIFICANT CHANGE UP
-  CIPROFLOXACIN: SIGNIFICANT CHANGE UP
-  LEVOFLOXACIN: SIGNIFICANT CHANGE UP
-  NITROFURANTOIN: SIGNIFICANT CHANGE UP
-  TETRACYCLINE: SIGNIFICANT CHANGE UP
-  VANCOMYCIN: SIGNIFICANT CHANGE UP
CULTURE RESULTS: ABNORMAL
METHOD TYPE: SIGNIFICANT CHANGE UP
ORGANISM # SPEC MICROSCOPIC CNT: ABNORMAL
ORGANISM # SPEC MICROSCOPIC CNT: SIGNIFICANT CHANGE UP
SPECIMEN SOURCE: SIGNIFICANT CHANGE UP

## 2024-02-08 PROCEDURE — 76775 US EXAM ABDO BACK WALL LIM: CPT

## 2024-02-08 PROCEDURE — 99213 OFFICE O/P EST LOW 20 MIN: CPT

## 2024-02-08 NOTE — HISTORY OF PRESENT ILLNESS
[FreeTextEntry1] : 12/26/2023: Mr. SALDAÑA is a 68 year male who presents today for multiple right ureteral stones. S/P ESWL of right renal pelvic stone.   ESWL of right renal pelvic stone subsequently multiple ureteral stones in right ureteral. On 12/5/23 had ureteral lithotripsy and insertion of stent. More stones were present and will require ureteroscopy to remove these stones.   Patient marked urgency and frequency secondary to right ureteral stent.  since Sunday, Duran fever. Burning and frequent urination is most likely secondary to stent. Will collect urine for Urinalysis and Culture. Will collect urine to r/o infection. Will get UA/Culture.  Recommended to continue taking pain medication, and if worsens to go to the ER.   O/E: circumcised phallus, adequate meatus, both testes descended, nontender, no mass palpable.  will schedule for right ureterolithotripsy and removal of stones.      01/17/2024: Mr. SALDAÑA is a 68 year male who presents today for a follow up for   Pt had right mid pole staghorn stone. Was treated with ESWL by another urologist.  On 12/1/23 came to [facility] with severe renal colic. found to have multiple ureteral stones and stones in the mid-pole and lower right kidney. some of the stones were removed and stent was inserted. On 12/29/23 pt was readmitted. Ureteroscopy was done. Stones from ureter were removed. Stones in middle and lower pole were dusted and stent was inserted. today stent was removed. stones were composed of calcium oxalate monohydrate stone. will get litholink to analysis   CA prostate: treated with radiation for 5 days will follow PSA.   RTC: 1 month for renal sonogram , PSA possible litholink.         01/31/2024: Mr. SALDAÑA is a 68 year male who presents today for a follow up for right ureteral stone.  3 days after removing stent, pt attended the [facility] ER for chills, elevated WBCs and low grade fever. CT revealed small stones at right UVJ with hydronephrosis. Treated with Rocephin, wbc were normal. Pain subsided. Was discharged on Ceftin and Tamsulosin.  Here today for follow up. Occasional pain right side, pain at the tip of the penis. NO chills or fever.  Renal sonogram Findings: There is a 5 mm stone in the bladder on the right side. The right kidney demonstrates moderate to severe hydronephrosis. There is a simple cyst in the lower pole of the left kidney with no flow. Both kidneys are normal in size and echogenicity without solid masses visualized  Pt state he has not been taking Tamsulosin as he does not have it. Represcribed and advised to take it every night.  RTC: 2 weeks to repeat renal sonogram.     02/08/2024: Had renal colic secodary to right distal ureteral stone, treated conservativly with antibiotics. Discharged home.  02/06/2024: gross hematuria. [facility] ER, CT scan right htdri with stoen at right UVJ, Today: no pain. Renal sonogram: stone in the bladder, no hydronephrosis. Has tingling while passing urine. Most likely secondary to bladder stone. RTC: 1 month for Follow up : UA, Culture, Uro -flow, and PVR

## 2024-02-08 NOTE — REASON FOR VISIT
L/m to schedule pt's follow up with Dr Dorys Almanza
[Follow-up Visit ___] : a follow-up visit  for [unfilled]

## 2024-02-15 ENCOUNTER — APPOINTMENT (OUTPATIENT)
Dept: UROLOGY | Facility: CLINIC | Age: 69
End: 2024-02-15

## 2024-03-02 ENCOUNTER — EMERGENCY (EMERGENCY)
Facility: HOSPITAL | Age: 69
LOS: 0 days | Discharge: HOME | End: 2024-03-02
Payer: MEDICARE

## 2024-03-02 VITALS
HEART RATE: 71 BPM | DIASTOLIC BLOOD PRESSURE: 100 MMHG | TEMPERATURE: 98 F | SYSTOLIC BLOOD PRESSURE: 154 MMHG | RESPIRATION RATE: 16 BRPM | OXYGEN SATURATION: 98 %

## 2024-03-02 VITALS
TEMPERATURE: 97 F | HEIGHT: 77 IN | DIASTOLIC BLOOD PRESSURE: 86 MMHG | RESPIRATION RATE: 20 BRPM | OXYGEN SATURATION: 95 % | SYSTOLIC BLOOD PRESSURE: 160 MMHG | WEIGHT: 216.93 LBS | HEART RATE: 68 BPM

## 2024-03-02 DIAGNOSIS — Z98.890 OTHER SPECIFIED POSTPROCEDURAL STATES: ICD-10-CM

## 2024-03-02 DIAGNOSIS — R30.0 DYSURIA: ICD-10-CM

## 2024-03-02 DIAGNOSIS — N39.0 URINARY TRACT INFECTION, SITE NOT SPECIFIED: ICD-10-CM

## 2024-03-02 DIAGNOSIS — E78.5 HYPERLIPIDEMIA, UNSPECIFIED: ICD-10-CM

## 2024-03-02 DIAGNOSIS — Z87.442 PERSONAL HISTORY OF URINARY CALCULI: ICD-10-CM

## 2024-03-02 DIAGNOSIS — R31.9 HEMATURIA, UNSPECIFIED: ICD-10-CM

## 2024-03-02 DIAGNOSIS — I10 ESSENTIAL (PRIMARY) HYPERTENSION: ICD-10-CM

## 2024-03-02 DIAGNOSIS — Z88.5 ALLERGY STATUS TO NARCOTIC AGENT: ICD-10-CM

## 2024-03-02 DIAGNOSIS — Z96.641 PRESENCE OF RIGHT ARTIFICIAL HIP JOINT: Chronic | ICD-10-CM

## 2024-03-02 DIAGNOSIS — N13.2 HYDRONEPHROSIS WITH RENAL AND URETERAL CALCULOUS OBSTRUCTION: ICD-10-CM

## 2024-03-02 LAB
ALBUMIN SERPL ELPH-MCNC: 3.3 G/DL — SIGNIFICANT CHANGE UP (ref 3.3–5)
ALP SERPL-CCNC: 73 U/L — SIGNIFICANT CHANGE UP (ref 40–120)
ALT FLD-CCNC: 19 U/L — SIGNIFICANT CHANGE UP (ref 12–78)
ANION GAP SERPL CALC-SCNC: 6 MMOL/L — SIGNIFICANT CHANGE UP (ref 5–17)
APPEARANCE UR: ABNORMAL
AST SERPL-CCNC: 14 U/L — LOW (ref 15–37)
BACTERIA # UR AUTO: ABNORMAL /HPF
BASOPHILS # BLD AUTO: 0.02 K/UL — SIGNIFICANT CHANGE UP (ref 0–0.2)
BASOPHILS NFR BLD AUTO: 0.5 % — SIGNIFICANT CHANGE UP (ref 0–2)
BILIRUB SERPL-MCNC: 0.5 MG/DL — SIGNIFICANT CHANGE UP (ref 0.2–1.2)
BILIRUB UR-MCNC: ABNORMAL
BUN SERPL-MCNC: 21 MG/DL — SIGNIFICANT CHANGE UP (ref 7–23)
CALCIUM SERPL-MCNC: 9 MG/DL — SIGNIFICANT CHANGE UP (ref 8.5–10.1)
CHLORIDE SERPL-SCNC: 113 MMOL/L — HIGH (ref 96–108)
CO2 SERPL-SCNC: 23 MMOL/L — SIGNIFICANT CHANGE UP (ref 22–31)
COLOR SPEC: ABNORMAL
CREAT SERPL-MCNC: 0.71 MG/DL — SIGNIFICANT CHANGE UP (ref 0.5–1.3)
DIFF PNL FLD: ABNORMAL
EGFR: 101 ML/MIN/1.73M2 — SIGNIFICANT CHANGE UP
EOSINOPHIL # BLD AUTO: 0.22 K/UL — SIGNIFICANT CHANGE UP (ref 0–0.5)
EOSINOPHIL NFR BLD AUTO: 5.2 % — SIGNIFICANT CHANGE UP (ref 0–6)
EPI CELLS # UR: PRESENT
GLUCOSE SERPL-MCNC: 103 MG/DL — HIGH (ref 70–99)
GLUCOSE UR QL: NEGATIVE MG/DL — SIGNIFICANT CHANGE UP
HCT VFR BLD CALC: 32.9 % — LOW (ref 39–50)
HGB BLD-MCNC: 10.8 G/DL — LOW (ref 13–17)
IMM GRANULOCYTES NFR BLD AUTO: 0.2 % — SIGNIFICANT CHANGE UP (ref 0–0.9)
KETONES UR-MCNC: NEGATIVE MG/DL — SIGNIFICANT CHANGE UP
LEUKOCYTE ESTERASE UR-ACNC: ABNORMAL
LYMPHOCYTES # BLD AUTO: 1.42 K/UL — SIGNIFICANT CHANGE UP (ref 1–3.3)
LYMPHOCYTES # BLD AUTO: 33.3 % — SIGNIFICANT CHANGE UP (ref 13–44)
MCHC RBC-ENTMCNC: 28.2 PG — SIGNIFICANT CHANGE UP (ref 27–34)
MCHC RBC-ENTMCNC: 32.8 G/DL — SIGNIFICANT CHANGE UP (ref 32–36)
MCV RBC AUTO: 85.9 FL — SIGNIFICANT CHANGE UP (ref 80–100)
MONOCYTES # BLD AUTO: 0.4 K/UL — SIGNIFICANT CHANGE UP (ref 0–0.9)
MONOCYTES NFR BLD AUTO: 9.4 % — SIGNIFICANT CHANGE UP (ref 2–14)
NEUTROPHILS # BLD AUTO: 2.2 K/UL — SIGNIFICANT CHANGE UP (ref 1.8–7.4)
NEUTROPHILS NFR BLD AUTO: 51.4 % — SIGNIFICANT CHANGE UP (ref 43–77)
NITRITE UR-MCNC: POSITIVE
NRBC # BLD: 0 /100 WBCS — SIGNIFICANT CHANGE UP (ref 0–0)
PH UR: 6 — SIGNIFICANT CHANGE UP (ref 5–8)
PLATELET # BLD AUTO: 296 K/UL — SIGNIFICANT CHANGE UP (ref 150–400)
POTASSIUM SERPL-MCNC: 3.9 MMOL/L — SIGNIFICANT CHANGE UP (ref 3.5–5.3)
POTASSIUM SERPL-SCNC: 3.9 MMOL/L — SIGNIFICANT CHANGE UP (ref 3.5–5.3)
PROT SERPL-MCNC: 7 GM/DL — SIGNIFICANT CHANGE UP (ref 6–8.3)
PROT UR-MCNC: 30 MG/DL
RBC # BLD: 3.83 M/UL — LOW (ref 4.2–5.8)
RBC # FLD: 14.7 % — HIGH (ref 10.3–14.5)
RBC CASTS # UR COMP ASSIST: ABNORMAL /HPF
SODIUM SERPL-SCNC: 142 MMOL/L — SIGNIFICANT CHANGE UP (ref 135–145)
SP GR SPEC: 1.02 — SIGNIFICANT CHANGE UP (ref 1–1.03)
UROBILINOGEN FLD QL: 0.2 MG/DL — SIGNIFICANT CHANGE UP (ref 0.2–1)
WBC # BLD: 4.27 K/UL — SIGNIFICANT CHANGE UP (ref 3.8–10.5)
WBC # FLD AUTO: 4.27 K/UL — SIGNIFICANT CHANGE UP (ref 3.8–10.5)
WBC UR QL: 11 /HPF — HIGH (ref 0–5)

## 2024-03-02 PROCEDURE — 74176 CT ABD & PELVIS W/O CONTRAST: CPT | Mod: 26,MC

## 2024-03-02 PROCEDURE — 99282 EMERGENCY DEPT VISIT SF MDM: CPT

## 2024-03-02 PROCEDURE — 99285 EMERGENCY DEPT VISIT HI MDM: CPT

## 2024-03-02 RX ORDER — CEFTRIAXONE 500 MG/1
1000 INJECTION, POWDER, FOR SOLUTION INTRAMUSCULAR; INTRAVENOUS ONCE
Refills: 0 | Status: COMPLETED | OUTPATIENT
Start: 2024-03-02 | End: 2024-03-02

## 2024-03-02 RX ORDER — ACETAMINOPHEN 500 MG
1000 TABLET ORAL ONCE
Refills: 0 | Status: COMPLETED | OUTPATIENT
Start: 2024-03-02 | End: 2024-03-02

## 2024-03-02 RX ORDER — CEFUROXIME AXETIL 250 MG
1 TABLET ORAL
Qty: 14 | Refills: 0
Start: 2024-03-02 | End: 2024-03-08

## 2024-03-02 RX ADMIN — CEFTRIAXONE 1000 MILLIGRAM(S): 500 INJECTION, POWDER, FOR SOLUTION INTRAMUSCULAR; INTRAVENOUS at 12:37

## 2024-03-02 RX ADMIN — Medication 1000 MILLIGRAM(S): at 11:58

## 2024-03-02 RX ADMIN — Medication 1000 MILLIGRAM(S): at 11:00

## 2024-03-02 RX ADMIN — Medication 400 MILLIGRAM(S): at 10:58

## 2024-03-02 RX ADMIN — CEFTRIAXONE 100 MILLIGRAM(S): 500 INJECTION, POWDER, FOR SOLUTION INTRAMUSCULAR; INTRAVENOUS at 12:10

## 2024-03-02 NOTE — ED PROVIDER NOTE - PROGRESS NOTE DETAILS
JOÃO Mathews: Workup Reviewed - Labs WNL/Not actionable at this time, UA w/ + nitrites, + large leukocytes, WBC 11, will order Ceftriaxone at this time. CT Renal Howard w/ 4 to 5 mm calculus at the distal right ureter/ureterovesicular junction, causing mild right hydroureteronephrosis. This right ureteral stone is without significant change in location as compared to the prior CT from 2/6/2024. There appears to be mild interval decrease in right hydroureteronephrosis. Limited evaluation of the urinary bladder due to underdistention and adjacent streak artifact. Within this limitation, the bladder appears thick-walled with perivesicular stranding. Short segment luminal narrowing or stricture in the high rectum for which   neoplasm is not excluded. Discussed case/consulted w/ Urology -  who requested 22 Fr 3-way Burt placement in ED, will come evaluate pt for further recommendations. JOÃO Mathews: Shared Decision Making - Discussed case w/ Urology PA who states pt is safe for DC home w/ outpatient follow-up with Dr. Castillo for OR planning to have stone removed. Patient is medically stable for discharge. Strict return precautions given, discussed red flag signs/symptoms. Patient to follow up with PMD, Urology. Patient/parent displays understanding and agreeable with plan, comfortable with discharge plan home. Plan for discharge discussed with JOÃO Upton who agrees with disposition and discharge plan. JOÃO Mathews: Workup Reviewed - Labs WNL/Not actionable at this time, no leukocytosis, Cr normal 0.71, UA w/ + nitrites, + large leukocytes, WBC 11, will order Ceftriaxone at this time. CT Renal Howard w/ 4 to 5 mm calculus at the distal right ureter/ureterovesicular junction, causing mild right hydroureteronephrosis. This right ureteral stone is without significant change in location as compared to the prior CT from 2/6/2024. There appears to be mild interval decrease in right hydroureteronephrosis. Limited evaluation of the urinary bladder due to underdistention and adjacent streak artifact. Within this limitation, the bladder appears thick-walled with perivesicular stranding. Short segment luminal narrowing or stricture in the high rectum for which neoplasm is not excluded. Discussed case/consulted w/ Urology -  who requested 22 Fr 3-way Burt placement in ED, will come evaluate pt for further recommendations.

## 2024-03-02 NOTE — ED ADULT TRIAGE NOTE - CHIEF COMPLAINT QUOTE
See' s big chunks of blood in the toilet started today while urinating burning feeling H/O HTN See' s big chunks of blood in the toilet started today while urinating burning feeling, denies blood thinners  H/O HTN

## 2024-03-02 NOTE — ED PROVIDER NOTE - GASTROINTESTINAL, MLM
Abdomen soft, non-tender, no guarding or rebound, bowel sounds present in all quadrants, no CVA tenderness bilaterally.

## 2024-03-02 NOTE — ED ADULT NURSE NOTE - OBJECTIVE STATEMENT
69 yo male complaining of hematuria x1 day, dysuria for x2 days; burning on urination. Hx kidney stones, UTIs; bladder nontender, nondistended. (-) f/c, cough,, cp , sob. gi changes

## 2024-03-02 NOTE — ED ADULT NURSE NOTE - CHIEF COMPLAINT QUOTE
See' s big chunks of blood in the toilet started today while urinating burning feeling, denies blood thinners  H/O HTN

## 2024-03-02 NOTE — CONSULT NOTE ADULT - SUBJECTIVE AND OBJECTIVE BOX
Chief Complaint:  Patient is a 68y old  Male who presents with a chief complaint of hematuria    HPI:    68M w/ PMH HTN, HLD, Prostate Cancer, Nephrolithiasis (w/ history of uteroscopy, lithotripsy, stents), ETOH Abuse who presents to ED w/ hematuria x today associated w/ dysuria. Pt w/ extensive history of Nephrolithiasis and UTI's in the past, pt states he had recent Kidney US performed outpatient w/ Urology Dr. Malone which were normal at that time w/ no kidney stones visualized, pt follows another Urologist for workup/management of Prostate Cancer. Denies fever, chills, chest pain, shortness of breath, abdominal pain, N/V/D, testicular pain, urinary frequency/urgency, extremity weakness/numbness/tingling, lightheadedness, dizziness, or headaches.    Interval Hx:     Urology consulted to evaluate hematuria. Patient recently seen in office by Dr. Malone for 5mm renal stone. Patient complaining of hematuria that started this morning. Denies fever, chills, pain, N/V/D.       PMH/PSH:PAST MEDICAL & SURGICAL HISTORY:  Hypertension      Prostate cancer      Alcohol abuse, daily use      S/P hip replacement, right          Allergies:  morphine (Nausea)      Medications:      Review of Systems:  Negative except for HPI    Relevant Family History:   FAMILY HISTORY:  FH: prostate cancer (Father, Sibling)        Relevant Social History: Alcohol ( -) , Tobacco ( -) , Illicit drugs (- )     Physical Exam:    Vital Signs:  Vital Signs Last 24 Hrs  T(C): 37 (02 Mar 2024 13:16), Max: 37 (02 Mar 2024 13:16)  T(F): 98.6 (02 Mar 2024 13:16), Max: 98.6 (02 Mar 2024 13:16)  HR: 58 (02 Mar 2024 13:16) (58 - 68)  BP: 154/95 (02 Mar 2024 13:16) (154/95 - 160/86)  BP(mean): --  RR: 13 (02 Mar 2024 13:16) (13 - 20)  SpO2: 97% (02 Mar 2024 13:16) (95% - 97%)    Parameters below as of 02 Mar 2024 13:16  Patient On (Oxygen Delivery Method): room air      Daily Height in cm: 195.58 (02 Mar 2024 09:34)    Daily     General:  Appears stated age, no distress  HEENT:  NC/AT,  conjunctivae clear and pink  Chest:  Full & symmetric excursion, no increased effort  Cardiovascular:  Regular rhythm, S1, S2  Abdomen:  Soft, non tender, non distended  Extremities:  no cyanosis, clubbing or edema  Skin:  No rash/erythema/ecchymoses/petechiae/wounds/abscess/warm/dry  Neuro/Psych:  Alert, oriented  : No discharge from urethral meatus; jeffery blood in urine at bedside     Laboratory:                          10.8   4.27  )-----------( 296      ( 02 Mar 2024 10:30 )             32.9     03-02    142  |  113<H>  |  21  ----------------------------<  103<H>  3.9   |  23  |  0.71    Ca    9.0      02 Mar 2024 10:30    TPro  7.0  /  Alb  3.3  /  TBili  0.5  /  DBili  x   /  AST  14<L>  /  ALT  19  /  AlkPhos  73  03-02    LIVER FUNCTIONS - ( 02 Mar 2024 10:30 )  Alb: 3.3 g/dL / Pro: 7.0 gm/dL / ALK PHOS: 73 U/L / ALT: 19 U/L / AST: 14 U/L / GGT: x             Urinalysis Basic - ( 02 Mar 2024 10:30 )    Color: Red / Appearance: Turbid / S.024 / pH: x  Gluc: 103 mg/dL / Ketone: Negative mg/dL  / Bili: Moderate / Urobili: 0.2 mg/dL   Blood: x / Protein: 30 mg/dL / Nitrite: Positive   Leuk Esterase: Large / RBC: Too Numerous to count /HPF / WBC 11 /HPF   Sq Epi: x / Non Sq Epi: x / Bacteria: Many /HPF        Imaging:    < from: CT Renal Stone Hunt (24 @ 12:45) >    ACC: [id number] EXAM:  CT RENAL STONE RAMÍREZ   ORDERED BY: SANTOSH RUIZ     PROCEDURE DATE:  2024          INTERPRETATION:  CT ABDOMEN AND PELVIS WITHOUT CONTRAST    Clinical Indication: Hematuria, history of kidney stones    Technique: Axial CT images of the abdomen and pelvis were obtained from   the lung bases to the pubic symphysis without oral or IV contrast.    Coronal and sagittal reformatted images were created and reviewed.    Comparison: Prior study from 2024, 2024, 2023, 2023,   5/10/2023 and 2020.    Findings:    LOWER CHEST: Within normal limits aside for bibasilar subsegmental   atelectatic changes.    LIVER: Unenhanced liver is stably prominent, up to 20 cm in length.  BILE DUCTS: Normal caliber.  GALLBLADDER: Status post cholecystectomy.  SPLEEN: Unenhanced spleen appears within normal limits.  PANCREAS: Unenhanced pancreas appears within normal limits.  ADRENALS: Unenhanced adrenals appear within normal limits.  KIDNEYS/URETERS:    Right kidney/ureter: There is a 4 to 5 mm calculus at the distal right   ureter/ureterovesicular junction, causing mild right   hydroureteronephrosis. This right ureteral stone is without significant   change in location as compared to the prior CTfrom 2024. There   appears to be mild interval decrease in right hydroureteronephrosis.   Single, additional 1 mm nonobstructing right intrarenal calculus is noted.    Left kidney/ureter: No calculi are seen within the left kidney or left   ureter. No left hydroureteronephrosis. There is a 3.2 cm left renal cyst,   stable.    BLADDER: Limited due to underdistention and due to streak artifact   emanating from right hip arthroplasty; within this limitation the bladder   appears walled with perivesicular stranding. No obvious calculi are seen   within the urinary bladder.    REPRODUCTIVE ORGANS: Limited evaluation of the prostate due to streak   artifact emanating from right hip arthroplasty.    BOWEL: Short segment luminal narrowing or stricture in the high rectum   for which neoplasm is not excluded (image 131, series 2). No evidence for   bowel obstruction or inflammation. Normal appendix.  PERITONEUM: No ascites.  VESSELS: Mild aortoiliac atherosclerotic disease is seen without aneurysm.  LYMPH NODES: No lymphadenopathy.  ABDOMINAL WALL: Within normal limits.  BONES: No suspicious osseous lesion. Right hip arthroplasty causes streak   artifact, limiting evaluation of adjacent structures. Multilevel   degenerative changes are seenthroughout the visualized spine, most   prominent at L5-S1 where there is minimal retrolisthesis of L5 on S1,   stable.  OTHER:  None    IMPRESSION:  There is a 4 to 5 mm calculus at the distal right ureter/ureterovesicular   junction, causing mild right hydroureteronephrosis. This right ureteral   stone is without significant change in location as compared to the prior   CT from 2024. There appears to be mild interval decrease in right   hydroureteronephrosis.    Limited evaluation of the urinary bladder due to underdistention and   adjacent streak artifact. Within this limitation, the bladder appears   thick-walled with perivesicular stranding. Correlate with urinalysis to   exclude underlying cystitis.    Short segment luminal narrowing or stricture in the high rectum for which   neoplasm is not excluded. Suggest colonoscopic correlation.    No evidence for bowel obstruction or inflammation. Normal appendix.    Other findings, as above.    --- End of Report ---            SIOMARA TIAN MD; Attending Radiologist  This document has been electronically signed. Mar  2 2024  1:07PM    < end of copied text >

## 2024-03-02 NOTE — ED ADULT TRIAGE NOTE - BMI (KG/M2)
07/09/18 1546   Child Life   Location [facility]fusion [facility]  (Present for ACTH stim test.)   Intervention Referral/Consult;Procedure Support;Supportive Check In  (ref: support for PIV.)   Preparation Comment Pt. familiar with PIV/Journey/CFL due to extensive medical history including BMT.  CFL provided support for PIV placement, as requested by family.   Procedure Support Comment Pt. very familiar with PIVs.  Pt. easily engaged in distraction during procedure.  Pt. appeared anxious at one point during procedure, as she began to cry, but was soothed by mother's voice/taking deep breaths and re-engaged with distractions on the ipad.  Overall, pt. able to hold still.     Family Support Comment Mother and  present; mother held pt.'s leg during PIV placement.   Growth and Development Comment Appears age-appropriate.   Anxiety Appropriate   Reaction to Separation from Parents none   Fears/Concerns needles;medical procedures   Techniques Used to [place]/Comfort/Calm diversional activity;family presence   Methods to Gain Cooperation distractions;provide choices   Able to Shift Focus From Anxiety Easy   Special Interests ware game on her personal device   Outcomes/Follow Up Continue to Follow/Support     
25.7

## 2024-03-02 NOTE — ED PROVIDER NOTE - NSFOLLOWUPINSTRUCTIONS_ED_ALL_ED_FT
- Follow-up with Urology Dr. Castillo within the next 3 days. Please call his office to schedule an appointment.   - Follow-up with your Primary Care Physician within the next week.    Medications  - Antibiotic: Cefuroxime 500 mg, 1 tablet, 2 times a day, for 7 days.  - Flomax 0.4 mg, 1 capsule, once a day (at bedtime).   - Take Tylenol (Acetaminophen) 650 mg every 4-6 hours AND/OR Motrin (Ibuprofen) 600 mg every 6-8 hours as needed for pain/fever.    Advance activity as tolerated.  Continue all previously prescribed medications as directed unless otherwise instructed.  Follow up with your primary care physician in 48-72 hours- bring copies of your results.  Return to the ER for worsening or persistent symptoms, and/or ANY NEW OR CONCERNING SYMPTOMS such as fever, chest pain, shortness of breath, abdominal pain, difficulty or inability with urination, or headaches. If you have issues obtaining follow up, please call: [phone] to obtain a doctor or specialist who takes your insurance in your area.  You may call [phone] to make an appointment with the internal medicine clinic.    Hematuria, Adult       Hematuria is blood in the urine. Blood may be visible in the urine, or it may be identified with a test. This condition can be caused by infections of the bladder, urethra, kidney, or prostate. Other possible causes include:    Kidney stones.  Cancer of the urinary tract.  Too much calcium in the urine.  Conditions that are passed from parent to child (inherited conditions).   Exercise that requires a lot of energy.    Infections can usually be treated with medicine, and a kidney stone usually will pass through your urine. If neither of these is the cause of your hematuria, more tests may be needed to identify the cause of your symptoms.    It is very important to tell your health care provider about any blood in your urine, even if it is painless or the blood stops without treatment. Blood in the urine, when it happens and then stops and then happens again, can be a symptom of a very serious condition, including cancer. There is no pain in the initial stages of many urinary cancers.    Follow these instructions at home:      Medicines    Take over-the-counter and prescription medicines only as told by your health care provider.  If you were prescribed an antibiotic medicine, take it as told by your health care provider. Do not stop taking the antibiotic even if you start to feel better.        Eating and drinking    Drink enough fluid to keep your urine clear or pale yellow. It is recommended that you drink 3–4 quarts (2.8–3.8 L) a day. If you have been diagnosed with an infection, it is recommended that you drink cranberry juice in addition to large amounts of water.  Avoid caffeine, tea, and carbonated beverages. These tend to irritate the bladder.  Avoid alcohol because it may irritate the prostate (men).        General instructions    If you have been diagnosed with a kidney stone, follow your health care provider's instructions about straining your urine to catch the stone.  Empty your bladder often. Avoid holding urine for long periods of time.  If you are female:    After a bowel movement, wipe from front to back and use each piece of toilet paper only once.  Empty your bladder before and after sex.  Pay attention to any changes in your symptoms. Tell your health care provider about any changes or any new symptoms.  It is your responsibility to get your test results. Ask your health care provider, or the department performing the test, when your results will be ready.  Keep all follow-up visits as told by your health care provider. This is important.    Contact a health care provider if:  You develop back pain.  You have a fever.  You have nausea or vomiting.  Your symptoms do not improve after 3 days.  Your symptoms get worse.    Get help right away if:  You develop severe vomiting and are unable take medicine without vomiting.  You develop severe pain in your back or abdomen even though you are taking medicine.  You pass a large amount of blood in your urine.  You pass blood clots in your urine.  You feel very weak or like you might faint.  You faint.    Summary  Hematuria is blood in the urine. It has many possible causes.  It is very important that you tell your health care provider about any blood in your urine, even if it is painless or the blood stops without treatment.  Take over-the-counter and prescription medicines only as told by your health care provider.  Drink enough fluid to keep your urine clear or pale yellow.    ADDITIONAL NOTES AND INSTRUCTIONS    Please follow up with your Primary MD in 24-48 hr.  Seek immediate medical care for any new/worsening signs or symptoms.

## 2024-03-02 NOTE — ED PROVIDER NOTE - CLINICAL SUMMARY MEDICAL DECISION MAKING FREE TEXT BOX
68M w/ PMH HTN, HLD, Prostate Cancer, Nephrolithiasis (w/ history of uteroscopy, lithotripsy, stents), ETOH Abuse who presents to ED w/ hematuria x today associated w/ dysuria. Pt w/ extensive history of Nephrolithiasis and UTI's in the past, pt states he had recent Kidney US performed outpatient w/ Urology Dr. Malone which were normal at that time w/ no kidney stones visualized, pt follows another Urologist for workup/management of Prostate Cancer. Denies fever, chills, chest pain, shortness of breath, abdominal pain, N/V/D, testicular pain, urinary frequency/urgency, extremity weakness/numbness/tingling, lightheadedness, dizziness, or headaches.    Patient currently afebrile, hemodynamically stable, spO2 95%. On PE - pt well-appearing, in no acute distress, heart w/ RRR, chest symmetrical, non-labored breathing, lungs clear bilaterally, abdomen soft/non-distended/non-tender to palpation, no CVA tenderness bilaterally. Based on history and physical, differentials include but are not limited to UTI, BPH, Prostate Cancer, Kidney Stones. Plan to assess patient for acute pathology as listed above with Labs, UA, CT Renal Howard. Will administer medications for symptomatic relief, follow-up on results, and reassess. Disposition pending workup/re-evaluation.

## 2024-03-02 NOTE — ED ADULT NURSE NOTE - NSFALLUNIVINTERV_ED_ALL_ED
Bed/Stretcher in lowest position, wheels locked, appropriate side rails in place/Call bell, personal items and telephone in reach/Instruct patient to call for assistance before getting out of bed/chair/stretcher/Non-slip footwear applied when patient is off stretcher/[place] to call system/Physically safe environment - no spills, clutter or unnecessary equipment/Purposeful proactive rounding/Room/bathroom lighting operational, light cord in reach

## 2024-03-02 NOTE — CONSULT NOTE ADULT - ASSESSMENT
8M w/ PMH HTN, HLD, Prostate Cancer, Nephrolithiasis (w/ history of uteroscopy, lithotripsy, stents), ETOH Abuse who presents to ED w/ hematuria x today associated w/ dysuria.   Repeat CT scan reveals 5mm stone unmoved with mild right hydronephrosis Creatinine WNL. Afebrile with no leukocytosis. H/H stable.     PLAN:     - Outpatient F/U with Dr. Castillo   - In outpatient office, OR planning for removal of renal stone   - Discussed with Dr. Castillo and Dr. Malone

## 2024-03-02 NOTE — ED ADULT NURSE REASSESSMENT NOTE - NS ED NURSE REASSESS COMMENT FT1
Pt resting on stretcher. Safety maintained. Updates given. Monitoring continued. IV site & patency inspected and integrity maintained. Witnessed JOÃO Nixon talk to pt regarding outpatient urology f/u
pt tbdc, ambualtory, stable. asking for medicaid cab, SW contacted.
Pt resting on stretcher. Safety maintained. Updates given. Monitoring continued. IV site & patency inspected and integrity maintained. Pending urology consult

## 2024-03-02 NOTE — ED PROVIDER NOTE - PATIENT PORTAL LINK FT
You can access the FollowMyHealth Patient Portal offered by [facility] by registering at the following website: [email or web address]. By joining [employer]’s FollowMyHealth portal, you will also be able to view your health information using other applications (apps) compatible with our system.

## 2024-03-07 ENCOUNTER — APPOINTMENT (OUTPATIENT)
Dept: UROLOGY | Facility: CLINIC | Age: 69
End: 2024-03-07

## 2024-05-06 ENCOUNTER — EMERGENCY (EMERGENCY)
Facility: HOSPITAL | Age: 69
LOS: 0 days | Discharge: HOME | End: 2024-05-06
Attending: EMERGENCY MEDICINE
Payer: MEDICARE

## 2024-05-06 VITALS
OXYGEN SATURATION: 98 % | SYSTOLIC BLOOD PRESSURE: 143 MMHG | TEMPERATURE: 98 F | HEART RATE: 64 BPM | DIASTOLIC BLOOD PRESSURE: 83 MMHG | RESPIRATION RATE: 15 BRPM

## 2024-05-06 VITALS
OXYGEN SATURATION: 96 % | RESPIRATION RATE: 20 BRPM | TEMPERATURE: 98 F | DIASTOLIC BLOOD PRESSURE: 90 MMHG | HEIGHT: 77 IN | SYSTOLIC BLOOD PRESSURE: 154 MMHG | HEART RATE: 80 BPM | WEIGHT: 216.93 LBS

## 2024-05-06 DIAGNOSIS — Z88.5 ALLERGY STATUS TO NARCOTIC AGENT: ICD-10-CM

## 2024-05-06 DIAGNOSIS — N20.1 CALCULUS OF URETER: ICD-10-CM

## 2024-05-06 DIAGNOSIS — I10 ESSENTIAL (PRIMARY) HYPERTENSION: ICD-10-CM

## 2024-05-06 DIAGNOSIS — Z85.46 PERSONAL HISTORY OF MALIGNANT NEOPLASM OF PROSTATE: ICD-10-CM

## 2024-05-06 DIAGNOSIS — R10.9 UNSPECIFIED ABDOMINAL PAIN: ICD-10-CM

## 2024-05-06 DIAGNOSIS — Z96.641 PRESENCE OF RIGHT ARTIFICIAL HIP JOINT: Chronic | ICD-10-CM

## 2024-05-06 LAB
ALBUMIN SERPL ELPH-MCNC: 3.2 G/DL — LOW (ref 3.3–5)
ALP SERPL-CCNC: 78 U/L — SIGNIFICANT CHANGE UP (ref 40–120)
ALT FLD-CCNC: 28 U/L — SIGNIFICANT CHANGE UP (ref 12–78)
ANION GAP SERPL CALC-SCNC: 11 MMOL/L — SIGNIFICANT CHANGE UP (ref 5–17)
APPEARANCE UR: ABNORMAL
AST SERPL-CCNC: 26 U/L — SIGNIFICANT CHANGE UP (ref 15–37)
BACTERIA # UR AUTO: ABNORMAL /HPF
BASOPHILS # BLD AUTO: 0.04 K/UL — SIGNIFICANT CHANGE UP (ref 0–0.2)
BASOPHILS NFR BLD AUTO: 0.5 % — SIGNIFICANT CHANGE UP (ref 0–2)
BILIRUB SERPL-MCNC: 0.3 MG/DL — SIGNIFICANT CHANGE UP (ref 0.2–1.2)
BILIRUB UR-MCNC: NEGATIVE — SIGNIFICANT CHANGE UP
BUN SERPL-MCNC: 26 MG/DL — HIGH (ref 7–23)
CALCIUM SERPL-MCNC: 9.2 MG/DL — SIGNIFICANT CHANGE UP (ref 8.5–10.1)
CHLORIDE SERPL-SCNC: 108 MMOL/L — SIGNIFICANT CHANGE UP (ref 96–108)
CO2 SERPL-SCNC: 21 MMOL/L — LOW (ref 22–31)
COD CRY URNS QL: PRESENT
COLOR SPEC: YELLOW — SIGNIFICANT CHANGE UP
CREAT SERPL-MCNC: 1.27 MG/DL — SIGNIFICANT CHANGE UP (ref 0.5–1.3)
DIFF PNL FLD: ABNORMAL
EGFR: 62 ML/MIN/1.73M2 — SIGNIFICANT CHANGE UP
EOSINOPHIL # BLD AUTO: 0.15 K/UL — SIGNIFICANT CHANGE UP (ref 0–0.5)
EOSINOPHIL NFR BLD AUTO: 2 % — SIGNIFICANT CHANGE UP (ref 0–6)
EPI CELLS # UR: PRESENT
GLUCOSE SERPL-MCNC: 136 MG/DL — HIGH (ref 70–99)
GLUCOSE UR QL: NEGATIVE MG/DL — SIGNIFICANT CHANGE UP
HCT VFR BLD CALC: 36.9 % — LOW (ref 39–50)
HGB BLD-MCNC: 13 G/DL — SIGNIFICANT CHANGE UP (ref 13–17)
IMM GRANULOCYTES NFR BLD AUTO: 0.4 % — SIGNIFICANT CHANGE UP (ref 0–0.9)
KETONES UR-MCNC: NEGATIVE MG/DL — SIGNIFICANT CHANGE UP
LACTATE SERPL-SCNC: 1.4 MMOL/L — SIGNIFICANT CHANGE UP (ref 0.7–2)
LEUKOCYTE ESTERASE UR-ACNC: ABNORMAL
LIDOCAIN IGE QN: 31 U/L — SIGNIFICANT CHANGE UP (ref 13–75)
LYMPHOCYTES # BLD AUTO: 1.13 K/UL — SIGNIFICANT CHANGE UP (ref 1–3.3)
LYMPHOCYTES # BLD AUTO: 15.1 % — SIGNIFICANT CHANGE UP (ref 13–44)
MCHC RBC-ENTMCNC: 29.7 PG — SIGNIFICANT CHANGE UP (ref 27–34)
MCHC RBC-ENTMCNC: 35.2 G/DL — SIGNIFICANT CHANGE UP (ref 32–36)
MCV RBC AUTO: 84.2 FL — SIGNIFICANT CHANGE UP (ref 80–100)
MONOCYTES # BLD AUTO: 0.72 K/UL — SIGNIFICANT CHANGE UP (ref 0–0.9)
MONOCYTES NFR BLD AUTO: 9.6 % — SIGNIFICANT CHANGE UP (ref 2–14)
NEUTROPHILS # BLD AUTO: 5.42 K/UL — SIGNIFICANT CHANGE UP (ref 1.8–7.4)
NEUTROPHILS NFR BLD AUTO: 72.4 % — SIGNIFICANT CHANGE UP (ref 43–77)
NITRITE UR-MCNC: NEGATIVE — SIGNIFICANT CHANGE UP
NRBC # BLD: 0 /100 WBCS — SIGNIFICANT CHANGE UP (ref 0–0)
PH UR: 5 — SIGNIFICANT CHANGE UP (ref 5–8)
PLATELET # BLD AUTO: 234 K/UL — SIGNIFICANT CHANGE UP (ref 150–400)
POTASSIUM SERPL-MCNC: 4 MMOL/L — SIGNIFICANT CHANGE UP (ref 3.5–5.3)
POTASSIUM SERPL-SCNC: 4 MMOL/L — SIGNIFICANT CHANGE UP (ref 3.5–5.3)
PROT SERPL-MCNC: 7.4 GM/DL — SIGNIFICANT CHANGE UP (ref 6–8.3)
PROT UR-MCNC: 100 MG/DL
RBC # BLD: 4.38 M/UL — SIGNIFICANT CHANGE UP (ref 4.2–5.8)
RBC # FLD: 13.5 % — SIGNIFICANT CHANGE UP (ref 10.3–14.5)
RBC CASTS # UR COMP ASSIST: 6 /HPF — HIGH (ref 0–4)
SODIUM SERPL-SCNC: 140 MMOL/L — SIGNIFICANT CHANGE UP (ref 135–145)
SP GR SPEC: >1.03 — HIGH (ref 1–1.03)
URATE CRY FLD QL MICRO: PRESENT
UROBILINOGEN FLD QL: 0.2 MG/DL — SIGNIFICANT CHANGE UP (ref 0.2–1)
WBC # BLD: 7.49 K/UL — SIGNIFICANT CHANGE UP (ref 3.8–10.5)
WBC # FLD AUTO: 7.49 K/UL — SIGNIFICANT CHANGE UP (ref 3.8–10.5)
WBC UR QL: 4 /HPF — SIGNIFICANT CHANGE UP (ref 0–5)

## 2024-05-06 PROCEDURE — 99284 EMERGENCY DEPT VISIT MOD MDM: CPT

## 2024-05-06 PROCEDURE — 74176 CT ABD & PELVIS W/O CONTRAST: CPT | Mod: 26,MC

## 2024-05-06 RX ORDER — KETOROLAC TROMETHAMINE 30 MG/ML
15 SYRINGE (ML) INJECTION ONCE
Refills: 0 | Status: DISCONTINUED | OUTPATIENT
Start: 2024-05-06 | End: 2024-05-06

## 2024-05-06 RX ORDER — ONDANSETRON 8 MG/1
1 TABLET, FILM COATED ORAL
Qty: 6 | Refills: 0
Start: 2024-05-06 | End: 2024-05-08

## 2024-05-06 RX ORDER — ACETAMINOPHEN 500 MG
1000 TABLET ORAL ONCE
Refills: 0 | Status: COMPLETED | OUTPATIENT
Start: 2024-05-06 | End: 2024-05-06

## 2024-05-06 RX ORDER — ONDANSETRON 8 MG/1
4 TABLET, FILM COATED ORAL ONCE
Refills: 0 | Status: COMPLETED | OUTPATIENT
Start: 2024-05-06 | End: 2024-05-06

## 2024-05-06 RX ORDER — TAMSULOSIN HYDROCHLORIDE 0.4 MG/1
1 CAPSULE ORAL
Qty: 10 | Refills: 0
Start: 2024-05-06 | End: 2024-05-15

## 2024-05-06 RX ORDER — TAMSULOSIN HYDROCHLORIDE 0.4 MG/1
0.4 CAPSULE ORAL ONCE
Refills: 0 | Status: COMPLETED | OUTPATIENT
Start: 2024-05-06 | End: 2024-05-06

## 2024-05-06 RX ORDER — OXYCODONE AND ACETAMINOPHEN 5; 325 MG/1; MG/1
1 TABLET ORAL
Qty: 12 | Refills: 0
Start: 2024-05-06 | End: 2024-05-08

## 2024-05-06 RX ORDER — SODIUM CHLORIDE 9 MG/ML
1000 INJECTION INTRAMUSCULAR; INTRAVENOUS; SUBCUTANEOUS ONCE
Refills: 0 | Status: COMPLETED | OUTPATIENT
Start: 2024-05-06 | End: 2024-05-06

## 2024-05-06 RX ADMIN — ONDANSETRON 4 MILLIGRAM(S): 8 TABLET, FILM COATED ORAL at 05:19

## 2024-05-06 RX ADMIN — SODIUM CHLORIDE 1000 MILLILITER(S): 9 INJECTION INTRAMUSCULAR; INTRAVENOUS; SUBCUTANEOUS at 05:20

## 2024-05-06 RX ADMIN — TAMSULOSIN HYDROCHLORIDE 0.4 MILLIGRAM(S): 0.4 CAPSULE ORAL at 06:55

## 2024-05-06 RX ADMIN — Medication 400 MILLIGRAM(S): at 05:19

## 2024-05-06 RX ADMIN — Medication 15 MILLIGRAM(S): at 05:19

## 2024-05-06 NOTE — ED PROVIDER NOTE - CLINICAL SUMMARY MEDICAL DECISION MAKING FREE TEXT BOX
Pt with ureterolithiasis - will dc with Urology followup - given zofran/percocet and tamsulosin script and pain now under control in ED. No active vomiting in ED noted.

## 2024-05-06 NOTE — ED PROVIDER NOTE - NSFOLLOWUPINSTRUCTIONS_ED_ALL_ED_FT
Ureteral Stones    WHAT YOU NEED TO KNOW:    What is a ureteral stone? A ureteral stone forms in the kidney and moves down the ureter and gets stuck there. The ureter is the tube that takes urine from the kidney to the bladder. Stones can form in the urinary system when your urine has high levels of minerals and salts. Urinary stones can be made of uric acid, calcium, phosphate, or oxalate crystals.  Kidney, Ureters, Bladder    What increases my risk for urinary stones?    Not drinking enough liquids (especially water) each day    Having urinary tract infections often    Too much of certain foods, such as meat, salt, nuts, and chocolate    Obesity    Certain medicines, such as diuretics, steroids, and antacids    A family history of kidney stones    Being born with a kidney or bowel disorder  What are the signs and symptoms of ureteral stones?    Severe pain on your lower abdomen or groin    Nausea and vomiting    Urge to urinate often, or not being able to urinate easily    Burning feeling when you urinate, or pink or red urine  How are ureteral stones diagnosed? Your healthcare provider will examine you and ask about your symptoms. Tell your provider about your activity level and your usual foods. Also tell your provider about any health conditions you have. You may also need any of the following:    Urine tests may show if you have blood in your urine. They may also show high amounts of the substances that form stones, such as uric acid.    Blood tests show how well your kidneys are working. They may also be used to check the levels of calcium or uric acid in your blood.    An x-ray or CT scan of your kidneys, ureters, and bladder may be done. You may be given contrast liquid to help these show up better in the pictures. You may need to have more than one x-ray. Tell the healthcare provider if you have ever had an allergic reaction to contrast liquid.  How are ureteral stones treated?    NSAIDs, such as ibuprofen, help decrease swelling, pain, and fever. This medicine is available with or without a doctor's order. NSAIDs can cause stomach bleeding or kidney problems in certain people. If you take blood thinner medicine, always ask your healthcare provider if NSAIDs are safe for you. Always read the medicine label and follow directions.    Prescription pain medicine may be given. Ask your healthcare provider how to take this medicine safely. Some prescription pain medicines contain acetaminophen. Do not take other medicines that contain acetaminophen without talking to your healthcare provider. Too much acetaminophen may cause liver damage. Prescription pain medicine may cause constipation. Ask your healthcare provider how to prevent or treat constipation.    Nausea medicine may help calm your stomach and prevent vomiting.    A procedure or surgery to remove the ureteral stone may be needed if it does not pass on its own.  What can I do to manage uretal stones?    Drink more liquids. Your healthcare provider may tell you to drink at least 8 to 12 (eight-ounce) cups of liquid each day. This helps flush out the stones when you urinate. Water is the best liquid to drink. Your urine will be clear (not yellow) if you are drinking enough liquid.    Strain your urine every time you go to the bathroom. Urinate through a strainer or a piece of thin cloth to catch the stone. Take the stone to your healthcare provider so it can be sent to a lab for tests. This will help your healthcare providers plan the best treatment for you.  Look for Stones in the Filter      Ask your healthcare provider about any nutrition changes you need to make. You may need to limit certain foods, such as foods high in sodium (salt) or protein. You may need to limit leafy green vegetables, carbonated drinks, or beer. These changes will depend on the kind of stones you have.  High Oxalate Foods      Stay active. Your stones may pass more easily if you stay active. Exercise can also help you manage your weight. Ask about the best activities for you.  Ways to Be Physically Active  When should I seek immediate care?    You have severe pain that does not improve, even after you take medicine.    You have vomiting that is not relieved by medicine.  When should I call my doctor?    You develop a fever.    You have questions or concerns about your condition or care.  CARE AGREEMENT:    You have the right to help plan your care. Learn about your health condition and how it may be treated. Discuss treatment options with your healthcare providers to decide what care you want to receive. You always have the right to refuse treatment.

## 2024-05-06 NOTE — ED PROVIDER NOTE - CARE PROVIDER_API CALL
Louis Malone.  Urology  [address] [place] [address]  Phone: [phone]  Fax: [phone]  Follow Up Time: 1-3 Days

## 2024-05-06 NOTE — ED PROVIDER NOTE - PATIENT PORTAL LINK FT
You can access the FollowMyHealth Patient Portal offered by [facility] by registering at the following website: [email or web address]. By joining [employer]’s FollowMyHealth portal, you will also be able to view your health information using other applications (apps) compatible with our system.

## 2024-05-06 NOTE — ED ADULT TRIAGE NOTE - CHIEF COMPLAINT QUOTE
68 year old male came in c/o lower abdominal pain starting 22:00pm 10/10 radiating to the back, non febrile, A&Ox4, PMH HTN, HLD, and kidney stones. denies dysuria, hematuria, allergic to morphine

## 2024-05-06 NOTE — ED PROVIDER NOTE - OBJECTIVE STATEMENT
68-year-old male with history of hypertension, prostate CA, recurrence of ureteral stones presented to ER due to flank pain x 1 day otherwise states no noted fever or chills no notable hematuria otherwise.

## 2024-05-06 NOTE — ED ADULT NURSE NOTE - OBJECTIVE STATEMENT
Pt A&OX4. Pt c/o right groin and abd pain radiating to the right side of back starting 22:00pm. PMH HTN, HLD, and kidney stones. Pt states " i've been in and out of this hospital for this same issue, they broke up all the other ones but there's still one left'. Pt denies cp, difficulty breathing, SOB, dysuria, hematuria, diarrhea. Pt currently nauseous and had 2 episodes of vomiting upon arrival. pt allergic to morphine.

## 2024-05-07 LAB
CULTURE RESULTS: ABNORMAL
SPECIMEN SOURCE: SIGNIFICANT CHANGE UP

## 2024-05-21 ENCOUNTER — APPOINTMENT (OUTPATIENT)
Dept: UROLOGY | Facility: CLINIC | Age: 69
End: 2024-05-21

## 2024-06-11 ENCOUNTER — APPOINTMENT (OUTPATIENT)
Dept: CARDIOLOGY | Facility: CLINIC | Age: 69
End: 2024-06-11

## 2024-06-25 ENCOUNTER — APPOINTMENT (OUTPATIENT)
Dept: CARDIOLOGY | Facility: CLINIC | Age: 69
End: 2024-06-25

## 2024-06-25 PROCEDURE — G2211 COMPLEX E/M VISIT ADD ON: CPT

## 2024-06-25 PROCEDURE — 93000 ELECTROCARDIOGRAM COMPLETE: CPT

## 2024-06-25 PROCEDURE — 99215 OFFICE O/P EST HI 40 MIN: CPT

## 2024-08-13 NOTE — ED ADULT NURSE NOTE - NSSUHOSCREENINGYN_ED_ALL_ED
[de-identified] : The patient was advised of the diagnosis.  The natural history of the pathology was explained in full to the patient in layman's terms. All questions were answered.  The risks and benefits of surgical and non-surgical treatment alternatives were explained in full to the patient.  The natural progression of Osteoarthritis was explained to the patient. We discussed the possible treatment options from conservative to operative. These included NSAIDS, Glucosamine and Chondroitin sulfate, and Physical Therapy as well different types of injections. We also discussed that at some point they may progress to needing a TKA.  Information and pamphlets were given when appropriate.  The patient's orthopaedic condition(s) warrants intermittent use of a prescription strength non-steroidal anti-inflammatory medication.  These medications are associated with risks including but not limited to gastrointestinal irritation, kidney damage, hypertension, and bleeding.  The patient understands and will take medications as prescribed.  The patient will stop the medication and consult a physician as needed if problems arise.  The risks, benefits, contents and alternatives to injection were explained in full to the patient.  Risks outlined include but are not limited to infection, sepsis, bleeding, scarring, skin discoloration, temporary increase in pain, syncopal episode, failure to resolve symptoms, allergic reaction, flare reaction, permanent white skin discoloration, symptom recurrence, and elevation of blood sugar in diabetics.  Patient understood the risks.  All questions were answered.  After discussion of options, patient requested an injection.  Oral informed consent was obtained and sterile prep was done of the injection site.  Sterile technique was used to introduce the mixture.  Patient tolerated the procedure well.  Patient advised to ice the injection site this evening.  Signs and symptoms of infection reviewed and patient advised to call immediately for redness, fevers, and/or chills.   Progress Note completed by TRI Duarte * Dr. Valentine -- The documentation recorded in this note accurately reflects the decisions made by me during this visit.  I interviewed and examined the patient personally and oversaw all medical decisions.
Yes - the patient is able to be screened

## 2024-10-15 ENCOUNTER — APPOINTMENT (OUTPATIENT)
Dept: CARDIOLOGY | Facility: CLINIC | Age: 69
End: 2024-10-15
Payer: MEDICARE

## 2024-10-15 DIAGNOSIS — R00.2 PALPITATIONS: ICD-10-CM

## 2024-10-15 DIAGNOSIS — I10 ESSENTIAL (PRIMARY) HYPERTENSION: ICD-10-CM

## 2024-10-15 DIAGNOSIS — E78.5 HYPERLIPIDEMIA, UNSPECIFIED: ICD-10-CM

## 2024-10-15 PROCEDURE — G2211 COMPLEX E/M VISIT ADD ON: CPT

## 2024-10-15 PROCEDURE — 99214 OFFICE O/P EST MOD 30 MIN: CPT

## 2024-10-15 PROCEDURE — 93000 ELECTROCARDIOGRAM COMPLETE: CPT

## 2024-10-24 ENCOUNTER — APPOINTMENT (OUTPATIENT)
Dept: ELECTROPHYSIOLOGY | Facility: CLINIC | Age: 69
End: 2024-10-24

## 2024-11-14 PROCEDURE — 93244 EXT ECG>48HR<7D REV&INTERPJ: CPT

## 2024-11-27 ENCOUNTER — APPOINTMENT (OUTPATIENT)
Dept: CARDIOLOGY | Facility: CLINIC | Age: 69
End: 2024-11-27

## 2025-03-18 ENCOUNTER — APPOINTMENT (OUTPATIENT)
Facility: CLINIC | Age: 70
End: 2025-03-18

## 2025-04-29 NOTE — PATIENT PROFILE ADULT - FUNCTIONAL ASSESSMENT - BASIC MOBILITY SECTION LABEL
[facility] [address] [address]     April 29, 2025    Patient: Gregorio Gregorio   YOB: 2015   Date of Visit: 4/29/2025       To Whom It May Concern:    Gregorio Gregorio was seen and treated in our department on 4/29/2025.     Please excuse Gregorio from school 4/30/25-5/2/25.   He may return if symptoms improve and he goes a 24-hour time period without a fever.               Sincerely,     PA Tang.                
.

## 2025-05-22 NOTE — ED ADULT NURSE NOTE - PAIN RATING/NUMBER SCALE (0-10): REST
Attempted outreach to assist in making a follow up appointment.   Left a voice mail with my contact information.   Will continue to follow.  Luisito ARMANDO, RN, [facility] Organization  O: [phone]        9

## 2025-06-24 ENCOUNTER — APPOINTMENT (OUTPATIENT)
Facility: CLINIC | Age: 70
End: 2025-06-24

## 2025-08-19 ENCOUNTER — APPOINTMENT (OUTPATIENT)
Facility: CLINIC | Age: 70
End: 2025-08-19

## 2025-09-02 ENCOUNTER — APPOINTMENT (OUTPATIENT)
Facility: CLINIC | Age: 70
End: 2025-09-02

## 2025-09-02 DIAGNOSIS — I10 ESSENTIAL (PRIMARY) HYPERTENSION: ICD-10-CM

## 2025-09-02 DIAGNOSIS — R07.9 CHEST PAIN, UNSPECIFIED: ICD-10-CM

## 2025-09-02 DIAGNOSIS — I77.810 THORACIC AORTIC ECTASIA: ICD-10-CM

## 2025-09-02 DIAGNOSIS — I25.10 ATHEROSCLEROTIC HEART DISEASE OF NATIVE CORONARY ARTERY W/OUT ANGINA PECTORIS: ICD-10-CM

## 2025-09-02 DIAGNOSIS — E78.5 HYPERLIPIDEMIA, UNSPECIFIED: ICD-10-CM

## 2025-09-02 PROCEDURE — G2211 COMPLEX E/M VISIT ADD ON: CPT

## 2025-09-02 PROCEDURE — 99215 OFFICE O/P EST HI 40 MIN: CPT

## 2025-09-02 PROCEDURE — 93000 ELECTROCARDIOGRAM COMPLETE: CPT

## 2025-09-03 VITALS — HEART RATE: 72 BPM | DIASTOLIC BLOOD PRESSURE: 96 MMHG | OXYGEN SATURATION: 96 % | SYSTOLIC BLOOD PRESSURE: 139 MMHG

## 2025-09-03 PROBLEM — R07.9 CHEST PAIN, CARDIAC: Status: ACTIVE | Noted: 2025-09-03

## (undated) DEVICE — IRR BULB PATHFINDER + 10"

## (undated) DEVICE — SYR LUER LOK 20CC

## (undated) DEVICE — PACK CYSTO

## (undated) DEVICE — SOL IRR BAG H2O 3000ML

## (undated) DEVICE — SOL IRR POUR NS 0.9% 1000ML

## (undated) DEVICE — DRAPE LINGEMAN TUR

## (undated) DEVICE — BASIN SET SINGLE

## (undated) DEVICE — SYR LUER LOK 10CC

## (undated) DEVICE — POSITIONER FOAM HEAD DONUT 9" (PINK)

## (undated) DEVICE — SOL IRR BAG NS 0.9% 3000ML

## (undated) DEVICE — MEDICATION LABELS W MARKER

## (undated) DEVICE — GLV 7 PROTEXIS (WHITE)

## (undated) DEVICE — VENODYNE/SCD SLEEVE CALF MEDIUM

## (undated) DEVICE — DRAPE EQUIPMENT BANDED BAG 30 X 30" (SHOWER CAP)

## (undated) DEVICE — DRAPE C ARM 41X140"

## (undated) DEVICE — FOLEY TRAY 16FR 5CC LTX UMETER CLOSED

## (undated) DEVICE — TUBING LEVEL ONE NORMOFLO SET

## (undated) DEVICE — FOLEY CATH 2-WAY 16FR 5CC LATEX LUBRICATH

## (undated) DEVICE — WARMING BLANKET UPPER ADULT